# Patient Record
Sex: MALE | Race: WHITE | Employment: OTHER | URBAN - METROPOLITAN AREA
[De-identification: names, ages, dates, MRNs, and addresses within clinical notes are randomized per-mention and may not be internally consistent; named-entity substitution may affect disease eponyms.]

---

## 2018-08-02 ENCOUNTER — TELEPHONE (OUTPATIENT)
Dept: GASTROENTEROLOGY | Age: 73
End: 2018-08-02

## 2018-08-02 NOTE — TELEPHONE ENCOUNTER
Mailed patient a letter to contact the office to schedule colonoscopy. Patient was due in 2017. Patient was contacted at least 2 times prior to today.  Patient will be removed from recall list.

## 2019-04-19 LAB
A/G RATIO: 1.4 (ref 1.1–2.2)
ALBUMIN SERPL-MCNC: 4.6 G/DL (ref 3.4–5)
ALP BLD-CCNC: 98 U/L (ref 40–129)
ALT SERPL-CCNC: 23 U/L (ref 10–40)
ANION GAP SERPL CALCULATED.3IONS-SCNC: 12 MMOL/L (ref 3–16)
AST SERPL-CCNC: 19 U/L (ref 15–37)
BILIRUB SERPL-MCNC: 0.9 MG/DL (ref 0–1)
BUN BLDV-MCNC: 15 MG/DL (ref 7–20)
CALCIUM SERPL-MCNC: 9.7 MG/DL (ref 8.3–10.6)
CHLORIDE BLD-SCNC: 104 MMOL/L (ref 99–110)
CHOLESTEROL, FASTING: 144 MG/DL (ref 0–199)
CO2: 25 MMOL/L (ref 21–32)
CREAT SERPL-MCNC: 1.1 MG/DL (ref 0.8–1.3)
GFR AFRICAN AMERICAN: >60
GFR NON-AFRICAN AMERICAN: >60
GLOBULIN: 3.2 G/DL
GLUCOSE FASTING: 98 MG/DL (ref 70–99)
HDLC SERPL-MCNC: 42 MG/DL (ref 40–60)
LDL CHOLESTEROL CALCULATED: 74 MG/DL
POTASSIUM SERPL-SCNC: 4.6 MMOL/L (ref 3.5–5.1)
SODIUM BLD-SCNC: 141 MMOL/L (ref 136–145)
T4 FREE: 1.4 NG/DL (ref 0.9–1.8)
TOTAL PROTEIN: 7.8 G/DL (ref 6.4–8.2)
TRIGLYCERIDE, FASTING: 140 MG/DL (ref 0–150)
TSH SERPL DL<=0.05 MIU/L-ACNC: 5.09 UIU/ML (ref 0.27–4.2)
VLDLC SERPL CALC-MCNC: 28 MG/DL

## 2019-04-29 ENCOUNTER — HOSPITAL ENCOUNTER (OUTPATIENT)
Dept: GENERAL RADIOLOGY | Age: 74
Discharge: HOME | End: 2019-04-29
Payer: MEDICARE

## 2019-04-29 ENCOUNTER — HOSPITAL ENCOUNTER (OUTPATIENT)
Age: 74
Discharge: HOME | End: 2019-04-29
Payer: MEDICARE

## 2019-04-29 DIAGNOSIS — R05.9 COUGH: ICD-10-CM

## 2019-04-29 PROCEDURE — 71046 X-RAY EXAM CHEST 2 VIEWS: CPT

## 2019-04-30 ENCOUNTER — TELEPHONE (OUTPATIENT)
Dept: FAMILY MEDICINE CLINIC | Age: 74
End: 2019-04-30

## 2019-04-30 NOTE — TELEPHONE ENCOUNTER
LM FOR PT ON CHEST XRAY RESULTS, XRAY NORMAL PER DR BRAR.     Electronically signed by Kathya Hurt MA on 4/30/2019 at 9:35 AM

## 2019-05-02 ENCOUNTER — TELEPHONE (OUTPATIENT)
Dept: FAMILY MEDICINE CLINIC | Age: 74
End: 2019-05-02

## 2019-05-02 RX ORDER — LOSARTAN POTASSIUM 50 MG/1
50 TABLET ORAL DAILY
Qty: 30 TABLET | Refills: 2 | Status: SHIPPED | OUTPATIENT
Start: 2019-05-02 | End: 2019-06-04 | Stop reason: SINTOL

## 2019-05-02 NOTE — TELEPHONE ENCOUNTER
MICHAEL AVELAR. PT, ADVISED PER DR BRAR GO BACK ON LOSARTAN, D/C HCTZ. PT VOICED UNDERSTANDING, SENT WM/ISAIAS.     Electronically signed by Berhane Richards MA on 5/2/2019 at 3:03 PM

## 2019-05-31 ENCOUNTER — TELEPHONE (OUTPATIENT)
Dept: FAMILY MEDICINE CLINIC | Age: 74
End: 2019-05-31

## 2019-05-31 NOTE — TELEPHONE ENCOUNTER
SPOKE WITH WIFE REG PREV MESSAGE. EXPLAINED D/T NEW COMPANY AND SCHEDULING July IS THE NEXT AVAILABLE APPT. HOWEVER WE COULD PUT PT HIM ON THE CANCELLATION LIST FOR APPTS, WALK IN CLINIC AND I COULD TALK WITH DR Linda Bazzi ON Monday TO ADDRESS. ADVISED DR GIBBS IS OUT OF THE OFFICE TODAY. GÓMEZ VOICED SINCE STARING THE NEW BP MEDICATION 18-20 DAYS PT'S FELT TERRIBLE AND BP AND PULSE WAS ALL OVER THE PLACE. PT IS ALSO HAS NO ENERGY. GÓMEZ VOICED UNDERSTANDING. MESSAGE FWD TO DR Linda Bazzi TO ADDRESS.     Electronically signed by Jose Carmichael MA on 5/31/2019 at 4:14 PM

## 2019-05-31 NOTE — TELEPHONE ENCOUNTER
----- Message from Fred Ibrahim sent at 5/31/2019  3:31 PM EDT -----  Contact: phil   pts wife called wanting to get an appt for him because when he was in on April 26, 2019 Dr. Nilda Barba changed his bp medicine and she said there is something really wrong and he needs seen. The first available is August 9th. I'm not able to do anything.

## 2019-06-03 DIAGNOSIS — E05.00 GRAVES' DISEASE IN REMISSION: ICD-10-CM

## 2019-06-03 DIAGNOSIS — Z86.010 HISTORY OF COLON POLYPS: ICD-10-CM

## 2019-06-03 DIAGNOSIS — I10 ESSENTIAL HYPERTENSION: ICD-10-CM

## 2019-06-03 DIAGNOSIS — E03.8 SECONDARY HYPOTHYROIDISM: ICD-10-CM

## 2019-06-03 RX ORDER — SIMVASTATIN 20 MG
20 TABLET ORAL DAILY
COMMUNITY
End: 2019-10-28 | Stop reason: SDUPTHER

## 2019-06-03 RX ORDER — LEVOTHYROXINE SODIUM 0.15 MG/1
150 TABLET ORAL DAILY
COMMUNITY
End: 2019-10-28 | Stop reason: SDUPTHER

## 2019-06-03 RX ORDER — LEVOTHYROXINE SODIUM 137 UG/1
137 TABLET ORAL DAILY
COMMUNITY
End: 2019-10-28 | Stop reason: SDUPTHER

## 2019-06-03 RX ORDER — AMLODIPINE BESYLATE 10 MG/1
10 TABLET ORAL DAILY
COMMUNITY
End: 2019-10-28 | Stop reason: SDUPTHER

## 2019-06-03 NOTE — TELEPHONE ENCOUNTER
SPOKE WITH WIFE REG PREV MESSAGE. APPT MADE FOR TOMORROW 6/4/19 1:30PM WITH DR Sherron Esteves.     Electronically signed by Charito Gutierrez MA on 6/3/2019 at 10:46 AM

## 2019-06-04 ENCOUNTER — OFFICE VISIT (OUTPATIENT)
Dept: FAMILY MEDICINE CLINIC | Age: 74
End: 2019-06-04
Payer: MEDICARE

## 2019-06-04 VITALS
WEIGHT: 210 LBS | HEART RATE: 60 BPM | HEIGHT: 72 IN | SYSTOLIC BLOOD PRESSURE: 130 MMHG | DIASTOLIC BLOOD PRESSURE: 82 MMHG | BODY MASS INDEX: 28.44 KG/M2

## 2019-06-04 DIAGNOSIS — R53.83 FATIGUE, UNSPECIFIED TYPE: ICD-10-CM

## 2019-06-04 DIAGNOSIS — T46.4X5A COUGH DUE TO ACE INHIBITOR: ICD-10-CM

## 2019-06-04 DIAGNOSIS — R05.8 COUGH DUE TO ACE INHIBITOR: ICD-10-CM

## 2019-06-04 DIAGNOSIS — I10 ESSENTIAL HYPERTENSION: Primary | ICD-10-CM

## 2019-06-04 DIAGNOSIS — R50.2 DRUG-INDUCED FEVER: ICD-10-CM

## 2019-06-04 PROCEDURE — 4040F PNEUMOC VAC/ADMIN/RCVD: CPT | Performed by: FAMILY MEDICINE

## 2019-06-04 PROCEDURE — 1123F ACP DISCUSS/DSCN MKR DOCD: CPT | Performed by: FAMILY MEDICINE

## 2019-06-04 PROCEDURE — 99213 OFFICE O/P EST LOW 20 MIN: CPT | Performed by: FAMILY MEDICINE

## 2019-06-04 PROCEDURE — 3017F COLORECTAL CA SCREEN DOC REV: CPT | Performed by: FAMILY MEDICINE

## 2019-06-04 PROCEDURE — G8427 DOCREV CUR MEDS BY ELIG CLIN: HCPCS | Performed by: FAMILY MEDICINE

## 2019-06-04 PROCEDURE — 1036F TOBACCO NON-USER: CPT | Performed by: FAMILY MEDICINE

## 2019-06-04 PROCEDURE — G8419 CALC BMI OUT NRM PARAM NOF/U: HCPCS | Performed by: FAMILY MEDICINE

## 2019-06-04 RX ORDER — CALCIUM CARBONATE/VITAMIN D3 600 MG-10
1 TABLET ORAL DAILY
COMMUNITY
End: 2022-07-21

## 2019-06-04 RX ORDER — ATENOLOL 25 MG/1
25 TABLET ORAL DAILY
Qty: 30 TABLET | Refills: 3 | Status: SHIPPED | OUTPATIENT
Start: 2019-06-04 | End: 2019-07-12 | Stop reason: SDUPTHER

## 2019-06-04 ASSESSMENT — ENCOUNTER SYMPTOMS
ABDOMINAL PAIN: 0
WHEEZING: 0
CHEST TIGHTNESS: 0
COUGH: 1
SHORTNESS OF BREATH: 0

## 2019-06-04 ASSESSMENT — PATIENT HEALTH QUESTIONNAIRE - PHQ9
SUM OF ALL RESPONSES TO PHQ QUESTIONS 1-9: 0
SUM OF ALL RESPONSES TO PHQ9 QUESTIONS 1 & 2: 0
SUM OF ALL RESPONSES TO PHQ QUESTIONS 1-9: 0
1. LITTLE INTEREST OR PLEASURE IN DOING THINGS: 0
2. FEELING DOWN, DEPRESSED OR HOPELESS: 0

## 2019-06-04 NOTE — PROGRESS NOTES
6/4/2019     Shanna Yadav is a 69 y.o. male who presents today with   Chief Complaint   Patient presents with    Hypertension     DC'D HCTZ ON 5/15/19 DUE TO INCREASED EXHAUSTION, INCREASED  PULSE, HYPOTENTION, INCREASED PAIN, FEVER. RESTARTED LOSARTAN ON 5/16. COUGH HAS RETURNED, OTHER SYMPTIOMS BETTER.  Fatigue     EXTREME EXHAUSTION       HPI    Is here because he believes he had significant side effects 24 children from losartan to hydrochlorothiazide. The patient it's got some erratic blood pressure readings initially said he went out many Pseudomonas. 2.  Lovenox that he ran an unexplained fever for a couple days. He felt terrible. He felt fatigued and washed out. He finally stopped chlorothiazide went back to losartan, but the cough that we were trying to get rid of his come back  Is nonproductive. It is typical of ACE inhibitor cough        REVIEW OF SYMPTOMS    Review of Systems   Constitutional: Positive for fatigue and fever. Negative for activity change, appetite change and unexpected weight change. Respiratory: Positive for cough. Negative for chest tightness, shortness of breath and wheezing. Cardiovascular: Negative. Negative for chest pain and leg swelling. Gastrointestinal: Negative for abdominal pain. Musculoskeletal: Negative. Neurological: Positive for weakness. Negative for dizziness.        PAST MEDICAL HISTORY  Past Medical History:   Diagnosis Date    Essential hypertension     Graves' disease in remission     History of colon polyps     Hyperlipidemia     Secondary hypothyroidism     4/3/1995- [facility]; 4/7/1995-[facility]       FAMILY HISTORY  Family History   Problem Relation Age of Onset    Cancer Father         Bladder       SOCIAL HISTORY  Social History     Socioeconomic History    Marital status:      Spouse name: None    Number of children: None    Years of education: None    Highest education level: None   Occupational History    Occupation:      Comment: Retired   Social Needs    Financial resource strain: None    Food insecurity:     Worry: None     Inability: None    Transportation needs:     Medical: None     Non-medical: None   Tobacco Use    Smoking status: Former Smoker     Packs/day: 1.00     Years: 20.00     Pack years: 20.00     Types: Cigarettes     Last attempt to quit: 1994     Years since quittin.0    Smokeless tobacco: Never Used   Substance and Sexual Activity    Alcohol use: None    Drug use: None    Sexual activity: None   Lifestyle    Physical activity:     Days per week: None     Minutes per session: None    Stress: None   Relationships    Social connections:     Talks on phone: None     Gets together: None     Attends Baptism service: None     Active member of club or organization: None     Attends meetings of clubs or organizations: None     Relationship status: None    Intimate partner violence:     Fear of current or ex partner: None     Emotionally abused: None     Physically abused: None     Forced sexual activity: None   Other Topics Concern    None   Social History Narrative    None        SURGICAL HISTORY  No past surgical history on file. CURRENT MEDICATIONS  Current Outpatient Medications   Medication Sig Dispense Refill    Omega 3 1200 MG CAPS Take 1 capsule by mouth daily      atenolol (TENORMIN) 25 MG tablet Take 1 tablet by mouth daily 30 tablet 3    amLODIPine (NORVASC) 10 MG tablet Take 10 mg by mouth daily      simvastatin (ZOCOR) 20 MG tablet Take 20 mg by mouth daily      aspirin 81 MG tablet Take 81 mg by mouth daily      levothyroxine (SYNTHROID) 150 MCG tablet Take 150 mcg by mouth Daily (Alternate with Levothyroxine 137 mcg)      levothyroxine (SYNTHROID) 137 MCG tablet Take 137 mcg by mouth Daily (Alternate with Levothyroxine 150mcg)       No current facility-administered medications for this visit.         ALLERGIES  Allergies   Allergen Reactions    Pcn [Penicillins]        PHYSICAL EXAM    /82 (Site: Left Upper Arm)   Pulse 60   Ht 6' (1.829 m)   Wt 210 lb (95.3 kg)   BMI 28.48 kg/m²     Physical Exam   Constitutional: He is oriented to person, place, and time. He appears well-developed and well-nourished. Cardiovascular: Normal rate, regular rhythm, normal heart sounds and intact distal pulses. Pulmonary/Chest: Effort normal and breath sounds normal. No respiratory distress. He has no wheezes. He has no rales. Neurological: He is alert and oriented to person, place, and time. Psychiatric: He has a normal mood and affect. Nursing note and vitals reviewed. ASSESSMENT:    Nico was seen today for hypertension and fatigue. Diagnoses and all orders for this visit:    Essential hypertension    Cough due to ACE inhibitor    Fatigue, unspecified type    Drug-induced fever    Other orders  -     atenolol (TENORMIN) 25 MG tablet; Take 1 tablet by mouth daily     The patient appears to have significant side effects from hydrochlorothiazide and has likely a ACE inhibitor cough. He still needs antihypertensive therapy       PLAN:    Start atenolol as above. Stay off the losartan. Get a arm cuff and several wrist cuff. He is advised. His pulse may drop and to monitor that. Follow-up in 6 weeks        Return in about 6 weeks (around 7/16/2019). Electronically signed by Pita Bagley MD on 6/4/2019    Please note that this chart was generated using dragon dictation software. Although every effort was made to ensure the accuracy of this automated transcription, some errors in transcription may have occurred.

## 2019-07-12 ENCOUNTER — OFFICE VISIT (OUTPATIENT)
Dept: FAMILY MEDICINE CLINIC | Age: 74
End: 2019-07-12
Payer: MEDICARE

## 2019-07-12 VITALS
BODY MASS INDEX: 28.47 KG/M2 | WEIGHT: 210.2 LBS | HEART RATE: 64 BPM | HEIGHT: 72 IN | SYSTOLIC BLOOD PRESSURE: 120 MMHG | DIASTOLIC BLOOD PRESSURE: 84 MMHG

## 2019-07-12 DIAGNOSIS — R09.82 POST-NASAL DRIP: ICD-10-CM

## 2019-07-12 DIAGNOSIS — R05.9 COUGH: ICD-10-CM

## 2019-07-12 DIAGNOSIS — Z23 NEED FOR PROPHYLACTIC VACCINATION AGAINST DIPHTHERIA-TETANUS-PERTUSSIS (DTP): ICD-10-CM

## 2019-07-12 DIAGNOSIS — I10 ESSENTIAL HYPERTENSION: Primary | ICD-10-CM

## 2019-07-12 DIAGNOSIS — Z23 NEED FOR PROPHYLACTIC VACCINATION AND INOCULATION AGAINST VARICELLA: ICD-10-CM

## 2019-07-12 DIAGNOSIS — R53.83 FATIGUE, UNSPECIFIED TYPE: ICD-10-CM

## 2019-07-12 PROCEDURE — 99213 OFFICE O/P EST LOW 20 MIN: CPT | Performed by: FAMILY MEDICINE

## 2019-07-12 PROCEDURE — G8419 CALC BMI OUT NRM PARAM NOF/U: HCPCS | Performed by: FAMILY MEDICINE

## 2019-07-12 PROCEDURE — 3017F COLORECTAL CA SCREEN DOC REV: CPT | Performed by: FAMILY MEDICINE

## 2019-07-12 PROCEDURE — 4040F PNEUMOC VAC/ADMIN/RCVD: CPT | Performed by: FAMILY MEDICINE

## 2019-07-12 PROCEDURE — G8427 DOCREV CUR MEDS BY ELIG CLIN: HCPCS | Performed by: FAMILY MEDICINE

## 2019-07-12 PROCEDURE — 1123F ACP DISCUSS/DSCN MKR DOCD: CPT | Performed by: FAMILY MEDICINE

## 2019-07-12 PROCEDURE — 1036F TOBACCO NON-USER: CPT | Performed by: FAMILY MEDICINE

## 2019-07-12 RX ORDER — ATENOLOL 25 MG/1
25 TABLET ORAL DAILY
Qty: 90 TABLET | Refills: 0 | Status: SHIPPED | OUTPATIENT
Start: 2019-07-12 | End: 2019-10-28 | Stop reason: SDUPTHER

## 2019-07-12 RX ORDER — FLUTICASONE PROPIONATE 50 MCG
2 SPRAY, SUSPENSION (ML) NASAL DAILY
Qty: 3 BOTTLE | Refills: 1 | Status: SHIPPED | OUTPATIENT
Start: 2019-07-12 | End: 2019-10-28 | Stop reason: ALTCHOICE

## 2019-07-12 ASSESSMENT — ENCOUNTER SYMPTOMS
EYES NEGATIVE: 1
ABDOMINAL PAIN: 0
RESPIRATORY NEGATIVE: 1
SHORTNESS OF BREATH: 0
COUGH: 0
CHEST TIGHTNESS: 0
WHEEZING: 0

## 2019-07-12 NOTE — PROGRESS NOTES
7/12/2019     Temi Brothers is a 69 y.o. male who presents today with   Chief Complaint   Patient presents with    1 Month Follow-Up     6 WEEK CHECK-STARTED ATENOLOL, D/C LOSARTAN. BP AT HOME AVERAGE 124/74 PULSE 53 AVERAGE. PT IS USING BOTH ARM AND WRIST CUFF.       HPI    he is here for follow-up after changing his blood pressure medicine  He continues have a nagging cough  In fact he coughed several times during the office visit is a dry cough now he noticed that he has nasal congestion he has some postnasal drip which may be the etiology  He already took him off the ACE and arm medicines and I took him off hydrochlorothiazide he is now on Norvasc and atenolol  His heart rate runs about 55-60  Complaining about fatigue but he said this predated starting the beta-blocker  He does not have any shortness of breath    He had labs done within the past 3 months included as his thyroid labs  He does not have any snoring or disturbed sleep  He denies any symptoms of depression        REVIEW OF SYMPTOMS    Review of Systems   Constitutional: Negative. Negative for activity change, appetite change and unexpected weight change. HENT: Positive for congestion and postnasal drip. Eyes: Negative. Respiratory: Negative. Negative for cough, chest tightness, shortness of breath and wheezing. Cardiovascular: Negative. Negative for chest pain and leg swelling. Gastrointestinal: Negative for abdominal pain. Endocrine: Negative. Genitourinary: Negative. Musculoskeletal: Negative. Neurological: Positive for weakness. Negative for dizziness and light-headedness. Psychiatric/Behavioral: Negative. Negative for dysphoric mood.        PAST MEDICAL HISTORY  Past Medical History:   Diagnosis Date    Essential hypertension     Graves' disease in remission     History of colon polyps     Hyperlipidemia     Secondary hypothyroidism     4/3/1995- [facility]; 4/7/1995-[facility]       FAMILY HISTORY  Family tablet by mouth daily 90 tablet 0    fluticasone (FLONASE) 50 MCG/ACT nasal spray 2 sprays by Each Nostril route daily 3 Bottle 1    Omega 3 1200 MG CAPS Take 1 capsule by mouth daily      amLODIPine (NORVASC) 10 MG tablet Take 10 mg by mouth daily      simvastatin (ZOCOR) 20 MG tablet Take 20 mg by mouth daily      aspirin 81 MG tablet Take 81 mg by mouth daily      levothyroxine (SYNTHROID) 150 MCG tablet Take 150 mcg by mouth Daily (Alternate with Levothyroxine 137 mcg)      levothyroxine (SYNTHROID) 137 MCG tablet Take 137 mcg by mouth Daily (Alternate with Levothyroxine 150mcg)       No current facility-administered medications for this visit. ALLERGIES  Allergies   Allergen Reactions    Pcn [Penicillins]        PHYSICAL EXAM    /84 (Site: Left Upper Arm, Position: Sitting, Cuff Size: Medium Adult)   Pulse 64   Ht 6' (1.829 m)   Wt 210 lb 3.2 oz (95.3 kg)   BMI 28.51 kg/m²     Physical Exam   Constitutional: He is oriented to person, place, and time. He appears well-developed and well-nourished. Eyes: Conjunctivae are normal.   Neck: No thyromegaly present. Cardiovascular: Normal rate, regular rhythm, normal heart sounds and intact distal pulses. Pulmonary/Chest: Effort normal and breath sounds normal. No respiratory distress. He has no wheezes. He has no rales. Abdominal: Soft. He exhibits no distension. There is no tenderness. There is no guarding. Musculoskeletal: He exhibits no edema. Lymphadenopathy:     He has no cervical adenopathy. Neurological: He is alert and oriented to person, place, and time. Skin: Skin is warm and dry. Psychiatric: He has a normal mood and affect. Nursing note and vitals reviewed. ASSESSMENT:    Charli Pyle was seen today for 1 month follow-up.     Diagnoses and all orders for this visit:    Essential hypertension    Need for prophylactic vaccination against diphtheria-tetanus-pertussis (DTP)  -     Tdap (ADACEL) 5-2-15.5 LF-MCG/0.5

## 2019-10-23 DIAGNOSIS — R53.83 FATIGUE, UNSPECIFIED TYPE: ICD-10-CM

## 2019-10-23 LAB
BASOPHILS ABSOLUTE: 0.1 K/UL (ref 0–0.2)
BASOPHILS RELATIVE PERCENT: 1.3 %
EOSINOPHILS ABSOLUTE: 0.4 K/UL (ref 0–0.6)
EOSINOPHILS RELATIVE PERCENT: 4.2 %
HCT VFR BLD CALC: 48.8 % (ref 40.5–52.5)
HEMOGLOBIN: 16.7 G/DL (ref 13.5–17.5)
LYMPHOCYTES ABSOLUTE: 2.2 K/UL (ref 1–5.1)
LYMPHOCYTES RELATIVE PERCENT: 25.1 %
MCH RBC QN AUTO: 31.3 PG (ref 26–34)
MCHC RBC AUTO-ENTMCNC: 34.1 G/DL (ref 31–36)
MCV RBC AUTO: 91.8 FL (ref 80–100)
MONOCYTES ABSOLUTE: 0.6 K/UL (ref 0–1.3)
MONOCYTES RELATIVE PERCENT: 6.5 %
NEUTROPHILS ABSOLUTE: 5.5 K/UL (ref 1.7–7.7)
NEUTROPHILS RELATIVE PERCENT: 62.9 %
PDW BLD-RTO: 13.7 % (ref 12.4–15.4)
PLATELET # BLD: 219 K/UL (ref 135–450)
PMV BLD AUTO: 7.9 FL (ref 5–10.5)
RBC # BLD: 5.31 M/UL (ref 4.2–5.9)
VITAMIN B-12: 667 PG/ML (ref 211–911)
WBC # BLD: 8.6 K/UL (ref 4–11)

## 2019-10-28 ENCOUNTER — OFFICE VISIT (OUTPATIENT)
Dept: FAMILY MEDICINE CLINIC | Age: 74
End: 2019-10-28
Payer: MEDICARE

## 2019-10-28 VITALS
DIASTOLIC BLOOD PRESSURE: 78 MMHG | WEIGHT: 207.2 LBS | BODY MASS INDEX: 28.06 KG/M2 | HEART RATE: 53 BPM | OXYGEN SATURATION: 92 % | HEIGHT: 72 IN | SYSTOLIC BLOOD PRESSURE: 132 MMHG

## 2019-10-28 DIAGNOSIS — E78.2 MIXED HYPERLIPIDEMIA: Primary | ICD-10-CM

## 2019-10-28 DIAGNOSIS — E03.8 SECONDARY HYPOTHYROIDISM: ICD-10-CM

## 2019-10-28 DIAGNOSIS — R05.9 COUGH: ICD-10-CM

## 2019-10-28 DIAGNOSIS — R53.82 CHRONIC FATIGUE: ICD-10-CM

## 2019-10-28 PROCEDURE — 1123F ACP DISCUSS/DSCN MKR DOCD: CPT | Performed by: FAMILY MEDICINE

## 2019-10-28 PROCEDURE — G8484 FLU IMMUNIZE NO ADMIN: HCPCS | Performed by: FAMILY MEDICINE

## 2019-10-28 PROCEDURE — 99214 OFFICE O/P EST MOD 30 MIN: CPT | Performed by: FAMILY MEDICINE

## 2019-10-28 PROCEDURE — G8417 CALC BMI ABV UP PARAM F/U: HCPCS | Performed by: FAMILY MEDICINE

## 2019-10-28 PROCEDURE — 1036F TOBACCO NON-USER: CPT | Performed by: FAMILY MEDICINE

## 2019-10-28 PROCEDURE — 3017F COLORECTAL CA SCREEN DOC REV: CPT | Performed by: FAMILY MEDICINE

## 2019-10-28 PROCEDURE — 4040F PNEUMOC VAC/ADMIN/RCVD: CPT | Performed by: FAMILY MEDICINE

## 2019-10-28 PROCEDURE — G8427 DOCREV CUR MEDS BY ELIG CLIN: HCPCS | Performed by: FAMILY MEDICINE

## 2019-10-28 RX ORDER — LEVOTHYROXINE SODIUM 137 UG/1
137 TABLET ORAL DAILY
Qty: 90 TABLET | Refills: 1 | Status: SHIPPED | OUTPATIENT
Start: 2019-10-28 | End: 2020-05-01 | Stop reason: ALTCHOICE

## 2019-10-28 RX ORDER — SIMVASTATIN 20 MG
20 TABLET ORAL DAILY
Qty: 90 TABLET | Refills: 1 | Status: SHIPPED | OUTPATIENT
Start: 2019-10-28 | End: 2020-05-01 | Stop reason: SDUPTHER

## 2019-10-28 RX ORDER — LEVOTHYROXINE SODIUM 0.15 MG/1
150 TABLET ORAL DAILY
Qty: 45 TABLET | Refills: 1 | Status: SHIPPED | OUTPATIENT
Start: 2019-10-28 | End: 2020-05-01 | Stop reason: SDUPTHER

## 2019-10-28 RX ORDER — ATENOLOL 25 MG/1
25 TABLET ORAL DAILY
Qty: 90 TABLET | Refills: 0 | Status: SHIPPED | OUTPATIENT
Start: 2019-10-28 | End: 2020-04-01 | Stop reason: SDUPTHER

## 2019-10-28 RX ORDER — AMLODIPINE BESYLATE 10 MG/1
10 TABLET ORAL DAILY
Qty: 90 TABLET | Refills: 1 | Status: SHIPPED | OUTPATIENT
Start: 2019-10-28 | End: 2020-05-01 | Stop reason: SDUPTHER

## 2019-10-28 ASSESSMENT — ENCOUNTER SYMPTOMS
WHEEZING: 0
CHEST TIGHTNESS: 0
SHORTNESS OF BREATH: 0
ABDOMINAL PAIN: 0
COUGH: 1

## 2019-11-06 ENCOUNTER — TELEPHONE (OUTPATIENT)
Dept: PULMONOLOGY | Age: 74
End: 2019-11-06

## 2019-11-06 ENCOUNTER — INITIAL CONSULT (OUTPATIENT)
Dept: PULMONOLOGY | Age: 74
End: 2019-11-06
Payer: MEDICARE

## 2019-11-06 VITALS
HEIGHT: 72 IN | SYSTOLIC BLOOD PRESSURE: 110 MMHG | OXYGEN SATURATION: 92 % | HEART RATE: 56 BPM | BODY MASS INDEX: 27.96 KG/M2 | WEIGHT: 206.4 LBS | DIASTOLIC BLOOD PRESSURE: 74 MMHG

## 2019-11-06 DIAGNOSIS — Z87.891 FORMER SMOKER: ICD-10-CM

## 2019-11-06 DIAGNOSIS — K21.9 GASTROESOPHAGEAL REFLUX DISEASE WITHOUT ESOPHAGITIS: ICD-10-CM

## 2019-11-06 DIAGNOSIS — R05.3 CHRONIC COUGH: Primary | ICD-10-CM

## 2019-11-06 DIAGNOSIS — R09.82 POSTNASAL DRIP: ICD-10-CM

## 2019-11-06 DIAGNOSIS — R06.09 DYSPNEA ON EXERTION: ICD-10-CM

## 2019-11-06 LAB
EXPIRATORY TIME-POST: NORMAL SEC
EXPIRATORY TIME: NORMAL SEC
FEF 25-75% %CHNG: NORMAL
FEF 25-75% %PRED-POST: NORMAL %
FEF 25-75% %PRED-PRE: NORMAL L/SEC
FEF 25-75% PRED: NORMAL L/SEC
FEF 25-75%-POST: NORMAL L/SEC
FEF 25-75%-PRE: NORMAL L/SEC
FEV1 %PRED-POST: 78.1 %
FEV1 %PRED-PRE: 76.4 %
FEV1 PRED: 2.63 L
FEV1-POST: 2.68 L
FEV1-PRE: 3.44 L
FEV1/FVC %PRED-POST: 104 %
FEV1/FVC %PRED-PRE: 99.3 %
FEV1/FVC PRED: 72.6 %
FEV1/FVC-POST: 76 %
FEV1/FVC-PRE: 73.1 %
FVC %PRED-POST: 75.3 L
FVC %PRED-PRE: 77.1 %
FVC PRED: 3.62 L
FVC-POST: 3.53 L
FVC-PRE: 4.69 L
PEF %PRED-POST: NORMAL %
PEF %PRED-PRE: NORMAL L/SEC
PEF PRED: NORMAL L/SEC
PEF%CHNG: NORMAL
PEF-POST: NORMAL L/SEC
PEF-PRE: NORMAL L/SEC

## 2019-11-06 PROCEDURE — 1123F ACP DISCUSS/DSCN MKR DOCD: CPT | Performed by: INTERNAL MEDICINE

## 2019-11-06 PROCEDURE — 4040F PNEUMOC VAC/ADMIN/RCVD: CPT | Performed by: INTERNAL MEDICINE

## 2019-11-06 PROCEDURE — 1036F TOBACCO NON-USER: CPT | Performed by: INTERNAL MEDICINE

## 2019-11-06 PROCEDURE — G8417 CALC BMI ABV UP PARAM F/U: HCPCS | Performed by: INTERNAL MEDICINE

## 2019-11-06 PROCEDURE — 99204 OFFICE O/P NEW MOD 45 MIN: CPT | Performed by: INTERNAL MEDICINE

## 2019-11-06 PROCEDURE — G8484 FLU IMMUNIZE NO ADMIN: HCPCS | Performed by: INTERNAL MEDICINE

## 2019-11-06 PROCEDURE — 3017F COLORECTAL CA SCREEN DOC REV: CPT | Performed by: INTERNAL MEDICINE

## 2019-11-06 PROCEDURE — G8427 DOCREV CUR MEDS BY ELIG CLIN: HCPCS | Performed by: INTERNAL MEDICINE

## 2019-11-06 RX ORDER — FLUTICASONE FUROATE AND VILANTEROL 100; 25 UG/1; UG/1
1 POWDER RESPIRATORY (INHALATION) DAILY
Qty: 2 EACH | Refills: 0 | COMMUNITY
Start: 2019-11-06 | End: 2020-05-01 | Stop reason: SDUPTHER

## 2019-11-06 RX ORDER — OMEPRAZOLE 20 MG/1
20 CAPSULE, DELAYED RELEASE ORAL
Qty: 60 CAPSULE | Refills: 3 | Status: SHIPPED | OUTPATIENT
Start: 2019-11-06 | End: 2020-05-01

## 2019-11-06 ASSESSMENT — PULMONARY FUNCTION TESTS
FEV1/FVC_PRE: 73.1
FEV1_PERCENT_PREDICTED_POST: 78.1
FVC_PREDICTED: 3.62
FVC_PERCENT_PREDICTED_PRE: 77.1
FEV1/FVC_POST: 76.0
FVC_POST: 3.53
FEV1_PERCENT_PREDICTED_PRE: 76.4
FEV1_PREDICTED: 2.63
FVC_PERCENT_PREDICTED_POST: 75.3
FEV1_POST: 2.68
FEV1_PRE: 3.44
FEV1/FVC_PERCENT_PREDICTED_POST: 104.0
FEV1/FVC_PERCENT_PREDICTED_PRE: 99.3
FEV1/FVC_PREDICTED: 72.6
FVC_PRE: 4.69

## 2019-11-07 RX ORDER — ALBUTEROL SULFATE 90 UG/1
2 AEROSOL, METERED RESPIRATORY (INHALATION) EVERY 6 HOURS PRN
Qty: 1 INHALER | Refills: 11 | Status: SHIPPED | OUTPATIENT
Start: 2019-11-07 | End: 2021-06-17

## 2019-11-08 DIAGNOSIS — R06.09 DYSPNEA ON EXERTION: ICD-10-CM

## 2019-11-08 DIAGNOSIS — R05.3 CHRONIC COUGH: ICD-10-CM

## 2019-11-08 DIAGNOSIS — Z87.891 FORMER SMOKER: ICD-10-CM

## 2019-11-08 PROCEDURE — 94060 EVALUATION OF WHEEZING: CPT | Performed by: INTERNAL MEDICINE

## 2019-12-09 ENCOUNTER — OFFICE VISIT (OUTPATIENT)
Dept: PULMONOLOGY | Age: 74
End: 2019-12-09
Payer: MEDICARE

## 2019-12-09 VITALS
HEIGHT: 72 IN | RESPIRATION RATE: 16 BRPM | SYSTOLIC BLOOD PRESSURE: 124 MMHG | BODY MASS INDEX: 27.99 KG/M2 | HEART RATE: 58 BPM | DIASTOLIC BLOOD PRESSURE: 62 MMHG

## 2019-12-09 DIAGNOSIS — R05.3 CHRONIC COUGH: Primary | ICD-10-CM

## 2019-12-09 DIAGNOSIS — K21.9 GASTROESOPHAGEAL REFLUX DISEASE WITHOUT ESOPHAGITIS: ICD-10-CM

## 2019-12-09 DIAGNOSIS — Z87.891 FORMER SMOKER: ICD-10-CM

## 2019-12-09 DIAGNOSIS — R06.09 DYSPNEA ON EXERTION: ICD-10-CM

## 2019-12-09 DIAGNOSIS — J44.9 COPD, MILD (HCC): ICD-10-CM

## 2019-12-09 PROCEDURE — G8427 DOCREV CUR MEDS BY ELIG CLIN: HCPCS | Performed by: INTERNAL MEDICINE

## 2019-12-09 PROCEDURE — 99213 OFFICE O/P EST LOW 20 MIN: CPT | Performed by: INTERNAL MEDICINE

## 2019-12-09 PROCEDURE — 3017F COLORECTAL CA SCREEN DOC REV: CPT | Performed by: INTERNAL MEDICINE

## 2019-12-09 PROCEDURE — G8417 CALC BMI ABV UP PARAM F/U: HCPCS | Performed by: INTERNAL MEDICINE

## 2019-12-09 PROCEDURE — 1123F ACP DISCUSS/DSCN MKR DOCD: CPT | Performed by: INTERNAL MEDICINE

## 2019-12-09 PROCEDURE — G8484 FLU IMMUNIZE NO ADMIN: HCPCS | Performed by: INTERNAL MEDICINE

## 2019-12-09 PROCEDURE — 4040F PNEUMOC VAC/ADMIN/RCVD: CPT | Performed by: INTERNAL MEDICINE

## 2019-12-09 PROCEDURE — 3023F SPIROM DOC REV: CPT | Performed by: INTERNAL MEDICINE

## 2019-12-09 PROCEDURE — 1036F TOBACCO NON-USER: CPT | Performed by: INTERNAL MEDICINE

## 2019-12-09 PROCEDURE — G8926 SPIRO NO PERF OR DOC: HCPCS | Performed by: INTERNAL MEDICINE

## 2019-12-09 RX ORDER — FLUTICASONE FUROATE AND VILANTEROL 100; 25 UG/1; UG/1
1 POWDER RESPIRATORY (INHALATION) DAILY
Qty: 1 EACH | Refills: 11 | Status: SHIPPED
Start: 2019-12-09 | End: 2020-05-01 | Stop reason: SDUPTHER

## 2020-04-02 RX ORDER — ATENOLOL 25 MG/1
25 TABLET ORAL DAILY
Qty: 90 TABLET | Refills: 0 | Status: SHIPPED
Start: 2020-04-02 | End: 2020-05-01 | Stop reason: SINTOL

## 2020-04-24 DIAGNOSIS — E03.8 SECONDARY HYPOTHYROIDISM: ICD-10-CM

## 2020-04-24 DIAGNOSIS — E78.2 MIXED HYPERLIPIDEMIA: ICD-10-CM

## 2020-04-24 LAB
A/G RATIO: 1.4 (ref 1.1–2.2)
ALBUMIN SERPL-MCNC: 4.6 G/DL (ref 3.4–5)
ALP BLD-CCNC: 81 U/L (ref 40–129)
ALT SERPL-CCNC: 27 U/L (ref 10–40)
ANION GAP SERPL CALCULATED.3IONS-SCNC: 13 MMOL/L (ref 3–16)
AST SERPL-CCNC: 20 U/L (ref 15–37)
BILIRUB SERPL-MCNC: 0.6 MG/DL (ref 0–1)
BUN BLDV-MCNC: 16 MG/DL (ref 7–20)
CALCIUM SERPL-MCNC: 9.9 MG/DL (ref 8.3–10.6)
CHLORIDE BLD-SCNC: 105 MMOL/L (ref 99–110)
CHOLESTEROL, TOTAL: 141 MG/DL (ref 0–199)
CO2: 27 MMOL/L (ref 21–32)
CREAT SERPL-MCNC: 1.1 MG/DL (ref 0.8–1.3)
GFR AFRICAN AMERICAN: >60
GFR NON-AFRICAN AMERICAN: >60
GLOBULIN: 3.3 G/DL
GLUCOSE BLD-MCNC: 94 MG/DL (ref 70–99)
HDLC SERPL-MCNC: 41 MG/DL (ref 40–60)
LDL CHOLESTEROL CALCULATED: 65 MG/DL
POTASSIUM SERPL-SCNC: 4.9 MMOL/L (ref 3.5–5.1)
SODIUM BLD-SCNC: 145 MMOL/L (ref 136–145)
T4 FREE: 1.3 NG/DL (ref 0.9–1.8)
TOTAL PROTEIN: 7.9 G/DL (ref 6.4–8.2)
TRIGL SERPL-MCNC: 174 MG/DL (ref 0–150)
TSH SERPL DL<=0.05 MIU/L-ACNC: 8.39 UIU/ML (ref 0.27–4.2)
VLDLC SERPL CALC-MCNC: 35 MG/DL

## 2020-05-01 ENCOUNTER — OFFICE VISIT (OUTPATIENT)
Dept: FAMILY MEDICINE CLINIC | Age: 75
End: 2020-05-01
Payer: MEDICARE

## 2020-05-01 VITALS
TEMPERATURE: 97.5 F | OXYGEN SATURATION: 93 % | SYSTOLIC BLOOD PRESSURE: 120 MMHG | DIASTOLIC BLOOD PRESSURE: 80 MMHG | HEART RATE: 50 BPM | BODY MASS INDEX: 27.98 KG/M2 | HEIGHT: 72 IN | WEIGHT: 206.6 LBS

## 2020-05-01 PROCEDURE — 3017F COLORECTAL CA SCREEN DOC REV: CPT | Performed by: FAMILY MEDICINE

## 2020-05-01 PROCEDURE — 1123F ACP DISCUSS/DSCN MKR DOCD: CPT | Performed by: FAMILY MEDICINE

## 2020-05-01 PROCEDURE — 3023F SPIROM DOC REV: CPT | Performed by: FAMILY MEDICINE

## 2020-05-01 PROCEDURE — G8417 CALC BMI ABV UP PARAM F/U: HCPCS | Performed by: FAMILY MEDICINE

## 2020-05-01 PROCEDURE — 1036F TOBACCO NON-USER: CPT | Performed by: FAMILY MEDICINE

## 2020-05-01 PROCEDURE — G8427 DOCREV CUR MEDS BY ELIG CLIN: HCPCS | Performed by: FAMILY MEDICINE

## 2020-05-01 PROCEDURE — G8926 SPIRO NO PERF OR DOC: HCPCS | Performed by: FAMILY MEDICINE

## 2020-05-01 PROCEDURE — 99214 OFFICE O/P EST MOD 30 MIN: CPT | Performed by: FAMILY MEDICINE

## 2020-05-01 PROCEDURE — 4040F PNEUMOC VAC/ADMIN/RCVD: CPT | Performed by: FAMILY MEDICINE

## 2020-05-01 RX ORDER — AMLODIPINE BESYLATE 10 MG/1
10 TABLET ORAL DAILY
Qty: 90 TABLET | Refills: 1 | Status: SHIPPED | OUTPATIENT
Start: 2020-05-01 | End: 2020-11-02 | Stop reason: SDUPTHER

## 2020-05-01 RX ORDER — SIMVASTATIN 20 MG
20 TABLET ORAL DAILY
Qty: 90 TABLET | Refills: 1 | Status: SHIPPED | OUTPATIENT
Start: 2020-05-01 | End: 2020-11-02 | Stop reason: SDUPTHER

## 2020-05-01 RX ORDER — LOSARTAN POTASSIUM 25 MG/1
25 TABLET ORAL DAILY
Qty: 90 TABLET | Refills: 1 | Status: SHIPPED | OUTPATIENT
Start: 2020-05-01 | End: 2020-11-02 | Stop reason: SDUPTHER

## 2020-05-01 RX ORDER — FLUTICASONE FUROATE AND VILANTEROL TRIFENATATE 100; 25 UG/1; UG/1
1 POWDER RESPIRATORY (INHALATION) DAILY
Qty: 3 EACH | Refills: 1 | Status: SHIPPED | OUTPATIENT
Start: 2020-05-01 | End: 2021-11-01 | Stop reason: ALTCHOICE

## 2020-05-01 RX ORDER — LEVOTHYROXINE SODIUM 0.15 MG/1
150 TABLET ORAL DAILY
Qty: 45 TABLET | Refills: 1 | Status: SHIPPED | OUTPATIENT
Start: 2020-05-01 | End: 2020-11-02 | Stop reason: SDUPTHER

## 2020-05-01 RX ORDER — LEVOTHYROXINE SODIUM 137 UG/1
137 TABLET ORAL DAILY
Qty: 45 TABLET | Refills: 1 | Status: CANCELLED | OUTPATIENT
Start: 2020-05-01

## 2020-05-01 ASSESSMENT — PATIENT HEALTH QUESTIONNAIRE - PHQ9
2. FEELING DOWN, DEPRESSED OR HOPELESS: 0
SUM OF ALL RESPONSES TO PHQ QUESTIONS 1-9: 0
1. LITTLE INTEREST OR PLEASURE IN DOING THINGS: 0
SUM OF ALL RESPONSES TO PHQ QUESTIONS 1-9: 0
SUM OF ALL RESPONSES TO PHQ9 QUESTIONS 1 & 2: 0

## 2020-05-01 ASSESSMENT — ENCOUNTER SYMPTOMS
COUGH: 1
SHORTNESS OF BREATH: 0
WHEEZING: 0

## 2020-05-01 NOTE — PROGRESS NOTES
2020     Radha Muller      Chief Complaint   Patient presents with    6 Month Follow-Up    Hypertension     WOULD LIKE TO DISCUSS HIS BP MEDS       HPI      Deepak Calderon is a 70 y.o. male who presents today with the followin. Need for prophylactic vaccination and inoculation against varicella    2. Essential hypertension    3. Mixed hyperlipidemia    4. Drug-induced bradycardia    5. COPD, mild ([address]    6. Secondary hypothyroidism    7. Chronic cough    He is here for several things  His biggest concern is weakness and bradycardia  He monitors his pulse at home and sometimes is down in the mid 40s  He is never had any syncopal symptoms  Has had hypertension been on multiple medical regimens currently is on low-dose atenolol 25 mg  He had been on losartan in the past we stopped it because we thought it was causing a cough however he has a chronic cough probably from COPD which did not seem to be affected by stopping the losartan  He did not have any other issues taking that medicine  He had been on hydrochlorothiazide in the past but does not think he tolerated it well    REVIEW OF SYMPTOMS    Review of Systems   Constitutional: Negative for activity change and unexpected weight change. HENT: Negative. Respiratory: Positive for cough. Negative for shortness of breath and wheezing. Chronic nonproductive cough  Likely diagnosis of COPD  Smoked heavily in the past but quit many years ago   Endocrine:        Patient had hyperthyroidism probably 20 years ago was treated with radioactive iodine and now is post procedure hypothyroid  Is on thyroid replacement   Musculoskeletal: Negative. Neurological: Positive for weakness. Negative for dizziness, syncope and light-headedness. Psychiatric/Behavioral: Negative.         PAST MEDICAL HISTORY  Past Medical History:   Diagnosis Date    Chronic cough 2019    COPD, mild ([address] 2019    Dyspnea on exertion 2019    Essential hypertension     Take 1 tablet by mouth daily 90 tablet 1    levothyroxine (SYNTHROID) 150 MCG tablet Take 1 tablet by mouth Daily (Alternate with Levothyroxine 137 mcg) 45 tablet 1    fluticasone-vilanterol (BREO ELLIPTA) 100-25 MCG/INH AEPB inhaler Inhale 1 puff into the lungs daily 3 each 1    amLODIPine (NORVASC) 10 MG tablet Take 1 tablet by mouth daily 90 tablet 1    losartan (COZAAR) 25 MG tablet Take 1 tablet by mouth daily 90 tablet 1    albuterol sulfate HFA (VENTOLIN HFA) 108 (90 Base) MCG/ACT inhaler Inhale 2 puffs into the lungs every 6 hours as needed for Wheezing 1 Inhaler 11    Omega 3 1200 MG CAPS Take 1 capsule by mouth daily       No current facility-administered medications for this visit. ALLERGIES  Allergies   Allergen Reactions    Pcn [Penicillins]        PHYSICAL EXAM    /80 (Site: Right Upper Arm, Position: Sitting, Cuff Size: Medium Adult)   Pulse 50   Temp 97.5 °F (36.4 °C)   Ht 6' (1.829 m)   Wt 206 lb 9.6 oz (93.7 kg)   SpO2 93%   BMI 28.02 kg/m²     Physical Exam  Vitals signs and nursing note reviewed. Constitutional:       General: He is not in acute distress. Appearance: Normal appearance. HENT:      Right Ear: External ear normal.      Left Ear: External ear normal.   Eyes:      Conjunctiva/sclera: Conjunctivae normal.   Pulmonary:      Effort: Pulmonary effort is normal. No respiratory distress. Neurological:      General: No focal deficit present. Mental Status: Mental status is at baseline. Psychiatric:         Mood and Affect: Mood normal.         Behavior: Behavior normal.       Reviewed recent labs  TSH was a T4 was normal   Lipid and chemistry panel are normal     ASSESSMENT and PLAN    Aldair Ventura was seen today for 6 month follow-up and hypertension.     Diagnoses and all orders for this visit:    Need for prophylactic vaccination and inoculation against varicella  He is going to recheck with the pharmacy regarding the cost on the vaccine  Essential

## 2020-06-08 ENCOUNTER — OFFICE VISIT (OUTPATIENT)
Dept: PULMONOLOGY | Age: 75
End: 2020-06-08
Payer: MEDICARE

## 2020-06-08 VITALS
HEIGHT: 72 IN | BODY MASS INDEX: 27.09 KG/M2 | SYSTOLIC BLOOD PRESSURE: 104 MMHG | OXYGEN SATURATION: 97 % | DIASTOLIC BLOOD PRESSURE: 64 MMHG | HEART RATE: 73 BPM | WEIGHT: 200 LBS

## 2020-06-08 DIAGNOSIS — E78.2 MIXED HYPERLIPIDEMIA: ICD-10-CM

## 2020-06-08 PROBLEM — J45.30 MILD PERSISTENT ASTHMA WITHOUT COMPLICATION: Status: ACTIVE | Noted: 2020-06-08

## 2020-06-08 LAB
T4 FREE: 1.6 NG/DL (ref 0.9–1.8)
TSH SERPL DL<=0.05 MIU/L-ACNC: 1.7 UIU/ML (ref 0.27–4.2)

## 2020-06-08 PROCEDURE — G8926 SPIRO NO PERF OR DOC: HCPCS | Performed by: INTERNAL MEDICINE

## 2020-06-08 PROCEDURE — G8417 CALC BMI ABV UP PARAM F/U: HCPCS | Performed by: INTERNAL MEDICINE

## 2020-06-08 PROCEDURE — 4040F PNEUMOC VAC/ADMIN/RCVD: CPT | Performed by: INTERNAL MEDICINE

## 2020-06-08 PROCEDURE — 1123F ACP DISCUSS/DSCN MKR DOCD: CPT | Performed by: INTERNAL MEDICINE

## 2020-06-08 PROCEDURE — 99213 OFFICE O/P EST LOW 20 MIN: CPT | Performed by: INTERNAL MEDICINE

## 2020-06-08 PROCEDURE — 3017F COLORECTAL CA SCREEN DOC REV: CPT | Performed by: INTERNAL MEDICINE

## 2020-06-08 PROCEDURE — G8427 DOCREV CUR MEDS BY ELIG CLIN: HCPCS | Performed by: INTERNAL MEDICINE

## 2020-06-08 PROCEDURE — 1036F TOBACCO NON-USER: CPT | Performed by: INTERNAL MEDICINE

## 2020-06-08 PROCEDURE — 3023F SPIROM DOC REV: CPT | Performed by: INTERNAL MEDICINE

## 2020-06-08 NOTE — PROGRESS NOTES
SUBJECTIVE:  Chief Complaint: Mild persistent asthma, mild COPD, chronic cough, former smoker, GERD  Mr. Ashley Alvares continues on [facility] daily and rarely has to use his rescue albuterol inhaler. He denies any recent asthmatic or bronchitic attacks. He states his cough is been under good control. He still has breakthrough symptoms with GERD. He has been on omeprazole in the past but currently is not using it. He has been socially distancing himself and wearing a mask when out in public. He has had no known COVID-19 exposure. He states that he has had no worsening cough associated with fever. ROS:  Constitution:  HEENT: Negative for ear, throat pain  Cardiovascular: Negative for chest pain, syncope, edema  Pulmonary: See HPI  Musculoskeletal: Negative for DVT, myalgias, arthralgias    OBJECTIVE:  /64   Pulse 73   Ht 6' (1.829 m)   Wt 200 lb (90.7 kg)   SpO2 97%   BMI 27.12 kg/m²      Physical Exam:  Constitutional:  He appears well developed and well-nourished. Neck:  Supple, No palpable lymphadenopathy, No JVD  Cardiovascular:  S1, S2 Normal, Regular rhythm, no murmurs or gallops, No pericardial  rubs. Pulmonary: Breath sounds are clear throughout all areas without wheezing or rhonchi although slightly coarse. No wheezing was noted on forced expiration  Abdomen: Not examined  Extremities: no edema, No DVT  Neurologic: Oriented x3, No focal deficits    Radiology: Chest x-ray on 4/29/2019 was normal  PFT: Office spirometry on 11/6/2019 demonstrated a mild obstructive defect with a significant response to bronchodilators in his small airways         ASSESSMENT:    1. COPD, mild ([address]    2. Mild persistent asthma without complication    3. Gastroesophageal reflux disease without esophagitis    4. Former smoker          PLAN:   I think it is very important for Mr. Gus Arriola to be on antireflux therapy and he will check with Dr. Shelba Hammans later this week.   I will make no change in his current

## 2020-06-10 ENCOUNTER — OFFICE VISIT (OUTPATIENT)
Dept: FAMILY MEDICINE CLINIC | Age: 75
End: 2020-06-10
Payer: MEDICARE

## 2020-06-10 VITALS
HEIGHT: 72 IN | DIASTOLIC BLOOD PRESSURE: 82 MMHG | BODY MASS INDEX: 28.04 KG/M2 | SYSTOLIC BLOOD PRESSURE: 118 MMHG | WEIGHT: 207.01 LBS

## 2020-06-10 VITALS
HEART RATE: 72 BPM | OXYGEN SATURATION: 94 % | HEIGHT: 72 IN | DIASTOLIC BLOOD PRESSURE: 82 MMHG | TEMPERATURE: 97.5 F | SYSTOLIC BLOOD PRESSURE: 118 MMHG | BODY MASS INDEX: 28.04 KG/M2 | WEIGHT: 207 LBS

## 2020-06-10 PROBLEM — R49.0 HOARSENESS: Chronic | Status: ACTIVE | Noted: 2020-06-10

## 2020-06-10 PROCEDURE — 1036F TOBACCO NON-USER: CPT | Performed by: FAMILY MEDICINE

## 2020-06-10 PROCEDURE — G8417 CALC BMI ABV UP PARAM F/U: HCPCS | Performed by: FAMILY MEDICINE

## 2020-06-10 PROCEDURE — G0438 PPPS, INITIAL VISIT: HCPCS | Performed by: FAMILY MEDICINE

## 2020-06-10 PROCEDURE — G8427 DOCREV CUR MEDS BY ELIG CLIN: HCPCS | Performed by: FAMILY MEDICINE

## 2020-06-10 PROCEDURE — 99213 OFFICE O/P EST LOW 20 MIN: CPT | Performed by: FAMILY MEDICINE

## 2020-06-10 PROCEDURE — 4040F PNEUMOC VAC/ADMIN/RCVD: CPT | Performed by: FAMILY MEDICINE

## 2020-06-10 PROCEDURE — 1123F ACP DISCUSS/DSCN MKR DOCD: CPT | Performed by: FAMILY MEDICINE

## 2020-06-10 PROCEDURE — 3017F COLORECTAL CA SCREEN DOC REV: CPT | Performed by: FAMILY MEDICINE

## 2020-06-10 ASSESSMENT — ENCOUNTER SYMPTOMS
VOICE CHANGE: 1
COUGH: 1
RHINORRHEA: 0

## 2020-06-10 ASSESSMENT — LIFESTYLE VARIABLES
HAVE YOU OR SOMEONE ELSE BEEN INJURED AS A RESULT OF YOUR DRINKING: 0
HOW OFTEN DURING THE LAST YEAR HAVE YOU BEEN UNABLE TO REMEMBER WHAT HAPPENED THE NIGHT BEFORE BECAUSE YOU HAD BEEN DRINKING: 0
HOW OFTEN DO YOU HAVE SIX OR MORE DRINKS ON ONE OCCASION: 0
HOW OFTEN DURING THE LAST YEAR HAVE YOU FAILED TO DO WHAT WAS NORMALLY EXPECTED FROM YOU BECAUSE OF DRINKING: 0
HAS A RELATIVE, FRIEND, DOCTOR, OR ANOTHER HEALTH PROFESSIONAL EXPRESSED CONCERN ABOUT YOUR DRINKING OR SUGGESTED YOU CUT DOWN: 0
HOW OFTEN DURING THE LAST YEAR HAVE YOU NEEDED AN ALCOHOLIC DRINK FIRST THING IN THE MORNING TO GET YOURSELF GOING AFTER A NIGHT OF HEAVY DRINKING: 0
HOW OFTEN DO YOU HAVE A DRINK CONTAINING ALCOHOL: 2
AUDIT TOTAL SCORE: 2
HOW MANY STANDARD DRINKS CONTAINING ALCOHOL DO YOU HAVE ON A TYPICAL DAY: 0
HOW OFTEN DURING THE LAST YEAR HAVE YOU HAD A FEELING OF GUILT OR REMORSE AFTER DRINKING: 0
HOW OFTEN DURING THE LAST YEAR HAVE YOU FOUND THAT YOU WERE NOT ABLE TO STOP DRINKING ONCE YOU HAD STARTED: 0
AUDIT-C TOTAL SCORE: 2

## 2020-06-10 ASSESSMENT — PATIENT HEALTH QUESTIONNAIRE - PHQ9
SUM OF ALL RESPONSES TO PHQ QUESTIONS 1-9: 0
SUM OF ALL RESPONSES TO PHQ QUESTIONS 1-9: 0

## 2020-06-10 NOTE — PROGRESS NOTES
history on file. Family History   Problem Relation Age of Onset    Cancer Father         Bladder       CareTeam (Including outside providers/suppliers regularly involved in providing care):   Patient Care Team:  Ivan Cash MD as PCP - Chidi Rodas MD as PCP - Janine Godinez Provider    Wt Readings from Last 3 Encounters:   06/10/20 207 lb 0.2 oz (93.9 kg)   06/10/20 207 lb (93.9 kg)   06/08/20 200 lb (90.7 kg)     Vitals:    06/10/20 1144   BP: 118/82   Weight: 207 lb 0.2 oz (93.9 kg)   Height: 6' 0.01\" (1.829 m)     Body mass index is 28.07 kg/m². Based upon direct observation of the patient, evaluation of cognition reveals recent and remote memory intact. Patient's complete Health Risk Assessment and screening values have been reviewed and are found in Flowsheets. The following problems were reviewed today and where indicated follow up appointments were made and/or referrals ordered.     Positive Risk Factor Screenings with Interventions:     Safety:  Safety  Do you have working smoke detectors?: Yes  Have all throw rugs been removed or fastened?: (!) No  Do you have non-slip mats or surfaces in all bathtubs/showers?: Yes  Do all of your stairways have a railing or banister?: Yes  Are your doorways, halls and stairs free of clutter?: Yes  Do you always fasten your seatbelt when you are in a car?: Yes  Safety Interventions:  · Home safety tips provided    Personalized Preventive Plan   Current Health Maintenance Status  Immunization History   Administered Date(s) Administered    Influenza, High Dose (Fluzone 65 yrs and older) 09/12/2018, 09/27/2019    Pneumococcal Conjugate 13-valent ([id number]) 04/27/2015    Pneumococcal Polysaccharide ([id number]) 10/26/2012    Tdap (Boostrix, Adacel) 04/27/2009, 09/27/2019        Health Maintenance   Topic Date Due    AAA screen  1945    Hepatitis C screen  1945    Shingles Vaccine (1 of 2) 04/28/1997    Annual Wellness Visit

## 2020-06-10 NOTE — PROGRESS NOTES
Attends Synagogue service: None     Active member of club or organization: None     Attends meetings of clubs or organizations: None     Relationship status: None    Intimate partner violence     Fear of current or ex partner: None     Emotionally abused: None     Physically abused: None     Forced sexual activity: None   Other Topics Concern    None   Social History Narrative    None        SURGICAL HISTORY  No past surgical history on file. CURRENT MEDICATIONS  Current Outpatient Medications   Medication Sig Dispense Refill    simvastatin (ZOCOR) 20 MG tablet Take 1 tablet by mouth daily 90 tablet 1    levothyroxine (SYNTHROID) 150 MCG tablet Take 1 tablet by mouth Daily (Alternate with Levothyroxine 137 mcg) 45 tablet 1    fluticasone-vilanterol (BREO ELLIPTA) 100-25 MCG/INH AEPB inhaler Inhale 1 puff into the lungs daily 3 each 1    amLODIPine (NORVASC) 10 MG tablet Take 1 tablet by mouth daily 90 tablet 1    losartan (COZAAR) 25 MG tablet Take 1 tablet by mouth daily 90 tablet 1    albuterol sulfate HFA (VENTOLIN HFA) 108 (90 Base) MCG/ACT inhaler Inhale 2 puffs into the lungs every 6 hours as needed for Wheezing 1 Inhaler 11    Omega 3 1200 MG CAPS Take 1 capsule by mouth daily       No current facility-administered medications for this visit. ALLERGIES  Allergies   Allergen Reactions    Pcn [Penicillins]        PHYSICAL EXAM    /82 (Site: Left Upper Arm, Position: Sitting, Cuff Size: Medium Adult)   Pulse 72   Temp 97.5 °F (36.4 °C) (Temporal)   Ht 6' (1.829 m)   Wt 207 lb (93.9 kg)   SpO2 94%   BMI 28.07 kg/m²     Physical Exam    ASSESSMENT and PLAN    Carlo Mejia was seen today for follow-up. Diagnoses and all orders for this visit:    Essential hypertension    Secondary hypothyroidism  -     T4, Free; Future  -     TSH without Reflex; Future    Mixed hyperlipidemia  -     Comprehensive Metabolic Panel; Future  -     Lipid Panel;  Future    Hoarseness  -     External Referral To ENT        No follow-ups on file. Electronically signed by Albert Thornton MD on 6/10/2020    Please note that this chart was generated using dragon dictation software. Although every effort was made to ensure the accuracy of this automated transcription, some errors in transcription may have occurred.

## 2020-06-10 NOTE — PROGRESS NOTES
6/10/2020     Luke Brian      Chief Complaint   Patient presents with    Follow-up     6 week follow up       HPI      Sreekanth Pascual is a 70 y.o. male who presents today with the followin. Essential hypertension    2. Secondary hypothyroidism    3. Mixed hyperlipidemia    4. Hoarseness      The patient is here for follow-up  I change his thyroid dose 6 weeks ago said he feels great he has more energy does not have any jitteriness or side effects  He had labs done this week  REVIEW OF SYMPTOMS    Review of Systems   Constitutional: Negative for fever and unexpected weight change. HENT: Positive for voice change. Negative for rhinorrhea. Complaining about recent change in his voice a describes hoarseness  He smoked but quit 20 years ago  Does have any trouble swallowing he has a chronic cough which is unchanged  He did have radiation to his thyroid for hyperthyroidism many years ago   Respiratory: Positive for cough. Diagnosed with COPD  Is on Breo inhaler  He is following directions for correct use  He still has somewhat of a chronic cough   Cardiovascular: Negative for chest pain.         Hypertension and hyperlipidemia both well controlled on his medicine I reviewed his most recent labs  No history of heart disease   Endocrine:        Longstanding type both thyroidism following radioactive iodine therapy for Graves' disease probably 20 years ago       PAST MEDICAL HISTORY  Past Medical History:   Diagnosis Date    Chronic cough 2019    COPD, mild ([address] 2019    Dyspnea on exertion 2019    Essential hypertension     Former smoker 2019    Gastroesophageal reflux disease without esophagitis 2019   Tim Silver' disease in remission     History of colon polyps     Hyperlipidemia     Mild persistent asthma without complication 441    Postnasal drip 2019    Secondary hypothyroidism     4/3/1995- [facility]; 1995-[facility]       FAMILY HISTORY  Family History

## 2020-10-27 DIAGNOSIS — E78.2 MIXED HYPERLIPIDEMIA: ICD-10-CM

## 2020-10-27 DIAGNOSIS — E03.8 SECONDARY HYPOTHYROIDISM: ICD-10-CM

## 2020-10-27 LAB
A/G RATIO: 1.1 (ref 1.1–2.2)
ALBUMIN SERPL-MCNC: 4.1 G/DL (ref 3.4–5)
ALP BLD-CCNC: 94 U/L (ref 40–129)
ALT SERPL-CCNC: 25 U/L (ref 10–40)
ANION GAP SERPL CALCULATED.3IONS-SCNC: 14 MMOL/L (ref 3–16)
AST SERPL-CCNC: 19 U/L (ref 15–37)
BILIRUB SERPL-MCNC: 0.8 MG/DL (ref 0–1)
BUN BLDV-MCNC: 20 MG/DL (ref 7–20)
CALCIUM SERPL-MCNC: 9.4 MG/DL (ref 8.3–10.6)
CHLORIDE BLD-SCNC: 102 MMOL/L (ref 99–110)
CHOLESTEROL, TOTAL: 137 MG/DL (ref 0–199)
CO2: 23 MMOL/L (ref 21–32)
CREAT SERPL-MCNC: 1.1 MG/DL (ref 0.8–1.3)
GFR AFRICAN AMERICAN: >60
GFR NON-AFRICAN AMERICAN: >60
GLOBULIN: 3.8 G/DL
GLUCOSE BLD-MCNC: 87 MG/DL (ref 70–99)
HDLC SERPL-MCNC: 39 MG/DL (ref 40–60)
LDL CHOLESTEROL CALCULATED: 70 MG/DL
POTASSIUM SERPL-SCNC: 4.3 MMOL/L (ref 3.5–5.1)
SODIUM BLD-SCNC: 139 MMOL/L (ref 136–145)
T4 FREE: 1.5 NG/DL (ref 0.9–1.8)
TOTAL PROTEIN: 7.9 G/DL (ref 6.4–8.2)
TRIGL SERPL-MCNC: 142 MG/DL (ref 0–150)
TSH SERPL DL<=0.05 MIU/L-ACNC: 2.9 UIU/ML (ref 0.27–4.2)
VLDLC SERPL CALC-MCNC: 28 MG/DL

## 2020-11-02 ENCOUNTER — OFFICE VISIT (OUTPATIENT)
Dept: FAMILY MEDICINE CLINIC | Age: 75
End: 2020-11-02
Payer: MEDICARE

## 2020-11-02 VITALS
DIASTOLIC BLOOD PRESSURE: 76 MMHG | TEMPERATURE: 97.3 F | WEIGHT: 207.6 LBS | HEART RATE: 75 BPM | SYSTOLIC BLOOD PRESSURE: 128 MMHG | HEIGHT: 72 IN | OXYGEN SATURATION: 95 % | BODY MASS INDEX: 28.12 KG/M2

## 2020-11-02 PROCEDURE — G8417 CALC BMI ABV UP PARAM F/U: HCPCS | Performed by: FAMILY MEDICINE

## 2020-11-02 PROCEDURE — 3017F COLORECTAL CA SCREEN DOC REV: CPT | Performed by: FAMILY MEDICINE

## 2020-11-02 PROCEDURE — 1123F ACP DISCUSS/DSCN MKR DOCD: CPT | Performed by: FAMILY MEDICINE

## 2020-11-02 PROCEDURE — 4040F PNEUMOC VAC/ADMIN/RCVD: CPT | Performed by: FAMILY MEDICINE

## 2020-11-02 PROCEDURE — G8926 SPIRO NO PERF OR DOC: HCPCS | Performed by: FAMILY MEDICINE

## 2020-11-02 PROCEDURE — 1036F TOBACCO NON-USER: CPT | Performed by: FAMILY MEDICINE

## 2020-11-02 PROCEDURE — G8484 FLU IMMUNIZE NO ADMIN: HCPCS | Performed by: FAMILY MEDICINE

## 2020-11-02 PROCEDURE — G8427 DOCREV CUR MEDS BY ELIG CLIN: HCPCS | Performed by: FAMILY MEDICINE

## 2020-11-02 PROCEDURE — 3023F SPIROM DOC REV: CPT | Performed by: FAMILY MEDICINE

## 2020-11-02 PROCEDURE — 99213 OFFICE O/P EST LOW 20 MIN: CPT | Performed by: FAMILY MEDICINE

## 2020-11-02 RX ORDER — SIMVASTATIN 20 MG
20 TABLET ORAL DAILY
Qty: 90 TABLET | Refills: 1 | Status: SHIPPED | OUTPATIENT
Start: 2020-11-02 | End: 2021-05-03 | Stop reason: SDUPTHER

## 2020-11-02 RX ORDER — LOSARTAN POTASSIUM 25 MG/1
25 TABLET ORAL DAILY
Qty: 90 TABLET | Refills: 1 | Status: SHIPPED | OUTPATIENT
Start: 2020-11-02 | End: 2021-05-03 | Stop reason: SDUPTHER

## 2020-11-02 RX ORDER — AMLODIPINE BESYLATE 10 MG/1
10 TABLET ORAL DAILY
Qty: 90 TABLET | Refills: 1 | Status: SHIPPED | OUTPATIENT
Start: 2020-11-02 | End: 2021-05-03 | Stop reason: SDUPTHER

## 2020-11-02 RX ORDER — LEVOTHYROXINE SODIUM 0.15 MG/1
150 TABLET ORAL DAILY
Qty: 45 TABLET | Refills: 1 | Status: SHIPPED | OUTPATIENT
Start: 2020-11-02 | End: 2021-05-03 | Stop reason: SDUPTHER

## 2020-11-02 ASSESSMENT — ENCOUNTER SYMPTOMS
SHORTNESS OF BREATH: 0
COUGH: 0
SORE THROAT: 0

## 2020-11-02 NOTE — PROGRESS NOTES
2020     Shelton Nunn      Chief Complaint   Patient presents with    6 Month Follow-Up    Medication Refill       HPI      Efrain Dean is a 70 y.o. male who presents today with the followin. Chronic obstructive pulmonary disease, unspecified COPD type ([address]    2. Essential hypertension    3. Mixed hyperlipidemia    4. Graves' disease in remission    5. Secondary hypothyroidism      Is here for checkup  He had labs done last week  He is up-to-date on immunizations  He is going to be going to [place] for the winter    REVIEW OF SYMPTOMS    Review of Systems   Constitutional: Positive for activity change. Negative for fever and unexpected weight change. Less active due to COVID-19 restrictions  Has had no known exposure  He lives with his wife  Neither has been ill   HENT: Negative for congestion and sore throat. Respiratory: Negative for cough and shortness of breath. Has a diagnosis of COPD  He no longer smokes  He is on Breo  He never needs albuterol  He is able to do all of his normal activities   Cardiovascular: Negative for chest pain. Hypertension and hyperlipidemia both under control on diet and medicine   Endocrine:        Patient previously had Graves' disease  He was treated with radioactive iodine  He said no recurrence  He is on thyroid replacement   Genitourinary: Negative for difficulty urinating. Musculoskeletal: Negative for arthralgias. Neurological: Negative. Psychiatric/Behavioral: Negative.         PAST MEDICAL HISTORY  Past Medical History:   Diagnosis Date    Chronic cough 2019    COPD, mild ([address] 2019    Dyspnea on exertion 2019    Essential hypertension     Former smoker 2019    Gastroesophageal reflux disease without esophagitis 2019   Randye Rathke' disease in remission     History of colon polyps     Hyperlipidemia     Mild persistent asthma without complication 3/1/3118    Postnasal drip 2019    Secondary hypothyroidism     4/3/1995- [facility]; 1995-[facility]       FAMILY HISTORY  Family History   Problem Relation Age of Onset    Cancer Father         Bladder       SOCIAL HISTORY  Social History     Socioeconomic History    Marital status:      Spouse name: None    Number of children: None    Years of education: None    Highest education level: None   Occupational History    Occupation:      Comment: Retired   Social Needs    Financial resource strain: None    Food insecurity     Worry: None     Inability: None    Transportation needs     Medical: None     Non-medical: None   Tobacco Use    Smoking status: Former Smoker     Packs/day: 1.00     Years: 20.00     Pack years: 20.00     Types: Cigarettes     Start date: 1974     Last attempt to quit: 1994     Years since quittin.4    Smokeless tobacco: Never Used   Substance and Sexual Activity    Alcohol use: None    Drug use: None    Sexual activity: None   Lifestyle    Physical activity     Days per week: None     Minutes per session: None    Stress: None   Relationships    Social connections     Talks on phone: None     Gets together: None     Attends Restorationism service: None     Active member of club or organization: None     Attends meetings of clubs or organizations: None     Relationship status: None    Intimate partner violence     Fear of current or ex partner: None     Emotionally abused: None     Physically abused: None     Forced sexual activity: None   Other Topics Concern    None   Social History Narrative    None        SURGICAL HISTORY  History reviewed. No pertinent surgical history.     CURRENT MEDICATIONS  Current Outpatient Medications   Medication Sig Dispense Refill    amLODIPine (NORVASC) 10 MG tablet Take 1 tablet by mouth daily 90 tablet 1    levothyroxine (SYNTHROID) 150 MCG tablet Take 1 tablet by mouth Daily (Alternate with Levothyroxine 137 mcg) 45 tablet 1    losartan (COZAAR) 25 MG tablet Take 1 tablet by mouth daily 90 tablet 1    simvastatin (ZOCOR) 20 MG tablet Take 1 tablet by mouth daily 90 tablet 1    fluticasone-vilanterol (BREO ELLIPTA) 100-25 MCG/INH AEPB inhaler Inhale 1 puff into the lungs daily 3 each 1    albuterol sulfate HFA (VENTOLIN HFA) 108 (90 Base) MCG/ACT inhaler Inhale 2 puffs into the lungs every 6 hours as needed for Wheezing 1 Inhaler 11    Omega 3 1200 MG CAPS Take 1 capsule by mouth daily       No current facility-administered medications for this visit. ALLERGIES  Allergies   Allergen Reactions    Pcn [Penicillins]        PHYSICAL EXAM    /76 (Site: Right Upper Arm, Position: Sitting, Cuff Size: Medium Adult)   Pulse 75   Temp 97.3 °F (36.3 °C) (Temporal)   Ht 6' (1.829 m)   Wt 207 lb 9.6 oz (94.2 kg)   SpO2 95%   BMI 28.16 kg/m²     Physical Exam  Vitals signs and nursing note reviewed. Constitutional:       General: He is not in acute distress. Appearance: Normal appearance. HENT:      Right Ear: External ear normal.      Left Ear: External ear normal.   Eyes:      Conjunctiva/sclera: Conjunctivae normal.   Cardiovascular:      Rate and Rhythm: Normal rate. Pulmonary:      Effort: Pulmonary effort is normal. No respiratory distress. Neurological:      General: No focal deficit present. Mental Status: He is alert. Psychiatric:         Mood and Affect: Mood normal.     Recent labs reviewed  Everything came back in range  Medications reconciled  Immunizations reviewed and are up-to-date    ASSESSMENT and PLAN    Elihu Pallas was seen today for 6 month follow-up and medication refill. Diagnoses and all orders for this visit:    Chronic obstructive pulmonary disease, unspecified COPD type ([facility]    Essential hypertension  -     amLODIPine (NORVASC) 10 MG tablet; Take 1 tablet by mouth daily  -     Comprehensive Metabolic Panel; Future    Mixed hyperlipidemia  -     simvastatin (ZOCOR) 20 MG tablet;  Take 1 tablet by mouth daily  - Lipid Panel; Future    Graves' disease in remission  -     TSH without Reflex; Future  -     T4, Free; Future    Secondary hypothyroidism    Other orders  -     levothyroxine (SYNTHROID) 150 MCG tablet; Take 1 tablet by mouth Daily (Alternate with Levothyroxine 137 mcg)  -     losartan (COZAAR) 25 MG tablet; Take 1 tablet by mouth daily    Patient is stable  Continue same medications  Complete shingles vaccine in the next 6 months  Follow-up here in 6 months and get labs prior to that office visit    Return in about 6 months (around 5/2/2021). Electronically signed by Roger Mott MD on 11/2/2020    Please note that this chart was generated using dragon dictation software. Although every effort was made to ensure the accuracy of this automated transcription, some errors in transcription may have occurred.

## 2020-11-10 ENCOUNTER — NURSE ONLY (OUTPATIENT)
Dept: PULMONOLOGY | Age: 75
End: 2020-11-10
Payer: MEDICARE

## 2020-11-10 DIAGNOSIS — J44.9 COPD, MILD (HCC): ICD-10-CM

## 2020-11-10 DIAGNOSIS — J45.30 MILD PERSISTENT ASTHMA WITHOUT COMPLICATION: ICD-10-CM

## 2020-11-10 LAB
EXPIRATORY TIME-POST: NORMAL
EXPIRATORY TIME: NORMAL
FEF 25-75% %CHNG: NORMAL
FEF 25-75% %PRED-POST: NORMAL
FEF 25-75% %PRED-PRE: NORMAL
FEF 25-75% PRED: NORMAL
FEF 25-75%-POST: NORMAL
FEF 25-75%-PRE: NORMAL
FEV1 %PRED-POST: 94.8 %
FEV1 %PRED-PRE: 91.2 %
FEV1 PRED: 3.4 L
FEV1-POST: 3.22 L
FEV1-PRE: 3.1 L
FEV1/FVC %PRED-POST: 108.2 %
FEV1/FVC %PRED-PRE: 107.8 %
FEV1/FVC PRED: 72.9 %
FEV1/FVC-POST: 78.8 %
FEV1/FVC-PRE: 78.6 %
FVC %PRED-POST: 87.8 L
FVC %PRED-PRE: 84.8 %
FVC PRED: 4.65 L
FVC-POST: 4.09 L
FVC-PRE: 3.95 L
PEF %PRED-POST: NORMAL
PEF %PRED-PRE: NORMAL
PEF PRED: NORMAL
PEF%CHNG: NORMAL
PEF-POST: NORMAL
PEF-PRE: NORMAL

## 2020-11-10 PROCEDURE — 94060 EVALUATION OF WHEEZING: CPT | Performed by: INTERNAL MEDICINE

## 2020-11-10 ASSESSMENT — PULMONARY FUNCTION TESTS
FVC_PERCENT_PREDICTED_PRE: 84.8
FVC_POST: 4.09
FEV1/FVC_POST: 78.8
FVC_PREDICTED: 4.65
FEV1_PREDICTED: 3.40
FEV1/FVC_PERCENT_PREDICTED_PRE: 107.8
FEV1/FVC_PERCENT_PREDICTED_POST: 108.2
FEV1_PERCENT_PREDICTED_PRE: 91.2
FVC_PERCENT_PREDICTED_POST: 87.8
FEV1/FVC_PREDICTED: 72.9
FEV1_PRE: 3.10
FEV1_POST: 3.22
FEV1/FVC_PRE: 78.6
FVC_PRE: 3.95
FEV1_PERCENT_PREDICTED_POST: 94.8

## 2020-12-08 ENCOUNTER — OFFICE VISIT (OUTPATIENT)
Dept: PULMONOLOGY | Age: 75
End: 2020-12-08
Payer: MEDICARE

## 2020-12-08 VITALS
WEIGHT: 207 LBS | HEART RATE: 83 BPM | SYSTOLIC BLOOD PRESSURE: 128 MMHG | DIASTOLIC BLOOD PRESSURE: 80 MMHG | OXYGEN SATURATION: 98 % | BODY MASS INDEX: 28.04 KG/M2 | HEIGHT: 72 IN

## 2020-12-08 PROCEDURE — G8484 FLU IMMUNIZE NO ADMIN: HCPCS | Performed by: INTERNAL MEDICINE

## 2020-12-08 PROCEDURE — G8427 DOCREV CUR MEDS BY ELIG CLIN: HCPCS | Performed by: INTERNAL MEDICINE

## 2020-12-08 PROCEDURE — G8417 CALC BMI ABV UP PARAM F/U: HCPCS | Performed by: INTERNAL MEDICINE

## 2020-12-08 PROCEDURE — 4040F PNEUMOC VAC/ADMIN/RCVD: CPT | Performed by: INTERNAL MEDICINE

## 2020-12-08 PROCEDURE — 1123F ACP DISCUSS/DSCN MKR DOCD: CPT | Performed by: INTERNAL MEDICINE

## 2020-12-08 PROCEDURE — 99213 OFFICE O/P EST LOW 20 MIN: CPT | Performed by: INTERNAL MEDICINE

## 2020-12-08 PROCEDURE — 1036F TOBACCO NON-USER: CPT | Performed by: INTERNAL MEDICINE

## 2020-12-08 PROCEDURE — 3017F COLORECTAL CA SCREEN DOC REV: CPT | Performed by: INTERNAL MEDICINE

## 2020-12-08 RX ORDER — FLUTICASONE FUROATE AND VILANTEROL TRIFENATATE 100; 25 UG/1; UG/1
1 POWDER RESPIRATORY (INHALATION) DAILY
Qty: 3 EACH | Refills: 3 | Status: SHIPPED | OUTPATIENT
Start: 2020-12-08 | End: 2021-06-09 | Stop reason: SDUPTHER

## 2020-12-08 NOTE — PROGRESS NOTES
SUBJECTIVE:  Chief Complaint: Mild persistent asthma, former smoker, chronic cough  Srikanth Helton states that he has had no issues with his asthma and continues on Breo daily but has not required his rescue albuterol inhaler. He denies shortness of breath getting worse but still has some mild dyspnea on exertion. His major complaint is a constant clearing of his throat which is gotten worse over the past 3 to 4 months. He states that he has been on Flonase in the past but currently is not using it and is not complaining of nasal congestion and is not certain if this is postnasal drip or not. He is not expectorating purulent sputum and denies fever or chills and has had no recent episodes of bronchitis. He has had no known COVID-19 exposure and denies fever associated with cough. He has been socially distancing himself      ROS:  Constitution:  HEENT: Negative for ear, throat pain  Cardiovascular: Negative for chest pain, syncope, edema  Pulmonary: See HPI  Musculoskeletal: Negative for DVT, myalgias, arthralgias    OBJECTIVE:  /80 (Site: Right Upper Arm, Position: Sitting, Cuff Size: Large Adult)   Pulse 83   Ht 6' (1.829 m)   Wt 207 lb (93.9 kg)   SpO2 98%   BMI 28.07 kg/m²      Physical Exam:  Constitutional:  He appears well developed and well-nourished. Neck:  Supple, No palpable lymphadenopathy, No JVD  Cardiovascular:  S1, S2 Normal, Regular rhythm, no murmurs or gallops, No pericardial  rubs. Pulmonary: Breath sounds are clear throughout all areas although mildly diminished. I hear no wheezing or rhonchi. Abdomen: Not examined  Extremities: no edema, No DVT  Neurologic: Oriented x3, No focal deficits    Radiology: Chest x-ray on 4/29/2019 was normal  PFT: Office spirometry on 11/10/2020 demonstrated no significant airways obstruction and no significant response to bronchodilators      Echocardiogram: No echo available    ASSESSMENT:    1. Mild persistent asthma without complication    2.  Dyspnea on exertion    3. Chronic cough    4. Former smoker          PLAN:   I told him to try a decongestant like Zyrtec or Allegra and see if that helps with his throat congestion. I will make no change in his current bronchodilators and did renew his Breo. I will continue to follow him    We have discussed the need to maintain yearly flu immunization, pneumococcal vaccination. We have discussed Coronavirus precaution including handwashing practice, wiping items touched in public such as gas pumps, door handles, shopping carts, etc. Self monitoring for infection - fever, chills, cough, SOB. Should they develop symptoms they should call office for further instructions. Return in about 6 months (around 6/8/2021) for Recheck for Asthma, Recheck for Shortness of Breath. This dictation was performed with a verbal recognition program and it was checked for errors. It is possible that there are still dictated errors within this office note. Any errors should be brought immediately to my attention for correction. All efforts were made to ensure that this office note is accurate.

## 2021-04-26 DIAGNOSIS — E78.2 MIXED HYPERLIPIDEMIA: ICD-10-CM

## 2021-04-26 DIAGNOSIS — I10 ESSENTIAL HYPERTENSION: ICD-10-CM

## 2021-04-26 DIAGNOSIS — E05.00 GRAVES' DISEASE IN REMISSION: ICD-10-CM

## 2021-04-26 LAB
A/G RATIO: 1.3 (ref 1.1–2.2)
ALBUMIN SERPL-MCNC: 4.4 G/DL (ref 3.4–5)
ALP BLD-CCNC: 90 U/L (ref 40–129)
ALT SERPL-CCNC: 26 U/L (ref 10–40)
ANION GAP SERPL CALCULATED.3IONS-SCNC: 14 MMOL/L (ref 3–16)
AST SERPL-CCNC: 18 U/L (ref 15–37)
BILIRUB SERPL-MCNC: 0.8 MG/DL (ref 0–1)
BUN BLDV-MCNC: 13 MG/DL (ref 7–20)
CALCIUM SERPL-MCNC: 9.4 MG/DL (ref 8.3–10.6)
CHLORIDE BLD-SCNC: 104 MMOL/L (ref 99–110)
CHOLESTEROL, TOTAL: 142 MG/DL (ref 0–199)
CO2: 23 MMOL/L (ref 21–32)
CREAT SERPL-MCNC: 1.1 MG/DL (ref 0.8–1.3)
GFR AFRICAN AMERICAN: >60
GFR NON-AFRICAN AMERICAN: >60
GLOBULIN: 3.4 G/DL
GLUCOSE BLD-MCNC: 79 MG/DL (ref 70–99)
HDLC SERPL-MCNC: 41 MG/DL (ref 40–60)
LDL CHOLESTEROL CALCULATED: 72 MG/DL
POTASSIUM SERPL-SCNC: 4.3 MMOL/L (ref 3.5–5.1)
SODIUM BLD-SCNC: 141 MMOL/L (ref 136–145)
TOTAL PROTEIN: 7.8 G/DL (ref 6.4–8.2)
TRIGL SERPL-MCNC: 147 MG/DL (ref 0–150)
VLDLC SERPL CALC-MCNC: 29 MG/DL

## 2021-04-27 LAB
T4 FREE: 1.1 NG/DL (ref 0.9–1.8)
TSH SERPL DL<=0.05 MIU/L-ACNC: 12.35 UIU/ML (ref 0.27–4.2)

## 2021-05-03 ENCOUNTER — OFFICE VISIT (OUTPATIENT)
Dept: FAMILY MEDICINE CLINIC | Age: 76
End: 2021-05-03
Payer: MEDICARE

## 2021-05-03 VITALS
OXYGEN SATURATION: 95 % | SYSTOLIC BLOOD PRESSURE: 118 MMHG | BODY MASS INDEX: 28.44 KG/M2 | HEART RATE: 68 BPM | WEIGHT: 210 LBS | DIASTOLIC BLOOD PRESSURE: 84 MMHG | HEIGHT: 72 IN

## 2021-05-03 DIAGNOSIS — E05.00 GRAVES' DISEASE IN REMISSION: ICD-10-CM

## 2021-05-03 DIAGNOSIS — E78.2 MIXED HYPERLIPIDEMIA: ICD-10-CM

## 2021-05-03 DIAGNOSIS — J42 CHRONIC BRONCHITIS, UNSPECIFIED CHRONIC BRONCHITIS TYPE (HCC): Primary | ICD-10-CM

## 2021-05-03 DIAGNOSIS — M79.18 RHOMBOID MUSCLE PAIN: ICD-10-CM

## 2021-05-03 DIAGNOSIS — I10 ESSENTIAL HYPERTENSION: ICD-10-CM

## 2021-05-03 DIAGNOSIS — Z86.010 HISTORY OF COLON POLYPS: ICD-10-CM

## 2021-05-03 PROCEDURE — G8417 CALC BMI ABV UP PARAM F/U: HCPCS | Performed by: FAMILY MEDICINE

## 2021-05-03 PROCEDURE — G8427 DOCREV CUR MEDS BY ELIG CLIN: HCPCS | Performed by: FAMILY MEDICINE

## 2021-05-03 PROCEDURE — 3023F SPIROM DOC REV: CPT | Performed by: FAMILY MEDICINE

## 2021-05-03 PROCEDURE — 4040F PNEUMOC VAC/ADMIN/RCVD: CPT | Performed by: FAMILY MEDICINE

## 2021-05-03 PROCEDURE — G8926 SPIRO NO PERF OR DOC: HCPCS | Performed by: FAMILY MEDICINE

## 2021-05-03 PROCEDURE — 1036F TOBACCO NON-USER: CPT | Performed by: FAMILY MEDICINE

## 2021-05-03 PROCEDURE — 1123F ACP DISCUSS/DSCN MKR DOCD: CPT | Performed by: FAMILY MEDICINE

## 2021-05-03 PROCEDURE — 99214 OFFICE O/P EST MOD 30 MIN: CPT | Performed by: FAMILY MEDICINE

## 2021-05-03 PROCEDURE — 3017F COLORECTAL CA SCREEN DOC REV: CPT | Performed by: FAMILY MEDICINE

## 2021-05-03 RX ORDER — LOSARTAN POTASSIUM 25 MG/1
25 TABLET ORAL DAILY
Qty: 90 TABLET | Refills: 1 | Status: SHIPPED | OUTPATIENT
Start: 2021-05-03 | End: 2021-11-01 | Stop reason: SDUPTHER

## 2021-05-03 RX ORDER — AMLODIPINE BESYLATE 10 MG/1
10 TABLET ORAL DAILY
Qty: 90 TABLET | Refills: 1 | Status: SHIPPED | OUTPATIENT
Start: 2021-05-03 | End: 2021-11-01 | Stop reason: SDUPTHER

## 2021-05-03 RX ORDER — SIMVASTATIN 20 MG
20 TABLET ORAL DAILY
Qty: 90 TABLET | Refills: 1 | Status: SHIPPED | OUTPATIENT
Start: 2021-05-03 | End: 2021-11-01 | Stop reason: SDUPTHER

## 2021-05-03 RX ORDER — LEVOTHYROXINE SODIUM 175 UG/1
175 TABLET ORAL DAILY
Qty: 90 TABLET | Refills: 1 | Status: SHIPPED | OUTPATIENT
Start: 2021-05-03 | End: 2021-11-01 | Stop reason: SDUPTHER

## 2021-05-03 ASSESSMENT — ENCOUNTER SYMPTOMS
COUGH: 0
SHORTNESS OF BREATH: 0

## 2021-05-03 ASSESSMENT — PATIENT HEALTH QUESTIONNAIRE - PHQ9
SUM OF ALL RESPONSES TO PHQ QUESTIONS 1-9: 0
SUM OF ALL RESPONSES TO PHQ9 QUESTIONS 1 & 2: 0
2. FEELING DOWN, DEPRESSED OR HOPELESS: 0

## 2021-05-03 NOTE — PROGRESS NOTES
5/3/2021     Silvia Maravilla      Chief Complaint   Patient presents with    6 Month Follow-Up    Back Pain     pt reports pulled muscle in back, left upper back pain       HPI      Kaelyn Ellis is a 78 y.o. male who presents today with the followin. Chronic bronchitis, unspecified chronic bronchitis type ([address]    2. Essential hypertension    3. Mixed hyperlipidemia    4. Graves' disease in remission    5. History of colon polyps    6. Rhomboid muscle pain    Here for 6-month follow-up  Is doing pretty well  Only new problem is sinus pain around the area of his left shoulder blade  Can really say remember any injury  It does not have a radicular pattern  Its not in the chest itself    REVIEW OF SYMPTOMS    Review of Systems   Constitutional: Negative. Respiratory: Negative for cough and shortness of breath.          He carries a diagnosis of COPD but is minimally symptomatic  He no longer smokes   Cardiovascular:        History of hyperlipidemia and hypothyroidism well-controlled on diet and medication   Gastrointestinal:        History of colon polyps  He is overdue for colonoscopy that will be set up   Endocrine:        Patient had previously had Graves' disease  Was treated with radioactive iodine  Now is hypothyroid  Is on levothyroxine       PAST MEDICAL HISTORY  Past Medical History:   Diagnosis Date    Chronic cough 2019    COPD, mild ([address] 2019    Dyspnea on exertion 2019    Essential hypertension     Former smoker 2019    Gastroesophageal reflux disease without esophagitis 2019   Laveta Mura' disease in remission     History of colon polyps     Hyperlipidemia     Mild persistent asthma without complication 3/1/2211    Postnasal drip 2019    Secondary hypothyroidism     4/3/1995- [facility]; 1995-[facility]       FAMILY HISTORY  Family History   Problem Relation Age of Onset    Cancer Father         Bladder       SOCIAL HISTORY  Social History     Socioeconomic History  Marital status:      Spouse name: None    Number of children: None    Years of education: None    Highest education level: None   Occupational History    Occupation:      Comment: Retired   Social Needs    Financial resource strain: None    Food insecurity     Worry: None     Inability: None    Transportation needs     Medical: None     Non-medical: None   Tobacco Use    Smoking status: Former Smoker     Packs/day: 1.00     Years: 20.00     Pack years: 20.00     Types: Cigarettes     Start date: 1974     Quit date: 1994     Years since quittin.9    Smokeless tobacco: Never Used   Substance and Sexual Activity    Alcohol use: Yes     Comment: occ. once a week    Drug use: None    Sexual activity: None   Lifestyle    Physical activity     Days per week: None     Minutes per session: None    Stress: None   Relationships    Social connections     Talks on phone: None     Gets together: None     Attends Mandaen service: None     Active member of club or organization: None     Attends meetings of clubs or organizations: None     Relationship status: None    Intimate partner violence     Fear of current or ex partner: None     Emotionally abused: None     Physically abused: None     Forced sexual activity: None   Other Topics Concern    None   Social History Narrative    None        SURGICAL HISTORY  No past surgical history on file.     CURRENT MEDICATIONS  Current Outpatient Medications   Medication Sig Dispense Refill    amLODIPine (NORVASC) 10 MG tablet Take 1 tablet by mouth daily 90 tablet 1    levothyroxine (SYNTHROID) 175 MCG tablet Take 1 tablet by mouth Daily (Alternate with Levothyroxine 137 mcg) 90 tablet 1    losartan (COZAAR) 25 MG tablet Take 1 tablet by mouth daily 90 tablet 1    simvastatin (ZOCOR) 20 MG tablet Take 1 tablet by mouth daily 90 tablet 1    fluticasone-vilanterol (BREO ELLIPTA) 100-25 MCG/INH AEPB inhaler Inhale 1 puff into the lungs Refer for colonoscopy      Return in about 6 months (around 11/3/2021). Electronically signed by Stephanie Chicas MD on 5/3/2021    Please note that this chart was generated using dragon dictation software. Although every effort was made to ensure the accuracy of this automated transcription, some errors in transcription may have occurred.

## 2021-06-08 ENCOUNTER — OFFICE VISIT (OUTPATIENT)
Dept: GASTROENTEROLOGY | Age: 76
End: 2021-06-08

## 2021-06-08 VITALS
BODY MASS INDEX: 28.63 KG/M2 | OXYGEN SATURATION: 99 % | DIASTOLIC BLOOD PRESSURE: 78 MMHG | RESPIRATION RATE: 18 BRPM | WEIGHT: 211.4 LBS | SYSTOLIC BLOOD PRESSURE: 130 MMHG | HEART RATE: 84 BPM | HEIGHT: 72 IN

## 2021-06-08 DIAGNOSIS — Z86.010 HISTORY OF COLON POLYPS: Primary | ICD-10-CM

## 2021-06-08 PROCEDURE — 4040F PNEUMOC VAC/ADMIN/RCVD: CPT | Performed by: NURSE PRACTITIONER

## 2021-06-08 PROCEDURE — 3017F COLORECTAL CA SCREEN DOC REV: CPT | Performed by: NURSE PRACTITIONER

## 2021-06-08 PROCEDURE — 99999 PR OFFICE/OUTPT VISIT,PROCEDURE ONLY: CPT | Performed by: NURSE PRACTITIONER

## 2021-06-08 PROCEDURE — G8427 DOCREV CUR MEDS BY ELIG CLIN: HCPCS | Performed by: NURSE PRACTITIONER

## 2021-06-08 PROCEDURE — 1123F ACP DISCUSS/DSCN MKR DOCD: CPT | Performed by: NURSE PRACTITIONER

## 2021-06-08 PROCEDURE — 1036F TOBACCO NON-USER: CPT | Performed by: NURSE PRACTITIONER

## 2021-06-08 PROCEDURE — G8417 CALC BMI ABV UP PARAM F/U: HCPCS | Performed by: NURSE PRACTITIONER

## 2021-06-08 RX ORDER — BISACODYL 5 MG
TABLET, DELAYED RELEASE (ENTERIC COATED) ORAL
Qty: 4 TABLET | Refills: 0 | Status: SHIPPED | OUTPATIENT
Start: 2021-06-08 | End: 2022-07-21 | Stop reason: ALTCHOICE

## 2021-06-08 RX ORDER — POLYETHYLENE GLYCOL 3350 17 G/17G
238 POWDER, FOR SOLUTION ORAL ONCE
Qty: 1 BOTTLE | Refills: 0 | Status: SHIPPED | OUTPATIENT
Start: 2021-06-08 | End: 2021-06-08

## 2021-06-08 ASSESSMENT — ENCOUNTER SYMPTOMS
COLOR CHANGE: 0
EYE PAIN: 0
EYE DISCHARGE: 0
WHEEZING: 0
SHORTNESS OF BREATH: 0
BLOOD IN STOOL: 0
ABDOMINAL PAIN: 0
VOMITING: 0
DIARRHEA: 0
COUGH: 1
CONSTIPATION: 0
BACK PAIN: 1
NAUSEA: 0

## 2021-06-08 NOTE — PROGRESS NOTES
Dora Alves 78 y.o. male was seen by ROBERT Delgado on 6/8/2021     Wt Readings from Last 3 Encounters:   06/08/21 211 lb 6.4 oz (95.9 kg)   05/03/21 210 lb (95.3 kg)   12/08/20 207 lb (93.9 kg)       FLORENCE Alves is a pleasant 78 y.o.  male who presents today for history of colon polyps. He has a past medical history of chronic cough, COPD, mild, dyspnea on exertion, essential hypertension, former smoker, gastroesophageal reflux disease without esophagitis, Graves' disease in remission, history of colon polyps, hyperlipidemia, mild persistent asthma without complication, postnasal drip, and secondary hypothyroidism. His last EGD was done in the last year by uncertain provider with normal results. His last colonoscopy was done by Dr. Sofiya Brito on 6- showing left sided diverticulosis, non-bleeding internal hemorrhoids and colon polyps that was removed. He denies changes in his bowel pattern. His typical bowel pattern is every two to three days with soft brown formed stools. No diarrhea or constipation. No blood in his stools or melena. No excess belching or flatulence. His appetite is good without early satiety. His weight is stable. No nausea or vomiting. He denies current symptoms of heartburn or acid reflux. No nocturnal awakenings with acid reflux. Tums use every couple weeks after eating pizza or red sauces. No dysphagia or pain with swallowing. No abdominal pain, bloating or distention. No family history of stomach or colon cancer. ROS  Review of Systems   Constitutional: Positive for fatigue. Negative for appetite change, chills, diaphoresis, fever and unexpected weight change. HENT: Positive for hearing loss. Negative for ear pain and tinnitus. Eyes: Positive for visual disturbance. Negative for pain and discharge. Respiratory: Positive for cough. Negative for shortness of breath and wheezing.          COPD   Cardiovascular: Negative for chest pain, palpitations and leg swelling. Gastrointestinal: Negative for abdominal pain, blood in stool, constipation, diarrhea, nausea and vomiting. Endocrine: Negative for cold intolerance and heat intolerance. Genitourinary: Positive for frequency. Negative for dysuria, hematuria and urgency. Musculoskeletal: Positive for back pain (intermittent). Negative for myalgias and neck pain. Skin: Negative for color change, pallor and rash. Allergic/Immunologic: Negative for environmental allergies and food allergies. Neurological: Negative for dizziness, seizures, weakness and headaches. Hematological: Bruises/bleeds easily. Psychiatric/Behavioral: Negative for dysphoric mood and sleep disturbance. The patient is not nervous/anxious. Allergies  Allergies   Allergen Reactions    Pcn [Penicillins]        Medications  Current Outpatient Medications   Medication Sig Dispense Refill    amLODIPine (NORVASC) 10 MG tablet Take 1 tablet by mouth daily 90 tablet 1    levothyroxine (SYNTHROID) 175 MCG tablet Take 1 tablet by mouth Daily (Alternate with Levothyroxine 137 mcg) 90 tablet 1    losartan (COZAAR) 25 MG tablet Take 1 tablet by mouth daily 90 tablet 1    simvastatin (ZOCOR) 20 MG tablet Take 1 tablet by mouth daily 90 tablet 1    fluticasone-vilanterol (BREO ELLIPTA) 100-25 MCG/INH AEPB inhaler Inhale 1 puff into the lungs daily After inhalation then gargle 3 each 3    fluticasone-vilanterol (BREO ELLIPTA) 100-25 MCG/INH AEPB inhaler Inhale 1 puff into the lungs daily 3 each 1    albuterol sulfate HFA (VENTOLIN HFA) 108 (90 Base) MCG/ACT inhaler Inhale 2 puffs into the lungs every 6 hours as needed for Wheezing 1 Inhaler 11    Omega 3 1200 MG CAPS Take 1 capsule by mouth daily       No current facility-administered medications for this visit.        Past medical history:   He has a past medical history of Chronic cough, COPD, mild ([address] Dyspnea on exertion, Essential hypertension, Former smoker, Gastroesophageal reflux disease without esophagitis, Graves' disease in remission, History of colon polyps, Hyperlipidemia, Mild persistent asthma without complication, Postnasal drip, and Secondary hypothyroidism. Past surgical history:  He has no past surgical history on file. Social History:  He reports that he quit smoking about 27 years ago. His smoking use included cigarettes. He started smoking about 46 years ago. He has a 20.00 pack-year smoking history. He has never used smokeless tobacco. He reports current alcohol use. Family history:  His family history includes Cancer in his father. Objective    Vitals:    06/08/21 1034   BP: 130/78   Pulse: 84   Resp: 18   SpO2: 99%        Physical exam    Physical Exam  Vitals reviewed. Constitutional:       General: He is not in acute distress. Appearance: Normal appearance. He is well-developed. He is not ill-appearing, toxic-appearing or diaphoretic. HENT:      Head: Normocephalic and atraumatic. Nose: Nose normal.      Mouth/Throat:      Mouth: Mucous membranes are moist.   Eyes:      Conjunctiva/sclera: Conjunctivae normal.      Pupils: Pupils are equal, round, and reactive to light. Neck:      Thyroid: No thyromegaly. Vascular: No JVD. Trachea: No tracheal deviation. Cardiovascular:      Rate and Rhythm: Normal rate and regular rhythm. Pulses: Normal pulses. Heart sounds: Normal heart sounds. No murmur heard. No friction rub. No gallop. Pulmonary:      Effort: Pulmonary effort is normal. No respiratory distress. Breath sounds: Normal breath sounds. No stridor. No wheezing, rhonchi or rales. Chest:      Chest wall: No tenderness. Abdominal:      General: Bowel sounds are normal. There is no distension. Palpations: Abdomen is soft. There is no mass. Tenderness: There is no abdominal tenderness. There is no guarding or rebound. Hernia: No hernia is present.    Musculoskeletal: General: Normal range of motion. Cervical back: Normal range of motion and neck supple. Lymphadenopathy:      Cervical: No cervical adenopathy. Skin:     General: Skin is warm and dry. Neurological:      Mental Status: He is alert and oriented to person, place, and time. Psychiatric:         Mood and Affect: Mood normal.         Orders Only on 04/26/2021   Component Date Value Ref Range Status    T4 Free 04/26/2021 1.1  0.9 - 1.8 ng/dL Final    TSH 04/26/2021 12.35* 0.27 - 4.20 uIU/mL Final    Sodium 04/26/2021 141  136 - 145 mmol/L Final    Potassium 04/26/2021 4.3  3.5 - 5.1 mmol/L Final    Chloride 04/26/2021 104  99 - 110 mmol/L Final    CO2 04/26/2021 23  21 - 32 mmol/L Final    Anion Gap 04/26/2021 14  3 - 16 Final    Glucose 04/26/2021 79  70 - 99 mg/dL Final    BUN 04/26/2021 13  7 - 20 mg/dL Final    CREATININE 04/26/2021 1.1  0.8 - 1.3 mg/dL Final    GFR Non- 04/26/2021 >60  >60 Final    Comment: >60 mL/min/1.73m2 EGFR, calc. for ages 25 and older using the  MDRD formula (not corrected for weight), is valid for stable  renal function.  GFR  04/26/2021 >60  >60 Final    Comment: Chronic Kidney Disease: less than 60 ml/min/1.73 sq.m. Kidney Failure: less than 15 ml/min/1.73 sq.m. Results valid for patients 18 years and older.       Calcium 04/26/2021 9.4  8.3 - 10.6 mg/dL Final    Total Protein 04/26/2021 7.8  6.4 - 8.2 g/dL Final    Albumin 04/26/2021 4.4  3.4 - 5.0 g/dL Final    Albumin/Globulin Ratio 04/26/2021 1.3  1.1 - 2.2 Final    Total Bilirubin 04/26/2021 0.8  0.0 - 1.0 mg/dL Final    Alkaline Phosphatase 04/26/2021 90  40 - 129 U/L Final    ALT 04/26/2021 26  10 - 40 U/L Final    AST 04/26/2021 18  15 - 37 U/L Final    Globulin 04/26/2021 3.4  g/dL Final    Cholesterol, Total 04/26/2021 142  0 - 199 mg/dL Final    Triglycerides 04/26/2021 147  0 - 150 mg/dL Final    HDL 04/26/2021 41  40 - 60 mg/dL Final    LDL

## 2021-06-09 ENCOUNTER — PREP FOR PROCEDURE (OUTPATIENT)
Dept: GASTROENTEROLOGY | Age: 76
End: 2021-06-09

## 2021-06-09 ENCOUNTER — OFFICE VISIT (OUTPATIENT)
Dept: PULMONOLOGY | Age: 76
End: 2021-06-09
Payer: MEDICARE

## 2021-06-09 VITALS
OXYGEN SATURATION: 97 % | HEART RATE: 68 BPM | HEIGHT: 72 IN | WEIGHT: 204 LBS | DIASTOLIC BLOOD PRESSURE: 68 MMHG | BODY MASS INDEX: 27.63 KG/M2 | SYSTOLIC BLOOD PRESSURE: 108 MMHG

## 2021-06-09 DIAGNOSIS — J45.30 MILD PERSISTENT ASTHMA WITHOUT COMPLICATION: Primary | ICD-10-CM

## 2021-06-09 DIAGNOSIS — R06.09 DYSPNEA ON EXERTION: ICD-10-CM

## 2021-06-09 DIAGNOSIS — R05.3 CHRONIC COUGH: ICD-10-CM

## 2021-06-09 DIAGNOSIS — Z87.891 FORMER SMOKER: ICD-10-CM

## 2021-06-09 PROCEDURE — G8417 CALC BMI ABV UP PARAM F/U: HCPCS | Performed by: INTERNAL MEDICINE

## 2021-06-09 PROCEDURE — 99213 OFFICE O/P EST LOW 20 MIN: CPT | Performed by: INTERNAL MEDICINE

## 2021-06-09 PROCEDURE — 3017F COLORECTAL CA SCREEN DOC REV: CPT | Performed by: INTERNAL MEDICINE

## 2021-06-09 PROCEDURE — G8427 DOCREV CUR MEDS BY ELIG CLIN: HCPCS | Performed by: INTERNAL MEDICINE

## 2021-06-09 PROCEDURE — 1036F TOBACCO NON-USER: CPT | Performed by: INTERNAL MEDICINE

## 2021-06-09 PROCEDURE — 1123F ACP DISCUSS/DSCN MKR DOCD: CPT | Performed by: INTERNAL MEDICINE

## 2021-06-09 PROCEDURE — 4040F PNEUMOC VAC/ADMIN/RCVD: CPT | Performed by: INTERNAL MEDICINE

## 2021-06-09 RX ORDER — SODIUM CHLORIDE 9 MG/ML
25 INJECTION, SOLUTION INTRAVENOUS PRN
Status: CANCELLED | OUTPATIENT
Start: 2021-06-09

## 2021-06-09 RX ORDER — ALBUTEROL SULFATE 90 UG/1
2 AEROSOL, METERED RESPIRATORY (INHALATION) EVERY 6 HOURS PRN
Qty: 1 INHALER | Refills: 11 | Status: SHIPPED | OUTPATIENT
Start: 2021-06-09 | End: 2021-11-01 | Stop reason: ALTCHOICE

## 2021-06-09 RX ORDER — SODIUM CHLORIDE 0.9 % (FLUSH) 0.9 %
5-40 SYRINGE (ML) INJECTION EVERY 12 HOURS SCHEDULED
Status: CANCELLED | OUTPATIENT
Start: 2021-06-09

## 2021-06-09 RX ORDER — SODIUM CHLORIDE, SODIUM LACTATE, POTASSIUM CHLORIDE, CALCIUM CHLORIDE 600; 310; 30; 20 MG/100ML; MG/100ML; MG/100ML; MG/100ML
INJECTION, SOLUTION INTRAVENOUS CONTINUOUS
Status: CANCELLED | OUTPATIENT
Start: 2021-06-09

## 2021-06-09 RX ORDER — SODIUM CHLORIDE 0.9 % (FLUSH) 0.9 %
5-40 SYRINGE (ML) INJECTION PRN
Status: CANCELLED | OUTPATIENT
Start: 2021-06-09

## 2021-06-09 RX ORDER — FLUTICASONE FUROATE AND VILANTEROL TRIFENATATE 100; 25 UG/1; UG/1
1 POWDER RESPIRATORY (INHALATION) DAILY
Qty: 1 EACH | Refills: 11 | Status: SHIPPED | OUTPATIENT
Start: 2021-06-09 | End: 2021-11-01 | Stop reason: ALTCHOICE

## 2021-06-09 NOTE — PROGRESS NOTES
discussed the need to maintain yearly flu immunization, pneumococcal vaccination. We have discussed Coronavirus precaution including handwashing practice, wiping items touched in public such as gas pumps, door handles, shopping carts, etc. Self monitoring for infection - fever, chills, cough, SOB. Should they develop symptoms they should call office for further instructions. Return in about 6 months (around 12/9/2021) for Recheck for Asthma, Recheck for Shortness of Breath, Recheck for Cough. This dictation was performed with a verbal recognition program and it was checked for errors. It is possible that there are still dictated errors within this office note. Any errors should be brought immediately to my attention for correction. All efforts were made to ensure that this office note is accurate.

## 2021-06-16 NOTE — PROGRESS NOTES
6/18/21 - LM concerning  Surgery @ [facility] on 6/18/21 at [address], arrival 0715. Follow Dr. Priyanka Peoples instructions for the prep. Call the PAT Nurse for further instructions.

## 2021-06-17 ENCOUNTER — ANESTHESIA EVENT (OUTPATIENT)
Dept: ENDOSCOPY | Age: 76
End: 2021-06-17
Payer: MEDICARE

## 2021-06-17 NOTE — ANESTHESIA PRE PROCEDURE
Department of Anesthesiology  Preprocedure Note       Name:  Cait Kincaid   Age:  78 y.o.  :  1945                                          MRN:  [id number]         Date:  2021      Surgeon: Jose Jimenez):  Evelyn Marmolejo MD    Procedure: Procedure(s):  COLONOSCOPY DIAGNOSTIC    Medications prior to admission:   Prior to Admission medications    Medication Sig Start Date End Date Taking? Authorizing Provider   albuterol sulfate HFA (PROAIR HFA) 108 (90 Base) MCG/ACT inhaler Inhale 2 puffs into the lungs every 6 hours as needed for Wheezing 21   Jose Robbins MD   fluticasone-vilanterol (BREO ELLIPTA) 100-25 MCG/INH AEPB inhaler Inhale 1 puff into the lungs daily After inhalation then gargle 21   Jose Robbins MD   bisacodyl (BISACODYL) 5 MG EC tablet Take 4 tablets once for colonoscopy prep 21   MACK Josue - CNP   amLODIPine (NORVASC) 10 MG tablet Take 1 tablet by mouth daily 5/3/21   Michelle Zamudio MD   levothyroxine (SYNTHROID) 175 MCG tablet Take 1 tablet by mouth Daily (Alternate with Levothyroxine 137 mcg) 5/3/21   Michelle Zamudio MD   losartan (COZAAR) 25 MG tablet Take 1 tablet by mouth daily 5/3/21   Michelle Zamudio MD   simvastatin (ZOCOR) 20 MG tablet Take 1 tablet by mouth daily 5/3/21   Michelle Zamudio MD   fluticasone-vilanterol (BREO ELLIPTA) 100-25 MCG/INH AEPB inhaler Inhale 1 puff into the lungs daily 20   Michelle Zamudio MD   albuterol sulfate HFA (VENTOLIN HFA) 108 (90 Base) MCG/ACT inhaler Inhale 2 puffs into the lungs every 6 hours as needed for Wheezing  Patient not taking: Reported on 2021   Jose Robbins MD   Omega 3 1200 MG CAPS Take 1 capsule by mouth daily    Historical Provider, MD       Current medications:    No current facility-administered medications for this encounter.      Current Outpatient Medications   Medication Sig Dispense Refill    albuterol sulfate HFA (PROAIR HFA) 108 (90 Base) MCG/ACT inhaler hours.    Coags: No results found for: PROTIME, INR, APTT    HCG (If Applicable): No results found for: PREGTESTUR, PREGSERUM, HCG, HCGQUANT     ABGs: No results found for: PHART, PO2ART, BMY7MPO, MAF2QAI, BEART, D0RYYOBQ     Type & Screen (If Applicable):  No results found for: LABABO, LABRH    Drug/Infectious Status (If Applicable):  No results found for: HIV, HEPCAB    COVID-19 Screening (If Applicable): No results found for: COVID19        Anesthesia Evaluation  Patient summary reviewed  Airway:         Dental:          Pulmonary:   (+) COPD:  asthma:                           ROS comment: Ex smoker  Hoarseness? Cardiovascular:    (+) hypertension:,                   Neuro/Psych:               GI/Hepatic/Renal:   (+) GERD:, bowel prep,           Endo/Other:    (+) hypothyroidism::., .                 Abdominal:           Vascular:                                        Anesthesia Plan      MAC     ASA 3     ( Pre Anesthesia Assessment complete.  Chart reviewed on 6/17/2021)                            MACK King - CRNA   6/17/2021

## 2021-06-18 ENCOUNTER — ANESTHESIA (OUTPATIENT)
Dept: ENDOSCOPY | Age: 76
End: 2021-06-18
Payer: MEDICARE

## 2021-06-18 ENCOUNTER — HOSPITAL ENCOUNTER (OUTPATIENT)
Age: 76
Setting detail: OUTPATIENT SURGERY
Discharge: HOME | End: 2021-06-18
Attending: INTERNAL MEDICINE | Admitting: INTERNAL MEDICINE
Payer: MEDICARE

## 2021-06-18 VITALS — DIASTOLIC BLOOD PRESSURE: 53 MMHG | SYSTOLIC BLOOD PRESSURE: 98 MMHG | OXYGEN SATURATION: 99 %

## 2021-06-18 VITALS
RESPIRATION RATE: 16 BRPM | HEART RATE: 53 BPM | OXYGEN SATURATION: 97 % | SYSTOLIC BLOOD PRESSURE: 120 MMHG | DIASTOLIC BLOOD PRESSURE: 81 MMHG | TEMPERATURE: 96.5 F

## 2021-06-18 PROCEDURE — 3609010600 HC COLONOSCOPY POLYPECTOMY SNARE/COLD BIOPSY: Performed by: INTERNAL MEDICINE

## 2021-06-18 PROCEDURE — 2709999900 HC NON-CHARGEABLE SUPPLY: Performed by: INTERNAL MEDICINE

## 2021-06-18 PROCEDURE — 2580000003 HC RX 258: Performed by: NURSE PRACTITIONER

## 2021-06-18 PROCEDURE — 7100000011 HC PHASE II RECOVERY - ADDTL 15 MIN: Performed by: INTERNAL MEDICINE

## 2021-06-18 PROCEDURE — 7100000010 HC PHASE II RECOVERY - FIRST 15 MIN: Performed by: INTERNAL MEDICINE

## 2021-06-18 PROCEDURE — 3700000000 HC ANESTHESIA ATTENDED CARE: Performed by: INTERNAL MEDICINE

## 2021-06-18 PROCEDURE — 2500000003 HC RX 250 WO HCPCS: Performed by: NURSE ANESTHETIST, CERTIFIED REGISTERED

## 2021-06-18 PROCEDURE — 3700000001 HC ADD 15 MINUTES (ANESTHESIA): Performed by: INTERNAL MEDICINE

## 2021-06-18 PROCEDURE — 6360000002 HC RX W HCPCS: Performed by: NURSE ANESTHETIST, CERTIFIED REGISTERED

## 2021-06-18 PROCEDURE — 88305 TISSUE EXAM BY PATHOLOGIST: CPT | Performed by: PATHOLOGY

## 2021-06-18 RX ORDER — SODIUM CHLORIDE 9 MG/ML
25 INJECTION, SOLUTION INTRAVENOUS PRN
Status: DISCONTINUED | OUTPATIENT
Start: 2021-06-18 | End: 2021-06-18 | Stop reason: HOSPADM

## 2021-06-18 RX ORDER — SODIUM CHLORIDE 0.9 % (FLUSH) 0.9 %
5-40 SYRINGE (ML) INJECTION PRN
Status: DISCONTINUED | OUTPATIENT
Start: 2021-06-18 | End: 2021-06-18 | Stop reason: HOSPADM

## 2021-06-18 RX ORDER — LIDOCAINE HYDROCHLORIDE 20 MG/ML
INJECTION, SOLUTION EPIDURAL; INFILTRATION; INTRACAUDAL; PERINEURAL PRN
Status: DISCONTINUED | OUTPATIENT
Start: 2021-06-18 | End: 2021-06-18 | Stop reason: SDUPTHER

## 2021-06-18 RX ORDER — SODIUM CHLORIDE, SODIUM LACTATE, POTASSIUM CHLORIDE, CALCIUM CHLORIDE 600; 310; 30; 20 MG/100ML; MG/100ML; MG/100ML; MG/100ML
INJECTION, SOLUTION INTRAVENOUS CONTINUOUS
Status: DISCONTINUED | OUTPATIENT
Start: 2021-06-18 | End: 2021-06-18 | Stop reason: HOSPADM

## 2021-06-18 RX ORDER — PROPOFOL 10 MG/ML
INJECTION, EMULSION INTRAVENOUS PRN
Status: DISCONTINUED | OUTPATIENT
Start: 2021-06-18 | End: 2021-06-18 | Stop reason: SDUPTHER

## 2021-06-18 RX ORDER — SODIUM CHLORIDE 0.9 % (FLUSH) 0.9 %
5-40 SYRINGE (ML) INJECTION EVERY 12 HOURS SCHEDULED
Status: DISCONTINUED | OUTPATIENT
Start: 2021-06-18 | End: 2021-06-18 | Stop reason: HOSPADM

## 2021-06-18 RX ADMIN — PROPOFOL 230 MG: 10 INJECTION, EMULSION INTRAVENOUS at 09:27

## 2021-06-18 RX ADMIN — SODIUM CHLORIDE, POTASSIUM CHLORIDE, SODIUM LACTATE AND CALCIUM CHLORIDE: 600; 310; 30; 20 INJECTION, SOLUTION INTRAVENOUS at 08:16

## 2021-06-18 RX ADMIN — LIDOCAINE HYDROCHLORIDE 150 MG: 20 INJECTION, SOLUTION EPIDURAL; INFILTRATION; INTRACAUDAL; PERINEURAL at 09:27

## 2021-06-18 ASSESSMENT — PAIN SCALES - GENERAL
PAINLEVEL_OUTOF10: 0
PAINLEVEL_OUTOF10: 0

## 2021-06-18 NOTE — H&P
HISTORY & PHYSICAL:  Patient's history with special attention to the cardiovascular, pulmonary systems and the current problem was reviewed with the patient immediately prior to the procedure. Medications, allergies, and pertinent laboratory tests were also reviewed at this time. The physical examination,as below, was then performed. Patient assessed to be ASA Class 2. Mallampati Airway Assessment: 2. History of adverse reactions involving sedation/anesthesia. None  Family history of adverse reaction to sedation/anesthesia. None      PHYSICAL EXAMINATION    /78   Pulse 58   Temp 96.5 °F (35.8 °C) (Temporal)   Resp 18   SpO2 97%     Mouth and Pharynx : Normal without focal findings  Cardiac: Regular rate and rhythm  Pulmonary: Clear bilaterally  Neurological: Normal without focal findings   Abdomen: Soft, non-tender, non-distended     Written informed consent obtained from patient. Risks (including but not limited to perforation, bloating and bleeding,) benefits and alternatives explained and questions answered. Patient verbalized understanding. Based on history and airway assessment patient is an appropriate candidate for  sedation.     Satish Simpson MD  06/18/21

## 2021-06-18 NOTE — ANESTHESIA PRE PROCEDURE
mL  5-40 mL Intravenous 2 times per day Brandie Screen, APRN - CNP        sodium chloride flush 0.9 % injection 5-40 mL  5-40 mL Intravenous PRN Brandie Screen, APRN - CNP           Allergies: Allergies   Allergen Reactions    Pcn [Penicillins] Rash       Problem List:    Patient Active Problem List   Diagnosis Code    Essential hypertension I10    Hyperlipidemia E78.5    Secondary hypothyroidism E03.8    Graves' disease in remission E05.00    History of colon polyps Z86.010    Chronic cough R05    Postnasal drip R09.82    Gastroesophageal reflux disease without esophagitis K21.9    Former smoker Z87.891    Dyspnea on exertion R06.00    COPD (chronic obstructive pulmonary disease) ([address] J44.9    Mild persistent asthma without complication B40.37    Hoarseness R49.0       Past Medical History:        Diagnosis Date    Chronic cough 2019    COPD, mild ([address] 2019    Follows with Dr. Viviana Skelton    Dyspnea on exertion 2019    Essential hypertension     Follows with PCP    Former smoker 2019    Gastroesophageal reflux disease without esophagitis 2019   Terri Kiarra' disease in remission     History of colon polyps     Hyperlipidemia     Mild persistent asthma without complication 386    Postnasal drip 2019    Secondary hypothyroidism     4/3/1995- [facility]; 1995-[facility]       Past Surgical History:        Procedure Laterality Date    COLONOSCOPY  Last:     Hx: polyps     TONSILLECTOMY         Social History:    Social History     Tobacco Use    Smoking status: Former Smoker     Packs/day: 1.00     Years: 20.00     Pack years: 20.00     Types: Cigarettes     Start date: 1974     Quit date: 1994     Years since quittin.0    Smokeless tobacco: Never Used   Substance Use Topics    Alcohol use: Yes     Comment: occ.  once a week                                Counseling given: Not Answered      Vital Signs (Current):   Vitals: of anesthetic complications:   Airway: Mallampati: II  TM distance: >3 FB   Neck ROM: full  Mouth opening: > = 3 FB Dental:          Pulmonary:normal exam    (+) COPD:  asthma:                           ROS comment: Ex smoker     Cardiovascular:  Exercise tolerance: good (>4 METS),   (+) hypertension:, hyperlipidemia         Beta Blocker:  Not on Beta Blocker         Neuro/Psych:   Negative Neuro/Psych ROS              GI/Hepatic/Renal:   (+) GERD:, bowel prep,           Endo/Other:    (+) hypothyroidism::., .                 Abdominal:           Vascular: negative vascular ROS. Anesthesia Plan      MAC     ASA 3       Induction: intravenous. Anesthetic plan and risks discussed with patient. MACK Obrien CRNA   6/18/2021        Pre Anesthesia Evaluation complete. Anesthesia plan, risks, benefits, alternatives, and personnel discussed with patient and/or legal guardian. Patient and/or legal guardian verbalized an understanding and agreed to proceed. Anesthesia plan discussed with care team members and agreed upon.   MACK Obrien CRNA  6/18/2021

## 2021-06-18 NOTE — ANESTHESIA POSTPROCEDURE EVALUATION
Department of Anesthesiology  Postprocedure Note    Patient: Nicole Camarena  MRN: [id number]  YOB: 1945  Date of evaluation: 6/18/2021  Time:  9:52 AM     Procedure Summary     Date: 06/18/21 Room / Location: [address] [place] [address]    Anesthesia Start: 8993 Anesthesia Stop: 6164    Procedure: COLONOSCOPY POLYPECTOMY SNARE/COLD BIOPSY (N/A ) Diagnosis: (H/O POLYPS)    Surgeons: Quinten Alpers, MD Responsible Provider: Alfredo Osgood, MD    Anesthesia Type: MAC ASA Status: 3          Anesthesia Type: MAC    Carito Phase I:  10    Carito Phase II:  10    Last vitals: Reviewed and per EMR flowsheets.        Anesthesia Post Evaluation    Patient location during evaluation: bedside  Patient participation: complete - patient participated  Level of consciousness: awake and alert  Pain score: 0  Airway patency: patent  Nausea & Vomiting: no nausea and no vomiting  Complications: no  Cardiovascular status: hemodynamically stable  Respiratory status: acceptable, room air, spontaneous ventilation and nonlabored ventilation  Hydration status: euvolemic

## 2021-10-26 DIAGNOSIS — E05.00 GRAVES' DISEASE IN REMISSION: ICD-10-CM

## 2021-10-26 DIAGNOSIS — E78.2 MIXED HYPERLIPIDEMIA: ICD-10-CM

## 2021-10-26 LAB
A/G RATIO: 1 (ref 1.1–2.2)
ALBUMIN SERPL-MCNC: 4.4 G/DL (ref 3.4–5)
ALP BLD-CCNC: 94 U/L (ref 40–129)
ALT SERPL-CCNC: 29 U/L (ref 10–40)
ANION GAP SERPL CALCULATED.3IONS-SCNC: 13 MMOL/L (ref 3–16)
AST SERPL-CCNC: 23 U/L (ref 15–37)
BILIRUB SERPL-MCNC: 0.8 MG/DL (ref 0–1)
BUN BLDV-MCNC: 19 MG/DL (ref 7–20)
CALCIUM SERPL-MCNC: 9.8 MG/DL (ref 8.3–10.6)
CHLORIDE BLD-SCNC: 103 MMOL/L (ref 99–110)
CHOLESTEROL, TOTAL: 151 MG/DL (ref 0–199)
CO2: 25 MMOL/L (ref 21–32)
CREAT SERPL-MCNC: 1.2 MG/DL (ref 0.8–1.3)
GFR AFRICAN AMERICAN: >60
GFR NON-AFRICAN AMERICAN: 59
GLOBULIN: 4.2 G/DL
GLUCOSE BLD-MCNC: 89 MG/DL (ref 70–99)
HDLC SERPL-MCNC: 39 MG/DL (ref 40–60)
LDL CHOLESTEROL CALCULATED: 63 MG/DL
POTASSIUM SERPL-SCNC: 4.6 MMOL/L (ref 3.5–5.1)
SODIUM BLD-SCNC: 141 MMOL/L (ref 136–145)
T4 FREE: 1.2 NG/DL (ref 0.9–1.8)
TOTAL PROTEIN: 8.6 G/DL (ref 6.4–8.2)
TRIGL SERPL-MCNC: 246 MG/DL (ref 0–150)
TSH SERPL DL<=0.05 MIU/L-ACNC: 11.68 UIU/ML (ref 0.27–4.2)
VLDLC SERPL CALC-MCNC: 49 MG/DL

## 2021-11-01 ENCOUNTER — OFFICE VISIT (OUTPATIENT)
Dept: FAMILY MEDICINE CLINIC | Age: 76
End: 2021-11-01
Payer: MEDICARE

## 2021-11-01 VITALS
HEART RATE: 68 BPM | OXYGEN SATURATION: 96 % | SYSTOLIC BLOOD PRESSURE: 136 MMHG | BODY MASS INDEX: 27.36 KG/M2 | WEIGHT: 202 LBS | HEIGHT: 72 IN | DIASTOLIC BLOOD PRESSURE: 86 MMHG

## 2021-11-01 DIAGNOSIS — R06.09 DYSPNEA ON EXERTION: ICD-10-CM

## 2021-11-01 DIAGNOSIS — R53.83 OTHER FATIGUE: ICD-10-CM

## 2021-11-01 DIAGNOSIS — Z87.891 FORMER SMOKER: ICD-10-CM

## 2021-11-01 DIAGNOSIS — J42 CHRONIC BRONCHITIS, UNSPECIFIED CHRONIC BRONCHITIS TYPE (HCC): Primary | ICD-10-CM

## 2021-11-01 DIAGNOSIS — E78.2 MIXED HYPERLIPIDEMIA: ICD-10-CM

## 2021-11-01 DIAGNOSIS — E03.8 SECONDARY HYPOTHYROIDISM: ICD-10-CM

## 2021-11-01 DIAGNOSIS — R05.3 CHRONIC COUGH: ICD-10-CM

## 2021-11-01 DIAGNOSIS — I10 ESSENTIAL HYPERTENSION: ICD-10-CM

## 2021-11-01 LAB
BASOPHILS ABSOLUTE: 0.1 K/UL (ref 0–0.2)
BASOPHILS RELATIVE PERCENT: 1.1 %
EOSINOPHILS ABSOLUTE: 0.3 K/UL (ref 0–0.6)
EOSINOPHILS RELATIVE PERCENT: 3.8 %
HCT VFR BLD CALC: 50.5 % (ref 40.5–52.5)
HEMOGLOBIN: 17 G/DL (ref 13.5–17.5)
LYMPHOCYTES ABSOLUTE: 2.7 K/UL (ref 1–5.1)
LYMPHOCYTES RELATIVE PERCENT: 33.9 %
MCH RBC QN AUTO: 30.8 PG (ref 26–34)
MCHC RBC AUTO-ENTMCNC: 33.6 G/DL (ref 31–36)
MCV RBC AUTO: 91.6 FL (ref 80–100)
MONOCYTES ABSOLUTE: 0.5 K/UL (ref 0–1.3)
MONOCYTES RELATIVE PERCENT: 6.6 %
NEUTROPHILS ABSOLUTE: 4.3 K/UL (ref 1.7–7.7)
NEUTROPHILS RELATIVE PERCENT: 54.6 %
PDW BLD-RTO: 13.6 % (ref 12.4–15.4)
PLATELET # BLD: 230 K/UL (ref 135–450)
PMV BLD AUTO: 7.9 FL (ref 5–10.5)
RBC # BLD: 5.51 M/UL (ref 4.2–5.9)
WBC # BLD: 7.9 K/UL (ref 4–11)

## 2021-11-01 PROCEDURE — G0008 ADMIN INFLUENZA VIRUS VAC: HCPCS | Performed by: FAMILY MEDICINE

## 2021-11-01 PROCEDURE — 1123F ACP DISCUSS/DSCN MKR DOCD: CPT | Performed by: FAMILY MEDICINE

## 2021-11-01 PROCEDURE — G8484 FLU IMMUNIZE NO ADMIN: HCPCS | Performed by: FAMILY MEDICINE

## 2021-11-01 PROCEDURE — G8926 SPIRO NO PERF OR DOC: HCPCS | Performed by: FAMILY MEDICINE

## 2021-11-01 PROCEDURE — G8417 CALC BMI ABV UP PARAM F/U: HCPCS | Performed by: FAMILY MEDICINE

## 2021-11-01 PROCEDURE — 99214 OFFICE O/P EST MOD 30 MIN: CPT | Performed by: FAMILY MEDICINE

## 2021-11-01 PROCEDURE — 4040F PNEUMOC VAC/ADMIN/RCVD: CPT | Performed by: FAMILY MEDICINE

## 2021-11-01 PROCEDURE — 3017F COLORECTAL CA SCREEN DOC REV: CPT | Performed by: FAMILY MEDICINE

## 2021-11-01 PROCEDURE — 1036F TOBACCO NON-USER: CPT | Performed by: FAMILY MEDICINE

## 2021-11-01 PROCEDURE — 3023F SPIROM DOC REV: CPT | Performed by: FAMILY MEDICINE

## 2021-11-01 PROCEDURE — G8427 DOCREV CUR MEDS BY ELIG CLIN: HCPCS | Performed by: FAMILY MEDICINE

## 2021-11-01 PROCEDURE — 90694 VACC AIIV4 NO PRSRV 0.5ML IM: CPT | Performed by: FAMILY MEDICINE

## 2021-11-01 RX ORDER — LOSARTAN POTASSIUM 25 MG/1
25 TABLET ORAL DAILY
Qty: 90 TABLET | Refills: 1 | Status: SHIPPED | OUTPATIENT
Start: 2021-11-01 | End: 2022-06-08 | Stop reason: SDUPTHER

## 2021-11-01 RX ORDER — AMLODIPINE BESYLATE 10 MG/1
10 TABLET ORAL DAILY
Qty: 90 TABLET | Refills: 1 | Status: SHIPPED | OUTPATIENT
Start: 2021-11-01 | End: 2022-05-12 | Stop reason: SDUPTHER

## 2021-11-01 RX ORDER — SIMVASTATIN 20 MG
20 TABLET ORAL DAILY
Qty: 90 TABLET | Refills: 1 | Status: SHIPPED | OUTPATIENT
Start: 2021-11-01 | End: 2022-06-08 | Stop reason: SDUPTHER

## 2021-11-01 RX ORDER — FLUTICASONE FUROATE, UMECLIDINIUM BROMIDE AND VILANTEROL TRIFENATATE 100; 62.5; 25 UG/1; UG/1; UG/1
1 POWDER RESPIRATORY (INHALATION) DAILY
Qty: 3 EACH | Refills: 1 | Status: SHIPPED | OUTPATIENT
Start: 2021-11-01 | End: 2022-06-08 | Stop reason: ALTCHOICE

## 2021-11-01 RX ORDER — LEVOTHYROXINE SODIUM 0.15 MG/1
150 TABLET ORAL DAILY
Qty: 90 TABLET | Refills: 1 | Status: SHIPPED | OUTPATIENT
Start: 2021-11-01 | End: 2022-06-08 | Stop reason: SDUPTHER

## 2021-11-01 NOTE — PROGRESS NOTES
 High Blood Pressure Mother     High Cholesterol Mother        SOCIAL HISTORY  Social History     Socioeconomic History    Marital status:      Spouse name: None    Number of children: None    Years of education: None    Highest education level: None   Occupational History    Occupation:      Comment: Retired   Tobacco Use    Smoking status: Former Smoker     Packs/day: 1.00     Years: 20.00     Pack years: 20.00     Types: Cigarettes     Start date: 1974     Quit date: 1994     Years since quittin.4    Smokeless tobacco: Never Used   Vaping Use    Vaping Use: Never used   Substance and Sexual Activity    Alcohol use: Yes     Comment: occ. once a week    Drug use: Never    Sexual activity: None   Other Topics Concern    None   Social History Narrative    None     Social Determinants of Health     Financial Resource Strain:     Difficulty of Paying Living Expenses:    Food Insecurity:     Worried About Running Out of Food in the Last Year:     [address] in the Last Year:    Transportation Needs:     Lack of Transportation (Medical):      Lack of Transportation (Non-Medical):    Physical Activity:     Days of Exercise per Week:     Minutes of Exercise per Session:    Stress:     Feeling of Stress :    Social Connections:     Frequency of Communication with Friends and Family:     Frequency of Social Gatherings with Friends and Family:     Attends [employer] Services:     Active Member of Clubs or Organizations:     Attends Club or Organization Meetings:     Marital Status:    Intimate Partner Violence:     Fear of Current or Ex-Partner:     Emotionally Abused:     Physically Abused:     Sexually Abused:         SURGICAL HISTORY  Past Surgical History:   Procedure Laterality Date    COLONOSCOPY  Last:     Hx: polyps     COLONOSCOPY N/A 2021    COLONOSCOPY POLYPECTOMY SNARE/COLD BIOPSY performed by Remi Rider MD at [place] [address] TONSILLECTOMY  1950's       CURRENT MEDICATIONS  Current Outpatient Medications   Medication Sig Dispense Refill    simvastatin (ZOCOR) 20 MG tablet Take 1 tablet by mouth daily 90 tablet 1    losartan (COZAAR) 25 MG tablet Take 1 tablet by mouth daily 90 tablet 1    levothyroxine (SYNTHROID) 150 MCG tablet Take 1 tablet by mouth Daily (Alternate with Levothyroxine 137 mcg) 90 tablet 1    amLODIPine (NORVASC) 10 MG tablet Take 1 tablet by mouth daily 90 tablet 1    fluticasone-umeclidin-vilant (TRELEGY ELLIPTA) 100-62.5-25 MCG/INH AEPB Inhale 1 puff into the lungs daily 3 each 1    bisacodyl (BISACODYL) 5 MG EC tablet Take 4 tablets once for colonoscopy prep 4 tablet 0    Omega 3 1200 MG CAPS Take 1 capsule by mouth daily (Patient not taking: Reported on 11/1/2021)       No current facility-administered medications for this visit. ALLERGIES  Allergies   Allergen Reactions    Pcn [Penicillins] Rash       PHYSICAL EXAM    /86   Pulse 68   Ht 6' (1.829 m)   Wt 202 lb (91.6 kg)   SpO2 96%   BMI 27.40 kg/m²     Physical Exam  Vitals and nursing note reviewed. Constitutional:       Appearance: Normal appearance. Cardiovascular:      Rate and Rhythm: Normal rate. Pulmonary:      Effort: Pulmonary effort is normal.     Reviewed labs done recently  TSH was above 11 but T4 was normal  Remainder was okay except for high triglyceride      ASSESSMENT and JAGDEEP    Francoise Durna was seen today for 6 month follow-up and other. Diagnoses and all orders for this visit:    Chronic bronchitis, unspecified chronic bronchitis type ([address]    Mixed hyperlipidemia  -     simvastatin (ZOCOR) 20 MG tablet; Take 1 tablet by mouth daily  -     Lipid Panel; Future    Essential hypertension  -     amLODIPine (NORVASC) 10 MG tablet; Take 1 tablet by mouth daily  -     Comprehensive Metabolic Panel; Future    Secondary hypothyroidism  -     TSH without Reflex;  Future  -     T4, Free; Future    Other fatigue  -     CBC Auto Differential; Future    Dyspnea on exertion    Chronic cough    Former smoker    Other orders  -     losartan (COZAAR) 25 MG tablet; Take 1 tablet by mouth daily  -     levothyroxine (SYNTHROID) 150 MCG tablet; Take 1 tablet by mouth Daily (Alternate with Levothyroxine 137 mcg)  -     fluticasone-umeclidin-vilant (TRELEGY ELLIPTA) 100-62.5-25 MCG/INH AEPB; Inhale 1 puff into the lungs daily  -     INFLUENZA, QUADV, ADJUVANTED, 65 YRS =, IM, PF, PREFILL SYR, 0.5ML (FLUAD)    I will try the patient on Trelegy 100 mg size  Continue other medicines the same except increase his levothyroxine to 150 mcg  Check CBC because of his complaints of fatigue      Return in about 6 months (around 5/1/2022). Electronically signed by Kyle Alvarez MD on 11/1/2021    Please note that this chart was generated using dragon dictation software. Although every effort was made to ensure the accuracy of this automated transcription, some errors in transcription may have occurred.

## 2021-11-12 ENCOUNTER — TELEPHONE (OUTPATIENT)
Dept: FAMILY MEDICINE CLINIC | Age: 76
End: 2021-11-12

## 2021-11-12 NOTE — TELEPHONE ENCOUNTER
----- Message from Di Castillo sent at 2021 12:32 PM EST -----  Subject: Appointment Request    Reason for Call: New Patient Request Appointment    QUESTIONS  Type of Appointment? Established Patient  Reason for appointment request? Available appointments did not meet   patient need  Additional Information for Provider? Patient's wife, called because the   medicine that Dr. Trav Garcia put the patient on for COPD is not working and   they want to get an appointment with Jenny Noe as soon as possible   to try and switch over medications. Please call patient back with possible   date to come in.  ---------------------------------------------------------------------------  --------------  CALL BACK INFO  What is the best way for the office to contact you? OK to leave message on   voicemail  Preferred Call Back Phone Number? [phone]  ---------------------------------------------------------------------------  --------------  SCRIPT ANSWERS  Relationship to Patient? Other  Representative Name? Wife/M  Additional information verified (besides Name and Date of Birth)? Phone   Number  Is this the first appointment to establish care for a ? No  Have you been diagnosed with, awaiting test results for, or told that you   are suspected of having COVID-19 (Coronavirus)? (If patient has tested   negative or was tested as a requirement for work, school, or travel and   not based on symptoms, answer no)? No  Within the past two weeks have you developed any of the following symptoms   (answer no if symptoms have been present longer than 2 weeks or began   more than 2 weeks ago)? Fever or Chills, Cough, Shortness of breath or   difficulty breathing, Loss of taste or smell, Sore throat, Nasal   congestion, Sneezing or runny nose, Fatigue or generalized body aches   (answer no if pain is specific to a body part e.g. back pain), Diarrhea,   Headache?  No  Have you had close contact with someone with COVID-19 in the last 14 days? No  (Service Expert  click yes below to proceed with [employer] As Usual   Scheduling)?  Yes

## 2021-11-12 NOTE — TELEPHONE ENCOUNTER
Patient is scheduled with Dr Augustus Horvath 11-16-21 for his COPD issue. Patient is in no distress per patients wife.  If the issue would become needed patients wife will take him to the ER or to an Urgent Care

## 2021-11-16 ENCOUNTER — OFFICE VISIT (OUTPATIENT)
Dept: FAMILY MEDICINE CLINIC | Age: 76
End: 2021-11-16
Payer: MEDICARE

## 2021-11-16 VITALS
BODY MASS INDEX: 27.63 KG/M2 | OXYGEN SATURATION: 94 % | HEART RATE: 64 BPM | WEIGHT: 204 LBS | HEIGHT: 72 IN | SYSTOLIC BLOOD PRESSURE: 140 MMHG | DIASTOLIC BLOOD PRESSURE: 84 MMHG

## 2021-11-16 DIAGNOSIS — R53.1 WEAKNESS: Primary | ICD-10-CM

## 2021-11-16 DIAGNOSIS — R53.1 WEAKNESS: ICD-10-CM

## 2021-11-16 DIAGNOSIS — R06.02 SOB (SHORTNESS OF BREATH): ICD-10-CM

## 2021-11-16 LAB — PRO-BNP: 23 PG/ML (ref 0–449)

## 2021-11-16 PROCEDURE — 4040F PNEUMOC VAC/ADMIN/RCVD: CPT | Performed by: FAMILY MEDICINE

## 2021-11-16 PROCEDURE — G8427 DOCREV CUR MEDS BY ELIG CLIN: HCPCS | Performed by: FAMILY MEDICINE

## 2021-11-16 PROCEDURE — 99214 OFFICE O/P EST MOD 30 MIN: CPT | Performed by: FAMILY MEDICINE

## 2021-11-16 PROCEDURE — 3017F COLORECTAL CA SCREEN DOC REV: CPT | Performed by: FAMILY MEDICINE

## 2021-11-16 PROCEDURE — G8417 CALC BMI ABV UP PARAM F/U: HCPCS | Performed by: FAMILY MEDICINE

## 2021-11-16 PROCEDURE — 1123F ACP DISCUSS/DSCN MKR DOCD: CPT | Performed by: FAMILY MEDICINE

## 2021-11-16 PROCEDURE — 1036F TOBACCO NON-USER: CPT | Performed by: FAMILY MEDICINE

## 2021-11-16 PROCEDURE — G8484 FLU IMMUNIZE NO ADMIN: HCPCS | Performed by: FAMILY MEDICINE

## 2021-11-16 NOTE — PROGRESS NOTES
2021     [facility]      Chief Complaint   Patient presents with    Discuss Medications     trelegy not helping with COPD    Other     pt reports concerns for what is wrong with him, Kerwin Shore states pts symptoms do not diagnose him with COPD, pt states symptoms include cough, fatigue, SOB after 1 hour of work (O2 reading in the upper 80s)       HPI      Nico is a 78 y.o. male who presents today with the followin. Weakness    2.  SOB (shortness of breath)    Continues to complain of significant change in his exercise capacity  The patient cannot do his normal activities he gets exhausted he is short of breath he does not cough much he does not hear himself wheezing  I tried him on Trelegy when his last year really did not make any difference  He does not have any chest pain no ankle swelling  Sometimes he wakes up in the night short of breath and has to sit up  History of hypertension but has been under control he is on thyroid replacement  REVIEW OF SYMPTOMS    Review of Systems    PAST MEDICAL HISTORY  Past Medical History:   Diagnosis Date    Chronic cough 2019    COPD, mild ([address] 2019    Follows with Dr. Kerwin Cassidy    Dyspnea on exertion 2019    Essential hypertension     Follows with PCP    Former smoker 2019    Gastroesophageal reflux disease without esophagitis 2019   Shon Guero' disease in remission     History of colon polyps     Hyperlipidemia     Mild persistent asthma without complication 3/3/7094    Postnasal drip 2019    Secondary hypothyroidism     4/3/1995- [facility]; 1995-[facility]       FAMILY HISTORY  Family History   Problem Relation Age of Onset    Cancer Father         Bladder    Heart Disease Mother     High Blood Pressure Mother     High Cholesterol Mother        SOCIAL HISTORY  Social History     Socioeconomic History    Marital status:      Spouse name: None    Number of children: None    Years of education: None    Highest education level: None   Occupational History    Occupation:      Comment: Retired   Tobacco Use    Smoking status: Former Smoker     Packs/day: 1.00     Years: 20.00     Pack years: 20.00     Types: Cigarettes     Start date: 1974     Quit date: 1994     Years since quittin.4    Smokeless tobacco: Never Used   Vaping Use    Vaping Use: Never used   Substance and Sexual Activity    Alcohol use: Yes     Comment: occ. once a week    Drug use: Never    Sexual activity: None   Other Topics Concern    None   Social History Narrative    None     Social Determinants of Health     Financial Resource Strain:     Difficulty of Paying Living Expenses: Not on file   Food Insecurity:     Worried About Running Out of Food in the Last Year: Not on file    Kanika of Food in the Last Year: Not on file   Transportation Needs:     Lack of Transportation (Medical): Not on file    Lack of Transportation (Non-Medical):  Not on file   Physical Activity:     Days of Exercise per Week: Not on file    Minutes of Exercise per Session: Not on file   Stress:     Feeling of Stress : Not on file   Social Connections:     Frequency of Communication with Friends and Family: Not on file    Frequency of Social Gatherings with Friends and Family: Not on file    Attends [employer] Services: Not on file    Active Member of [address] [place] [address] or Organizations: Not on file    Attends Club or Organization Meetings: Not on file    Marital Status: Not on file   Intimate Partner Violence:     Fear of Current or Ex-Partner: Not on file    Emotionally Abused: Not on file    Physically Abused: Not on file    Sexually Abused: Not on file   Housing Stability:     Unable to Pay for Housing in the Last Year: Not on file    Number of [place] in the Last Year: Not on file    Unstable Housing in the Last Year: Not on file        SURGICAL HISTORY  Past Surgical History:   Procedure Laterality Date    COLONOSCOPY  Last: 2002    Hx: polyps     COLONOSCOPY N/A 6/18/2021    COLONOSCOPY POLYPECTOMY SNARE/COLD BIOPSY performed by Ariella Easton MD at [address]       CURRENT MEDICATIONS  Current Outpatient Medications   Medication Sig Dispense Refill    simvastatin (ZOCOR) 20 MG tablet Take 1 tablet by mouth daily 90 tablet 1    losartan (COZAAR) 25 MG tablet Take 1 tablet by mouth daily 90 tablet 1    levothyroxine (SYNTHROID) 150 MCG tablet Take 1 tablet by mouth Daily (Alternate with Levothyroxine 137 mcg) 90 tablet 1    amLODIPine (NORVASC) 10 MG tablet Take 1 tablet by mouth daily 90 tablet 1    fluticasone-umeclidin-vilant (TRELEGY ELLIPTA) 100-62.5-25 MCG/INH AEPB Inhale 1 puff into the lungs daily 3 each 1    bisacodyl (BISACODYL) 5 MG EC tablet Take 4 tablets once for colonoscopy prep 4 tablet 0    Omega 3 1200 MG CAPS Take 1 capsule by mouth daily (Patient not taking: Reported on 11/1/2021)       No current facility-administered medications for this visit. ALLERGIES  Allergies   Allergen Reactions    Pcn [Penicillins] Rash       PHYSICAL EXAM    BP (!) 140/84   Pulse 64   Ht 6' (1.829 m)   Wt 204 lb (92.5 kg)   SpO2 94%   BMI 27.67 kg/m²     Physical Exam  Vitals and nursing note reviewed. Constitutional:       Appearance: Normal appearance. Cardiovascular:      Rate and Rhythm: Normal rate. Pulmonary:      Effort: Pulmonary effort is normal.     Reviewed labs done including a CBC    ASSESSMENT and PLAN    Jh Carrasco was seen today for discuss medications and other. Diagnoses and all orders for this visit:    Weakness  -     ECHO Complete 2D W Doppler W Color; Future  -     BRAIN NATRIURETIC PEPTIDE (BNP); Future    SOB (shortness of breath)  -     BRAIN NATRIURETIC PEPTIDE (BNP);  Future      The patient says symptoms could be cardiac related such as valvular heart disease or cardiomyopathy  I am getting an echocardiogram and a BNP follow-up regarding the results if this is negative may still consider stress test      No follow-ups on file. Electronically signed by Ricardo Huffman MD on 11/16/2021    Please note that this chart was generated using dragon dictation software. Although every effort was made to ensure the accuracy of this automated transcription, some errors in transcription may have occurred.

## 2021-11-23 ENCOUNTER — TELEPHONE (OUTPATIENT)
Dept: FAMILY MEDICINE CLINIC | Age: 76
End: 2021-11-23

## 2021-11-23 DIAGNOSIS — R06.09 DYSPNEA ON EXERTION: Primary | ICD-10-CM

## 2021-11-23 NOTE — TELEPHONE ENCOUNTER
To Dr. Marybeth Chaidez-    The Echocardiogram will not be paid for by insurance  because it is not considered a \"Medical Necessity\" the way it is coded. Please call Antonino Cuba back to let her know if this order can be re-coded. Thank you (per patient).

## 2021-11-23 NOTE — TELEPHONE ENCOUNTER
Patients wife given number for central scheduling to follow up on scheduling.  Advised patients wife to call back with any concerns

## 2021-11-23 NOTE — TELEPHONE ENCOUNTER
Sunil Mendoza from  [address] [facility] [facility] called and stated  Echo order the DX listed is not covered.  Change the DX if possible and resubmit

## 2021-11-23 NOTE — TELEPHONE ENCOUNTER
----- Message from Mickie Alvarenga sent at 11/22/2021 11:49 AM EST -----  Subject: Referral Request    QUESTIONS   Reason for referral request? Patients was told they were getting referred   for Echocardiogram but hasn't heard anything. Has the physician seen you for this condition before? No   Preferred Specialist (if applicable)? Do you already have an appointment scheduled? Additional Information for Provider?   ---------------------------------------------------------------------------  --------------  CALL BACK INFO  What is the best way for the office to contact you? OK to leave message on   voicemail  Preferred Call Back Phone Number?  [phone]

## 2021-11-24 ENCOUNTER — HOSPITAL ENCOUNTER (OUTPATIENT)
Dept: NON INVASIVE DIAGNOSTICS | Age: 76
Discharge: HOME | End: 2021-11-24
Payer: MEDICARE

## 2021-11-24 DIAGNOSIS — R06.09 DYSPNEA ON EXERTION: ICD-10-CM

## 2021-11-24 LAB
LV EF: 55 %
LVEF MODALITY: NORMAL

## 2021-11-24 PROCEDURE — 93306 TTE W/DOPPLER COMPLETE: CPT

## 2021-12-08 ENCOUNTER — OFFICE VISIT (OUTPATIENT)
Dept: PULMONOLOGY | Age: 76
End: 2021-12-08
Payer: MEDICARE

## 2021-12-08 VITALS
WEIGHT: 200 LBS | DIASTOLIC BLOOD PRESSURE: 72 MMHG | BODY MASS INDEX: 27.09 KG/M2 | SYSTOLIC BLOOD PRESSURE: 122 MMHG | OXYGEN SATURATION: 97 % | HEIGHT: 72 IN | HEART RATE: 66 BPM

## 2021-12-08 DIAGNOSIS — R05.3 CHRONIC COUGH: ICD-10-CM

## 2021-12-08 DIAGNOSIS — J45.30 MILD PERSISTENT ASTHMA WITHOUT COMPLICATION: Primary | ICD-10-CM

## 2021-12-08 DIAGNOSIS — Z87.891 FORMER SMOKER: ICD-10-CM

## 2021-12-08 DIAGNOSIS — R09.82 POSTNASAL DRIP: ICD-10-CM

## 2021-12-08 DIAGNOSIS — R06.09 DYSPNEA ON EXERTION: ICD-10-CM

## 2021-12-08 PROCEDURE — 3017F COLORECTAL CA SCREEN DOC REV: CPT | Performed by: INTERNAL MEDICINE

## 2021-12-08 PROCEDURE — G8417 CALC BMI ABV UP PARAM F/U: HCPCS | Performed by: INTERNAL MEDICINE

## 2021-12-08 PROCEDURE — 99213 OFFICE O/P EST LOW 20 MIN: CPT | Performed by: INTERNAL MEDICINE

## 2021-12-08 PROCEDURE — 1123F ACP DISCUSS/DSCN MKR DOCD: CPT | Performed by: INTERNAL MEDICINE

## 2021-12-08 PROCEDURE — G8427 DOCREV CUR MEDS BY ELIG CLIN: HCPCS | Performed by: INTERNAL MEDICINE

## 2021-12-08 PROCEDURE — 4040F PNEUMOC VAC/ADMIN/RCVD: CPT | Performed by: INTERNAL MEDICINE

## 2021-12-08 PROCEDURE — G8484 FLU IMMUNIZE NO ADMIN: HCPCS | Performed by: INTERNAL MEDICINE

## 2021-12-08 PROCEDURE — 1036F TOBACCO NON-USER: CPT | Performed by: INTERNAL MEDICINE

## 2021-12-08 RX ORDER — FLUTICASONE FUROATE AND VILANTEROL TRIFENATATE 100; 25 UG/1; UG/1
POWDER RESPIRATORY (INHALATION)
COMMUNITY
End: 2022-06-08 | Stop reason: SDUPTHER

## 2021-12-08 NOTE — PROGRESS NOTES
SUBJECTIVE:  Chief Complaint: Mild persistent asthma, dyspnea on exertion, chronic cough, allergic rhinitis with postnasal drip, former smoker  Amanda Nobles states that he was switched from [facility] to [facility] with the hope that this would improve his cough. Unfortunately continues to cough on a daily basis and continues to have significant nasal congestion with postnasal drip. He is currently using Flonase but no longer is using a decongestant. He does continue on albuterol rescue as needed. He states that he has had no known COVID-19 exposure or infection and is received both Moderna COVID-19 vaccinations and recently received the booster COVID-19 vaccination. He also has had influenza vaccine. He has had no episodes of bronchitis or marked chest congestion and denies fever, chills, purulent sputum expectoration or hemoptysis      ROS:  Constitution:  HEENT: Negative for ear, throat pain  Cardiovascular: Negative for chest pain, syncope, edema  Pulmonary: See HPI  Musculoskeletal: Negative for DVT, myalgias, arthralgias    OBJECTIVE:  /72   Pulse 66   Ht 6' (1.829 m)   Wt 200 lb (90.7 kg)   SpO2 97%   BMI 27.12 kg/m²      Physical Exam:  Constitutional:  He appears well developed and well-nourished. Neck:  Supple, No palpable lymphadenopathy, No JVD, mild nasal congestion  Cardiovascular:  S1, S2 Normal, Regular rhythm, no murmurs or gallops, No pericardial  rubs. Pulmonary: Breath sounds are clear throughout all lung areas without wheezing or rhonchi.   He did not cough throughout the exam  Abdomen: Not examined  Extremities: no edema, No DVT  Neurologic: Oriented x3, No focal deficits    Radiology: Last chest x-ray obtained on 4/29/2019 was normal  PFT: Office spirometry on 11/10/2020 demonstrated no significant airways obstruction and no significant response to bronchodilators      Echocardiogram: 11/24/2021  Left ventricular systolic function is normal.   Ejection fraction is visually estimated at 55%.   No significant valvular disease noted. No evidence of any pericardial effusion  ASSESSMENT:    1. Mild persistent asthma without complication    2. Dyspnea on exertion    3. Chronic cough    4. Postnasal drip    5. Former smoker          PLAN:   I do not think his shortness of breath is secondary to his mild persistent asthma. I am not certain Trelegy has made any difference and did recommend he go back on Breo but for the time being he is going to stay with Trelegy. I do think he needs further intervention concerning his history of rhinitis and postnasal drip and recommended he see Dr. Tom Flood in allergy consultation. I will make no other changes in his current therapy. I will continue to follow him  We have discussed the need to maintain yearly flu immunization, pneumococcal vaccination. We have discussed Coronavirus precaution including handwashing practice, wiping items touched in public such as gas pumps, door handles, shopping carts, etc. Self monitoring for infection - fever, chills, cough, SOB. Should they develop symptoms they should call office for further instructions. Return in about 6 months (around 6/8/2022) for Recheck for Asthma, Recheck for Shortness of Breath, Recheck for Cough. This dictation was performed with a verbal recognition program and it was checked for errors. It is possible that there are still dictated errors within this office note. Any errors should be brought immediately to my attention for correction. All efforts were made to ensure that this office note is accurate.

## 2022-04-26 ENCOUNTER — TELEPHONE (OUTPATIENT)
Dept: FAMILY MEDICINE CLINIC | Age: 77
End: 2022-04-26

## 2022-04-26 NOTE — TELEPHONE ENCOUNTER
----- Message from Gavin Gramajo sent at 2022  9:43 AM EDT -----  Subject: Appointment Request    Reason for Call: New Patient Request Appointment    QUESTIONS  Type of Appointment? Established Patient  Reason for appointment request? No appointments available during search  Additional Information for Provider? Patient spouse called to schedule 6   month follow up with Dr. Tarah Harding previous patient of Dr. Gaetano Ferguson would like   office to call to schedule NP within office  ---------------------------------------------------------------------------  --------------  [address]  What is the best way for the office to contact you? OK to leave message on   voicemail  Preferred Call Back Phone Number? [phone]  ---------------------------------------------------------------------------  --------------  SCRIPT ANSWERS  Relationship to Patient? Third Party  Third Party Type? Other  Other Third Party Type? spouse  Representative Name? phil  Specialty Confirmation? Primary Care  Is this the first appointment to establish care for a ? No  Have you been diagnosed with, awaiting test results for, or told that you   are suspected of having COVID-19 (Coronavirus)? (If patient has tested   negative or was tested as a requirement for work, school, or travel and   not based on symptoms, answer no)? No  Within the past 10 days have you developed any of the following symptoms   (answer no if symptoms have been present longer than 10 days or began   more than 10 days ago)? Fever or Chills, Cough, Shortness of breath or   difficulty breathing, Loss of taste or smell, Sore throat, Nasal   congestion, Sneezing or runny nose, Fatigue or generalized body aches   (answer no if pain is specific to a body part e.g. back pain), Diarrhea,   Headache? No  Have you had close contact with someone with COVID-19 in the last 7 days? No  (Service Expert  click yes below to proceed with [employer]ual   Scheduling)?  Yes

## 2022-04-26 NOTE — TELEPHONE ENCOUNTER
----- Message from Anton Pratt sent at 4/26/2022  9:39 AM EDT -----  Subject: Appointment Request    Reason for Call: Routine Existing Condition Follow Up    QUESTIONS  Type of Appointment? Established Patient  Reason for appointment request? No appointments available during search  Additional Information for Provider? Patient spouse called to cancel   appointment for [email or web address] will be out of town  ---------------------------------------------------------------------------  --------------  [id number]  What is the best way for the office to contact you? OK to leave message on   Mister Mario  Preferred Call Back Phone Number? [phone]  ---------------------------------------------------------------------------  --------------  SCRIPT ANSWERS  Relationship to Patient? Third Party  Third Party Type? Other  Other Third Party Type? spouse  Representative Name? Carla Cardona  Have your symptoms changed? No  Is this follow up request related to routine Diabetes Management? No  Have you been diagnosed with, awaiting test results for, or told that you   are suspected of having COVID-19 (Coronavirus)? (If patient has tested   negative or was tested as a requirement for work, school, or travel and   not based on symptoms, answer no)? No  Within the past 10 days have you developed any of the following symptoms   (answer no if symptoms have been present longer than 10 days or began   more than 10 days ago)? Fever or Chills, Cough, Shortness of breath or   difficulty breathing, Loss of taste or smell, Sore throat, Nasal   congestion, Sneezing or runny nose, Fatigue or generalized body aches   (answer no if pain is specific to a body part e.g. back pain), Diarrhea,   Headache? No  Have you had close contact with someone with COVID-19 in the last 7 days? No  (Service Expert  click yes below to proceed with [employer] As Usual   Scheduling)?  Yes

## 2022-04-27 DIAGNOSIS — I10 ESSENTIAL HYPERTENSION: ICD-10-CM

## 2022-04-27 DIAGNOSIS — E78.2 MIXED HYPERLIPIDEMIA: ICD-10-CM

## 2022-04-27 DIAGNOSIS — E03.8 SECONDARY HYPOTHYROIDISM: ICD-10-CM

## 2022-04-27 LAB
A/G RATIO: 1.1 (ref 1.1–2.2)
ALBUMIN SERPL-MCNC: 4.2 G/DL (ref 3.4–5)
ALP BLD-CCNC: 92 U/L (ref 40–129)
ALT SERPL-CCNC: 23 U/L (ref 10–40)
ANION GAP SERPL CALCULATED.3IONS-SCNC: 11 MMOL/L (ref 3–16)
AST SERPL-CCNC: 18 U/L (ref 15–37)
BILIRUB SERPL-MCNC: 0.8 MG/DL (ref 0–1)
BUN BLDV-MCNC: 16 MG/DL (ref 7–20)
CALCIUM SERPL-MCNC: 9.2 MG/DL (ref 8.3–10.6)
CHLORIDE BLD-SCNC: 105 MMOL/L (ref 99–110)
CHOLESTEROL, TOTAL: 135 MG/DL (ref 0–199)
CO2: 23 MMOL/L (ref 21–32)
CREAT SERPL-MCNC: 1 MG/DL (ref 0.8–1.3)
GFR AFRICAN AMERICAN: >60
GFR NON-AFRICAN AMERICAN: >60
GLUCOSE BLD-MCNC: 88 MG/DL (ref 70–99)
HDLC SERPL-MCNC: 35 MG/DL (ref 40–60)
LDL CHOLESTEROL CALCULATED: 64 MG/DL
POTASSIUM SERPL-SCNC: 4.3 MMOL/L (ref 3.5–5.1)
SODIUM BLD-SCNC: 139 MMOL/L (ref 136–145)
T4 FREE: 1.4 NG/DL (ref 0.9–1.8)
TOTAL PROTEIN: 8.1 G/DL (ref 6.4–8.2)
TRIGL SERPL-MCNC: 182 MG/DL (ref 0–150)
TSH SERPL DL<=0.05 MIU/L-ACNC: 2.94 UIU/ML (ref 0.27–4.2)
VLDLC SERPL CALC-MCNC: 36 MG/DL

## 2022-05-12 DIAGNOSIS — I10 ESSENTIAL HYPERTENSION: ICD-10-CM

## 2022-05-12 RX ORDER — AMLODIPINE BESYLATE 10 MG/1
10 TABLET ORAL DAILY
Qty: 90 TABLET | Refills: 1 | Status: SHIPPED | OUTPATIENT
Start: 2022-05-12 | End: 2022-06-08 | Stop reason: SDUPTHER

## 2022-06-08 ENCOUNTER — OFFICE VISIT (OUTPATIENT)
Dept: FAMILY MEDICINE CLINIC | Age: 77
End: 2022-06-08
Payer: MEDICARE

## 2022-06-08 VITALS
HEART RATE: 66 BPM | RESPIRATION RATE: 16 BRPM | SYSTOLIC BLOOD PRESSURE: 138 MMHG | HEIGHT: 72 IN | BODY MASS INDEX: 27.19 KG/M2 | WEIGHT: 200.7 LBS | OXYGEN SATURATION: 96 % | DIASTOLIC BLOOD PRESSURE: 88 MMHG

## 2022-06-08 DIAGNOSIS — R09.82 POSTNASAL DRIP: ICD-10-CM

## 2022-06-08 DIAGNOSIS — E05.00 GRAVES' DISEASE IN REMISSION: ICD-10-CM

## 2022-06-08 DIAGNOSIS — I10 ESSENTIAL HYPERTENSION: ICD-10-CM

## 2022-06-08 DIAGNOSIS — R05.3 CHRONIC COUGH: ICD-10-CM

## 2022-06-08 DIAGNOSIS — J42 CHRONIC BRONCHITIS, UNSPECIFIED CHRONIC BRONCHITIS TYPE (HCC): Primary | ICD-10-CM

## 2022-06-08 DIAGNOSIS — K21.9 GASTROESOPHAGEAL REFLUX DISEASE WITHOUT ESOPHAGITIS: ICD-10-CM

## 2022-06-08 DIAGNOSIS — E78.2 MIXED HYPERLIPIDEMIA: ICD-10-CM

## 2022-06-08 DIAGNOSIS — E03.8 SECONDARY HYPOTHYROIDISM: ICD-10-CM

## 2022-06-08 DIAGNOSIS — R53.83 OTHER FATIGUE: ICD-10-CM

## 2022-06-08 DIAGNOSIS — J45.30 MILD PERSISTENT ASTHMA WITHOUT COMPLICATION: ICD-10-CM

## 2022-06-08 PROCEDURE — 3017F COLORECTAL CA SCREEN DOC REV: CPT | Performed by: STUDENT IN AN ORGANIZED HEALTH CARE EDUCATION/TRAINING PROGRAM

## 2022-06-08 PROCEDURE — 3023F SPIROM DOC REV: CPT | Performed by: STUDENT IN AN ORGANIZED HEALTH CARE EDUCATION/TRAINING PROGRAM

## 2022-06-08 PROCEDURE — G8427 DOCREV CUR MEDS BY ELIG CLIN: HCPCS | Performed by: STUDENT IN AN ORGANIZED HEALTH CARE EDUCATION/TRAINING PROGRAM

## 2022-06-08 PROCEDURE — 99214 OFFICE O/P EST MOD 30 MIN: CPT | Performed by: STUDENT IN AN ORGANIZED HEALTH CARE EDUCATION/TRAINING PROGRAM

## 2022-06-08 PROCEDURE — 1036F TOBACCO NON-USER: CPT | Performed by: STUDENT IN AN ORGANIZED HEALTH CARE EDUCATION/TRAINING PROGRAM

## 2022-06-08 PROCEDURE — G8417 CALC BMI ABV UP PARAM F/U: HCPCS | Performed by: STUDENT IN AN ORGANIZED HEALTH CARE EDUCATION/TRAINING PROGRAM

## 2022-06-08 PROCEDURE — 1123F ACP DISCUSS/DSCN MKR DOCD: CPT | Performed by: STUDENT IN AN ORGANIZED HEALTH CARE EDUCATION/TRAINING PROGRAM

## 2022-06-08 RX ORDER — SIMVASTATIN 20 MG
20 TABLET ORAL DAILY
Qty: 90 TABLET | Refills: 1 | Status: SHIPPED | OUTPATIENT
Start: 2022-06-08

## 2022-06-08 RX ORDER — FLUTICASONE FUROATE AND VILANTEROL TRIFENATATE 100; 25 UG/1; UG/1
POWDER RESPIRATORY (INHALATION)
Qty: 60 EACH | Refills: 2 | Status: SHIPPED | OUTPATIENT
Start: 2022-06-08 | End: 2022-08-08

## 2022-06-08 RX ORDER — LOSARTAN POTASSIUM 25 MG/1
25 TABLET ORAL DAILY
Qty: 90 TABLET | Refills: 1 | Status: SHIPPED | OUTPATIENT
Start: 2022-06-08

## 2022-06-08 RX ORDER — AMLODIPINE BESYLATE 10 MG/1
10 TABLET ORAL DAILY
Qty: 90 TABLET | Refills: 1 | Status: SHIPPED | OUTPATIENT
Start: 2022-06-08

## 2022-06-08 RX ORDER — LEVOTHYROXINE SODIUM 0.15 MG/1
150 TABLET ORAL DAILY
Qty: 90 TABLET | Refills: 1 | Status: SHIPPED | OUTPATIENT
Start: 2022-06-08

## 2022-06-08 ASSESSMENT — PATIENT HEALTH QUESTIONNAIRE - PHQ9
SUM OF ALL RESPONSES TO PHQ QUESTIONS 1-9: 0
2. FEELING DOWN, DEPRESSED OR HOPELESS: 0
SUM OF ALL RESPONSES TO PHQ QUESTIONS 1-9: 0
SUM OF ALL RESPONSES TO PHQ QUESTIONS 1-9: 0
SUM OF ALL RESPONSES TO PHQ9 QUESTIONS 1 & 2: 0
SUM OF ALL RESPONSES TO PHQ QUESTIONS 1-9: 0
1. LITTLE INTEREST OR PLEASURE IN DOING THINGS: 0

## 2022-06-08 ASSESSMENT — ENCOUNTER SYMPTOMS
WHEEZING: 0
ABDOMINAL PAIN: 0
NAUSEA: 0
SORE THROAT: 0
SHORTNESS OF BREATH: 0

## 2022-06-08 NOTE — PROGRESS NOTES
6/12/2022    [facility]    Chief Complaint   Patient presents with    6 Month Follow-Up     tired all the time     Established New Doctor     NTP- former Dr. Yoon Hernández Other     seems to always have phlem in his throat and have to clear it        HPI  History was obtained from patient. Ed Mcwilliams is a 78 y.o. male with a PMHx as listed below who presents today for 6 month follow up on chronic conditions. No acute complaints. Patient constantly having phlegm in throat, no specific time of occurrence. Sometimes worse  Cough has been a constant issue was resolved with breo ellipta in the past. Patient now on trelegy trial     Patient has tried allergy meidcines without benefit, patietn has tried pill medicine     Hx. Graves s/p RAIU  Repeat TSH WNL    Baseline:  Very independent    1. Chronic bronchitis, unspecified chronic bronchitis type ([facility])    2. Essential hypertension    3. Mixed hyperlipidemia    4. Mild persistent asthma without complication    5. Gastroesophageal reflux disease without esophagitis    6. Graves' disease in remission    7. Postnasal drip    8. Chronic cough    9. Secondary hypothyroidism    10. Other fatigue             REVIEW OF SYMPTOMS    Review of Systems   Constitutional: Negative for chills and fatigue. HENT: Negative for congestion and sore throat. Respiratory: Negative for shortness of breath and wheezing. Cardiovascular: Negative for chest pain and palpitations. Gastrointestinal: Negative for abdominal pain and nausea. Genitourinary: Negative for frequency and urgency. Neurological: Negative for light-headedness.        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 Fransico Garcia    Dyspnea on exertion 11/6/2019    Essential hypertension     Follows with PCP    Former smoker 11/6/2019    Gastroesophageal reflux disease without esophagitis 11/6/2019   Kale Cheeks' disease in remission     History of colon polyps     Hyperlipidemia     Mild persistent asthma without complication 5653    Postnasal drip 2019    Secondary hypothyroidism     4/3/1995- [facility]; 1995-[facility]       FAMILY HISTORY  Family History   Problem Relation Age of Onset    Cancer Father         Bladder    Heart Disease Mother     High Blood Pressure Mother     High Cholesterol Mother        SOCIAL HISTORY  Social History     Socioeconomic History    Marital status:      Spouse name: Not on file    Number of children: Not on file    Years of education: Not on file    Highest education level: Not on file   Occupational History    Occupation:      Comment: Retired   Tobacco Use    Smoking status: Former Smoker     Packs/day: 1.00     Years: 20.00     Pack years: 20.00     Types: Cigarettes     Start date: 1974     Quit date: 1994     Years since quittin.0    Smokeless tobacco: Never Used   Vaping Use    Vaping Use: Never used   Substance and Sexual Activity    Alcohol use: Yes     Comment: occ. once a week    Drug use: Never    Sexual activity: Not on file   Other Topics Concern    Not on file   Social History Narrative    Not on file     Social Determinants of Health     Financial Resource Strain:     Difficulty of Paying Living Expenses: Not on file   Food Insecurity:     Worried About Running Out of Food in the Last Year: Not on file    Kanika of Food in the Last Year: Not on file   Transportation Needs:     Lack of Transportation (Medical): Not on file    Lack of Transportation (Non-Medical):  Not on file   Physical Activity:     Days of Exercise per Week: Not on file    Minutes of Exercise per Session: Not on file   Stress:     Feeling of Stress : Not on file   Social Connections:     Frequency of Communication with Friends and Family: Not on file    Frequency of Social Gatherings with Friends and Family: Not on file    Attends [employer] Services: Not on file    Active Member of Clubs or Organizations: Not on file    Attends Club or Organization Meetings: Not on file    Marital Status: Not on file   Intimate Partner Violence:     Fear of Current or Ex-Partner: Not on file    Emotionally Abused: Not on file    Physically Abused: Not on file    Sexually Abused: Not on file   Housing Stability:     Unable to Pay for Housing in the Last Year: Not on file    Number of [place] in the Last Year: Not on file    Unstable Housing in the Last Year: Not on file        SURGICAL HISTORY  Past Surgical History:   Procedure Laterality Date    COLONOSCOPY  Last: 2002    Hx: polyps     COLONOSCOPY N/A 6/18/2021    COLONOSCOPY POLYPECTOMY SNARE/COLD BIOPSY performed by Stephan Sommer MD at [facility] [id number]                 CURRENT MEDICATIONS  Current Outpatient Medications   Medication Sig Dispense Refill    amLODIPine (NORVASC) 10 MG tablet Take 1 tablet by mouth daily 90 tablet 1    simvastatin (ZOCOR) 20 MG tablet Take 1 tablet by mouth daily 90 tablet 1    losartan (COZAAR) 25 MG tablet Take 1 tablet by mouth daily 90 tablet 1    levothyroxine (SYNTHROID) 150 MCG tablet Take 1 tablet by mouth Daily (Alternate with Levothyroxine 137 mcg) 90 tablet 1    fluticasone-vilanterol (BREO ELLIPTA) 100-25 MCG/INH AEPB inhaler inhale 1 puff by inhalation route once daily at the same time each day 60 each 2    bisacodyl (BISACODYL) 5 MG EC tablet Take 4 tablets once for colonoscopy prep (Patient not taking: Reported on 6/8/2022) 4 tablet 0    Omega 3 1200 MG CAPS Take 1 capsule by mouth daily (Patient not taking: Reported on 11/1/2021)       No current facility-administered medications for this visit.        ALLERGIES  Allergies   Allergen Reactions    Pcn [Penicillins] Rash       PHYSICAL EXAM    /88 (Site: Left Upper Arm, Position: Sitting, Cuff Size: Medium Adult)   Pulse 66   Resp 16   Ht 6' (1.829 m)   Wt 200 lb 11.2 oz (91 kg)   SpO2 96%   BMI 27.22 kg/m²     Physical Exam  Constitutional:       Appearance: Normal appearance. HENT:      Head: Normocephalic and atraumatic. Right Ear: Tympanic membrane, ear canal and external ear normal.      Left Ear: Tympanic membrane and ear canal normal.      Mouth/Throat:      Mouth: Mucous membranes are moist.   Eyes:      Extraocular Movements: Extraocular movements intact. Pupils: Pupils are equal, round, and reactive to light. Cardiovascular:      Rate and Rhythm: Normal rate and regular rhythm. Pulses: Normal pulses. Heart sounds: No murmur heard. No friction rub. No gallop. Pulmonary:      Effort: Pulmonary effort is normal.      Breath sounds: Normal breath sounds. Musculoskeletal:      Cervical back: Neck supple. Skin:     General: Skin is warm and dry. Neurological:      General: No focal deficit present. Mental Status: He is alert. Psychiatric:         Mood and Affect: Mood normal.         Behavior: Behavior normal.         ASSESSMENT & PLAN    1. Essential hypertension  bp stable on current reigmen  - amLODIPine (NORVASC) 10 MG tablet; Take 1 tablet by mouth daily  Dispense: 90 tablet; Refill: 1  - losartan (COZAAR) 25 MG tablet; Take 1 tablet by mouth daily  Dispense: 90 tablet; Refill: 1    2. Mixed hyperlipidemia  -well controlled on current regimen  - simvastatin (ZOCOR) 20 MG tablet; Take 1 tablet by mouth daily  Dispense: 90 tablet; Refill: 1    3. Chronic bronchitis, unspecified chronic bronchitis type ([address]  -change back eto breo ellipta as no change with trelegy in symptoms    4. Mild persistent asthma without complication  - fluticasone-vilanterol (BREO ELLIPTA) 100-25 MCG/INH AEPB inhaler; inhale 1 puff by inhalation route once daily at the same time each day  Dispense: 60 each; Refill: 2    5. Gastroesophageal reflux disease without esophagitis  gerd stable    6.  Graves' disease in remission  - levothyroxine (SYNTHROID) 150 MCG tablet; Take 1 tablet by mouth Daily (Alternate with Levothyroxine 137 mcg)  Dispense: 90 tablet; Refill: 1    7. Postnasal drip    8. Chronic cough    9. Secondary hypothyroidism  TSH now WNL    10. Other fatigue  -testosterone fairly nl with current regimen  - Testosterone, free, total; Future    Cough likley 2/2 post nasal drip? Former smoker if not improving consider ENT consult    Return in about 6 months (around 12/8/2022).          Electronically signed by Rosey Carbajal DO on 6/12/2022

## 2022-06-09 DIAGNOSIS — R53.83 OTHER FATIGUE: ICD-10-CM

## 2022-06-12 LAB
SEX HORMONE BINDING GLOBULIN: 52 NMOL/L (ref 11–80)
TESTOSTERONE FREE-NONMALE: 69.5 PG/ML (ref 47–244)
TESTOSTERONE TOTAL: 454 NG/DL (ref 220–1000)

## 2022-06-13 ENCOUNTER — OFFICE VISIT (OUTPATIENT)
Dept: PULMONOLOGY | Age: 77
End: 2022-06-13
Payer: MEDICARE

## 2022-06-13 VITALS
OXYGEN SATURATION: 94 % | HEIGHT: 72 IN | BODY MASS INDEX: 26.41 KG/M2 | SYSTOLIC BLOOD PRESSURE: 112 MMHG | DIASTOLIC BLOOD PRESSURE: 70 MMHG | WEIGHT: 195 LBS | HEART RATE: 69 BPM

## 2022-06-13 DIAGNOSIS — Z87.891 FORMER SMOKER: ICD-10-CM

## 2022-06-13 DIAGNOSIS — R06.09 DYSPNEA ON EXERTION: ICD-10-CM

## 2022-06-13 DIAGNOSIS — J45.30 MILD PERSISTENT ASTHMA WITHOUT COMPLICATION: Primary | ICD-10-CM

## 2022-06-13 PROCEDURE — 1123F ACP DISCUSS/DSCN MKR DOCD: CPT | Performed by: INTERNAL MEDICINE

## 2022-06-13 PROCEDURE — 1036F TOBACCO NON-USER: CPT | Performed by: INTERNAL MEDICINE

## 2022-06-13 PROCEDURE — 3017F COLORECTAL CA SCREEN DOC REV: CPT | Performed by: INTERNAL MEDICINE

## 2022-06-13 PROCEDURE — 99213 OFFICE O/P EST LOW 20 MIN: CPT | Performed by: INTERNAL MEDICINE

## 2022-06-13 PROCEDURE — G8417 CALC BMI ABV UP PARAM F/U: HCPCS | Performed by: INTERNAL MEDICINE

## 2022-06-13 PROCEDURE — G8427 DOCREV CUR MEDS BY ELIG CLIN: HCPCS | Performed by: INTERNAL MEDICINE

## 2022-06-13 NOTE — PROGRESS NOTES
SUBJECTIVE:  Chief Complaint: Mild persistent asthma, dyspnea on exertion, allergic rhinitis with postnasal drip, former smoker  Mr. Monika Briones states that he has had no recent asthmatic or bronchitic attacks. He continues on Breo daily and albuterol rescue inhaler as needed but rarely requires it. He does use Flonase for his allergic rhinitis. He has had no known COVID-19 exposure or infection and is received the booster COVID-19 vaccination. ROS:  Constitution:  HEENT: Negative for ear, throat pain  Cardiovascular: Negative for chest pain, syncope, edema  Pulmonary: See HPI  Musculoskeletal: Negative for DVT, myalgias, arthralgias    OBJECTIVE:  /70   Pulse 69   Ht 6' (1.829 m)   Wt 195 lb (88.5 kg)   SpO2 94%   BMI 26.45 kg/m²      Physical Exam:  Constitutional:  He appears well developed and well-nourished. In no respiratory distress at rest  Neck:  Supple, No palpable lymphadenopathy, No JVD  Cardiovascular:  S1, S2 Normal, Regular rhythm, no murmurs or gallops, No pericardial  rubs. Pulmonary: Breath sounds are clear throughout all areas without wheezing or rhonchi  Abdomen: Not examined  Extremities: no edema, No DVT  Neurologic: Oriented x3, No focal deficits    Radiology: No recent chest x-ray available and last chest x-ray recorded on 4/29/2019 was normal  PFT: Office spirometry on 11/10/2020 demonstrated no significant airways obstruction and no significant response to bronchodilators      Echocardiogram: 11/24/2021  Left ventricular systolic function is normal  No mention of any evidence of pulmonary hypertension    ASSESSMENT:    1. Mild persistent asthma without complication    2. Dyspnea on exertion    3. Former smoker          PLAN:   I will make no change in his current bronchodilator therapy and he is up-to-date with his inhaled bronchodilators.   Because of my anticipated [facility] later this summer I will have Dr. Aisha Stout continue to follow him for his mild asthma and renew his bronchodilators and treat any asthmatic exacerbation. If further pulmonary consultation is needed then he can be referred back to [facility] pulmonary at a later date    We have discussed the need to maintain yearly flu immunization, pneumococcal vaccination. We have discussed Coronavirus precaution including handwashing practice, wiping items touched in public such as gas pumps, door handles, shopping carts, etc. Self monitoring for infection - fever, chills, cough, SOB. Should they develop symptoms they should call office for further instructions. No follow-ups on file. This dictation was performed with a verbal recognition program and it was checked for errors. It is possible that there are still dictated errors within this office note. Any errors should be brought immediately to my attention for correction. All efforts were made to ensure that this office note is accurate.

## 2022-07-18 ENCOUNTER — HOSPITAL ENCOUNTER (EMERGENCY)
Age: 77
Discharge: HOME | End: 2022-07-18
Attending: EMERGENCY MEDICINE
Payer: MEDICARE

## 2022-07-18 ENCOUNTER — TELEPHONE (OUTPATIENT)
Dept: FAMILY MEDICINE CLINIC | Age: 77
End: 2022-07-18

## 2022-07-18 ENCOUNTER — APPOINTMENT (OUTPATIENT)
Dept: GENERAL RADIOLOGY | Age: 77
End: 2022-07-18
Payer: MEDICARE

## 2022-07-18 VITALS
RESPIRATION RATE: 20 BRPM | SYSTOLIC BLOOD PRESSURE: 101 MMHG | HEART RATE: 97 BPM | DIASTOLIC BLOOD PRESSURE: 77 MMHG | OXYGEN SATURATION: 95 %

## 2022-07-18 DIAGNOSIS — I48.91 NEW ONSET ATRIAL FIBRILLATION (HCC): Primary | ICD-10-CM

## 2022-07-18 LAB
ALBUMIN SERPL-MCNC: 3.9 GM/DL (ref 3.4–5)
ALP BLD-CCNC: 93 IU/L (ref 40–129)
ALT SERPL-CCNC: 28 U/L (ref 10–40)
ANION GAP SERPL CALCULATED.3IONS-SCNC: 11 MMOL/L (ref 4–16)
AST SERPL-CCNC: 19 IU/L (ref 15–37)
BACTERIA: NEGATIVE /HPF
BASOPHILS ABSOLUTE: 0.1 K/CU MM
BASOPHILS RELATIVE PERCENT: 1.2 % (ref 0–1)
BILIRUB SERPL-MCNC: 0.6 MG/DL (ref 0–1)
BILIRUBIN URINE: NEGATIVE MG/DL
BLOOD, URINE: NEGATIVE
BUN BLDV-MCNC: 15 MG/DL (ref 6–23)
CALCIUM SERPL-MCNC: 9.2 MG/DL (ref 8.3–10.6)
CHLORIDE BLD-SCNC: 106 MMOL/L (ref 99–110)
CLARITY: CLEAR
CO2: 22 MMOL/L (ref 21–32)
COLOR: YELLOW
CREAT SERPL-MCNC: 1.1 MG/DL (ref 0.9–1.3)
DIFFERENTIAL TYPE: ABNORMAL
EKG ATRIAL RATE: 119 BPM
EKG DIAGNOSIS: NORMAL
EKG Q-T INTERVAL: 316 MS
EKG QRS DURATION: 86 MS
EKG QTC CALCULATION (BAZETT): 423 MS
EKG R AXIS: 77 DEGREES
EKG T AXIS: 34 DEGREES
EKG VENTRICULAR RATE: 108 BPM
EOSINOPHILS ABSOLUTE: 0.5 K/CU MM
EOSINOPHILS RELATIVE PERCENT: 6.1 % (ref 0–3)
GFR AFRICAN AMERICAN: >60 ML/MIN/1.73M2
GFR NON-AFRICAN AMERICAN: >60 ML/MIN/1.73M2
GLUCOSE BLD-MCNC: 111 MG/DL (ref 70–99)
GLUCOSE, URINE: NEGATIVE MG/DL
HCT VFR BLD CALC: 49.2 % (ref 42–52)
HEMOGLOBIN: 16.8 GM/DL (ref 13.5–18)
HYALINE CASTS: 0 /LPF
IMMATURE NEUTROPHIL %: 0.4 % (ref 0–0.43)
KETONES, URINE: ABNORMAL MG/DL
LEUKOCYTE ESTERASE, URINE: NEGATIVE
LYMPHOCYTES ABSOLUTE: 2.8 K/CU MM
LYMPHOCYTES RELATIVE PERCENT: 36.6 % (ref 24–44)
MAGNESIUM: 2.5 MG/DL (ref 1.8–2.4)
MCH RBC QN AUTO: 30.7 PG (ref 27–31)
MCHC RBC AUTO-ENTMCNC: 34.1 % (ref 32–36)
MCV RBC AUTO: 89.9 FL (ref 78–100)
MONOCYTES ABSOLUTE: 0.6 K/CU MM
MONOCYTES RELATIVE PERCENT: 7.9 % (ref 0–4)
NITRITE URINE, QUANTITATIVE: NEGATIVE
NUCLEATED RBC %: 0 %
PDW BLD-RTO: 12.9 % (ref 11.7–14.9)
PH, URINE: 5.5 (ref 5–8)
PLATELET # BLD: 224 K/CU MM (ref 140–440)
PMV BLD AUTO: 9.1 FL (ref 7.5–11.1)
POTASSIUM SERPL-SCNC: 4.1 MMOL/L (ref 3.5–5.1)
PROTEIN UA: NEGATIVE MG/DL
RBC # BLD: 5.47 M/CU MM (ref 4.6–6.2)
RBC URINE: <1 /HPF (ref 0–3)
SEGMENTED NEUTROPHILS ABSOLUTE COUNT: 3.6 K/CU MM
SEGMENTED NEUTROPHILS RELATIVE PERCENT: 47.8 % (ref 36–66)
SODIUM BLD-SCNC: 139 MMOL/L (ref 135–145)
SPECIFIC GRAVITY UA: 1.02 (ref 1–1.03)
T4 FREE: 1.32 NG/DL (ref 0.9–1.8)
TOTAL IMMATURE NEUTOROPHIL: 0.03 K/CU MM
TOTAL NUCLEATED RBC: 0 K/CU MM
TOTAL PROTEIN: 7.5 GM/DL (ref 6.4–8.2)
TRICHOMONAS: ABNORMAL /HPF
TROPONIN T: <0.01 NG/ML
TSH HIGH SENSITIVITY: 1.56 UIU/ML (ref 0.27–4.2)
UROBILINOGEN, URINE: 0.2 MG/DL (ref 0.2–1)
WBC # BLD: 7.6 K/CU MM (ref 4–10.5)
WBC UA: <1 /HPF (ref 0–2)

## 2022-07-18 PROCEDURE — 84439 ASSAY OF FREE THYROXINE: CPT

## 2022-07-18 PROCEDURE — 81001 URINALYSIS AUTO W/SCOPE: CPT

## 2022-07-18 PROCEDURE — 84484 ASSAY OF TROPONIN QUANT: CPT

## 2022-07-18 PROCEDURE — 71045 X-RAY EXAM CHEST 1 VIEW: CPT

## 2022-07-18 PROCEDURE — 83735 ASSAY OF MAGNESIUM: CPT

## 2022-07-18 PROCEDURE — 80053 COMPREHEN METABOLIC PANEL: CPT

## 2022-07-18 PROCEDURE — 99285 EMERGENCY DEPT VISIT HI MDM: CPT

## 2022-07-18 PROCEDURE — 84443 ASSAY THYROID STIM HORMONE: CPT

## 2022-07-18 PROCEDURE — 85025 COMPLETE CBC W/AUTO DIFF WBC: CPT

## 2022-07-18 PROCEDURE — 93005 ELECTROCARDIOGRAM TRACING: CPT | Performed by: EMERGENCY MEDICINE

## 2022-07-18 PROCEDURE — 93010 ELECTROCARDIOGRAM REPORT: CPT | Performed by: INTERNAL MEDICINE

## 2022-07-18 NOTE — ED PROVIDER NOTES
Emergency Department Encounter    Patient: Tarsha Doty  MRN: [id number]  : 1945  Date of Evaluation: 2022  ED Provider:  Adan Georges MD    Triage Chief Complaint:   Palpitations (Palpitations since midnight)    [place]:  Tarsha Doty is a 78 y.o. male that presents with complaint of palpitations that started last night, from 70s-120s on pulse ox at home, oxygen level has been normal. This is abnormal for him to have the palpitations. No CP or SOB that is new. Has had off and on shortness of breath for a couple of years. No nausea or vomiting. Nothing makes it better. Has not happened before in the past. No acute lightheadedness or passing out. No dizziness. No focal weakness. No vomiting or diarrhea. No abdominal pain or constipation. No fevers or recent illnesses. No headache. Not a smoker. No drug use. Denies alcohol use. Has h/o Grave's disease, stable levothyroxine dose for the last year     ROS - see HPI, below listed is current ROS at time of my eval:  10 systems reviewed and negative except as above.     Past Medical History:   Diagnosis Date    Chronic cough 2019    COPD, mild ([address] 2019    Follows with Dr. Arcelia Blackwell    Dyspnea on exertion 2019    Essential hypertension     Follows with PCP    Former smoker 2019    Gastroesophageal reflux disease without esophagitis 2019    Graves' disease in remission     History of colon polyps     Hyperlipidemia     Mild persistent asthma without complication 5069    Postnasal drip 2019    Secondary hypothyroidism     4/3/1995- [facility]RY 123; 1995-H[facility]RY 131     Past Surgical History:   Procedure Laterality Date    COLONOSCOPY  Last:     Hx: polyps     COLONOSCOPY N/A 2021    COLONOSCOPY POLYPECTOMY SNARE/COLD BIOPSY performed by Emily Ramirez MD at [address]  2881'G     Family History   Problem Relation Age of Onset    Cancer Father         Bladder    Heart Disease Mother     High Blood Pressure Mother     High Cholesterol Mother      Social History     Socioeconomic History    Marital status:      Spouse name: Not on file    Number of children: Not on file    Years of education: Not on file    Highest education level: Not on file   Occupational History    Occupation:      Comment: Retired   Tobacco Use    Smoking status: Former     Packs/day: 1.00     Years: 20.00     Pack years: 20.00     Types: Cigarettes     Start date: 1974     Quit date: 1994     Years since quittin.1    Smokeless tobacco: Never   Vaping Use    Vaping Use: Never used   Substance and Sexual Activity    Alcohol use: Yes     Comment: occ. once a week    Drug use: Never    Sexual activity: Not on file   Other Topics Concern    Not on file   Social History Narrative    Not on file     Social Determinants of Health     Financial Resource Strain: Not on file   Food Insecurity: Not on file   Transportation Needs: Not on file   Physical Activity: Not on file   Stress: Not on file   Social Connections: Not on file   Intimate Partner Violence: Not on file   Housing Stability: Not on file     No current facility-administered medications for this encounter. Current Outpatient Medications   Medication Sig Dispense Refill    apixaban (ELIQUIS) 5 MG TABS tablet Take 1 tablet by mouth in the morning and 1 tablet before bedtime. 60 tablet 0    metoprolol tartrate (LOPRESSOR) 25 MG tablet Take 1 tablet by mouth in the morning and 1 tablet before bedtime.  60 tablet 0    amLODIPine (NORVASC) 10 MG tablet Take 1 tablet by mouth daily 90 tablet 1    simvastatin (ZOCOR) 20 MG tablet Take 1 tablet by mouth daily 90 tablet 1    losartan (COZAAR) 25 MG tablet Take 1 tablet by mouth daily 90 tablet 1    levothyroxine (SYNTHROID) 150 MCG tablet Take 1 tablet by mouth Daily (Alternate with Levothyroxine 137 mcg) 90 tablet 1    fluticasone-vilanterol (BREO ELLIPTA) 100-25 MCG/INH AEPB inhaler inhale 1 puff by inhalation route once daily at the same time each day 60 each 2    bisacodyl (BISACODYL) 5 MG EC tablet Take 4 tablets once for colonoscopy prep (Patient not taking: Reported on 6/8/2022) 4 tablet 0    Omega 3 1200 MG CAPS Take 1 capsule by mouth daily (Patient not taking: Reported on 11/1/2021)       Allergies   Allergen Reactions    Pcn [Penicillins] Rash       Nursing Notes Reviewed    Physical Exam:  ED Triage Vitals [07/18/22 1233]   Enc Vitals Group      /61      Heart Rate (!) 109      Resp 23      Temp       Temp src       SpO2 93 %      Weight       Height       Head Circumference       Peak Flow       Pain Score       Pain Loc       Pain Edu? Excl. in [address]? My pulse ox interpretation is - normal    General appearance:  No acute distress. Skin:  Warm. Dry. Eye:  Extraocular movements intact. Ears, nose, mouth and throat:  Oral mucosa moist   Neck:  Trachea midline. Extremity:  No swelling. Normal ROM     Heart:  irregularly irregular rhythm, rate 110s, normal S1 & S2, no extra heart sounds. Perfusion:  intact  Respiratory:  Lungs clear to auscultation bilaterally. Respirations nonlabored. Abdominal:  Soft. Nontender. Non distended.   Neurological:  Alert and oriented           Psychiatric:  Appropriate    I have reviewed and interpreted all of the currently available lab results from this visit (if applicable):  Results for orders placed or performed during the hospital encounter of 07/18/22   CBC with Auto Differential   Result Value Ref Range    WBC 7.6 4.0 - 10.5 K/CU MM    RBC 5.47 4.6 - 6.2 M/CU MM    Hemoglobin 16.8 13.5 - 18.0 GM/DL    Hematocrit 49.2 42 - 52 %    MCV 89.9 78 - 100 FL    MCH 30.7 27 - 31 PG    MCHC 34.1 32.0 - 36.0 %    RDW 12.9 11.7 - 14.9 %    Platelets 512 004 - 229 K/CU MM    MPV 9.1 7.5 - 11.1 FL    Differential Type AUTOMATED DIFFERENTIAL     Segs Relative 47.8 36 - 66 %    Lymphocytes % 36.6 24 - 44 %    Monocytes % 7.9 (H) 0 - 4 % Eosinophils % 6.1 (H) 0 - 3 %    Basophils % 1.2 (H) 0 - 1 %    Segs Absolute 3.6 K/CU MM    Lymphocytes Absolute 2.8 K/CU MM    Monocytes Absolute 0.6 K/CU MM    Eosinophils Absolute 0.5 K/CU MM    Basophils Absolute 0.1 K/CU MM    Nucleated RBC % 0.0 %    Total Nucleated RBC 0.0 K/CU MM    Total Immature Neutrophil 0.03 K/CU MM    Immature Neutrophil % 0.4 0 - 0.43 %   Comprehensive Metabolic Panel   Result Value Ref Range    Sodium 139 135 - 145 MMOL/L    Potassium 4.1 3.5 - 5.1 MMOL/L    Chloride 106 99 - 110 mMol/L    CO2 22 21 - 32 MMOL/L    BUN 15 6 - 23 MG/DL    CREATININE 1.1 0.9 - 1.3 MG/DL    Glucose 111 (H) 70 - 99 MG/DL    Calcium 9.2 8.3 - 10.6 MG/DL    Albumin 3.9 3.4 - 5.0 GM/DL    Total Protein 7.5 6.4 - 8.2 GM/DL    Total Bilirubin 0.6 0.0 - 1.0 MG/DL    ALT 28 10 - 40 U/L    AST 19 15 - 37 IU/L    Alkaline Phosphatase 93 40 - 129 IU/L    GFR Non-African American >60 >60 mL/min/1.73m2    GFR African American >60 >60 mL/min/1.73m2    Anion Gap 11 4 - 16   Troponin   Result Value Ref Range    Troponin T <0.010 <0.01 NG/ML   Urinalysis   Result Value Ref Range    Color, UA YELLOW YELLOW    Clarity, UA CLEAR CLEAR    Glucose, Urine NEGATIVE NEGATIVE MG/DL    Bilirubin Urine NEGATIVE NEGATIVE MG/DL    Ketones, Urine TRACE (A) NEGATIVE MG/DL    Specific Gravity, UA 1.025 1.001 - 1.035    Blood, Urine NEGATIVE NEGATIVE    pH, Urine 5.5 5.0 - 8.0    Protein, UA NEGATIVE NEGATIVE MG/DL    Urobilinogen, Urine 0.2 0.2 - 1.0 MG/DL    Nitrite Urine, Quantitative NEGATIVE NEGATIVE    Leukocyte Esterase, Urine NEGATIVE NEGATIVE    RBC, UA <1 0 - 3 /HPF    WBC, UA <1 0 - 2 /HPF    Bacteria, UA NEGATIVE NEGATIVE /HPF    Trichomonas, UA NONE SEEN NONE SEEN /HPF    Hyaline Casts, UA 0 /LPF   Magnesium   Result Value Ref Range    Magnesium 2.5 (H) 1.8 - 2.4 mg/dl   T4, Free   Result Value Ref Range    T4 Free 1.32 0.9 - 1.8 NG/DL   TSH   Result Value Ref Range    TSH, High Sensitivity 1.560 0.270 - 4.20 uIu/ml   EKG condition    Clinical Impression:  1. New onset atrial fibrillation (HCC)      Disposition referral (if applicable):  Mayda Slater MD  [address] [address]  [phone]    Schedule an appointment as soon as possible for a visit       Marisol Fagan DO  [address] [facility] [address]  [phone]        Disposition medications (if applicable):  Discharge Medication List as of 7/18/2022  2:15 PM        START taking these medications    Details   apixaban (ELIQUIS) 5 MG TABS tablet Take 1 tablet by mouth in the morning and 1 tablet before bedtime. , Disp-60 tablet, R-0Normal      metoprolol tartrate (LOPRESSOR) 25 MG tablet Take 1 tablet by mouth in the morning and 1 tablet before bedtime. , Disp-60 tablet, R-0Normal           ED Provider Disposition Time  DISPOSITION Decision To Discharge 07/18/2022 02:09:11 PM      Comment: Please note this report has been produced using speech recognition software and may contain errors related to that system including errors in grammar, punctuation, and spelling, as well as words and phrases that may be inappropriate. Efforts were made to edit the dictations.         Gerhardt Sawyer, MD  07/18/22 4238

## 2022-07-18 NOTE — TELEPHONE ENCOUNTER
Received Call from Nurse Triage Line- Patient having irregular rapid heartbeat. Fluctuating from 90's to 100's. And not feeling himself. No other symptoms. Patient informed to go to the ED for evaluation.

## 2022-07-21 ENCOUNTER — OFFICE VISIT (OUTPATIENT)
Dept: CARDIOLOGY CLINIC | Age: 77
End: 2022-07-21
Payer: MEDICARE

## 2022-07-21 VITALS
BODY MASS INDEX: 27.1 KG/M2 | HEART RATE: 64 BPM | WEIGHT: 199.8 LBS | RESPIRATION RATE: 20 BRPM | SYSTOLIC BLOOD PRESSURE: 108 MMHG | DIASTOLIC BLOOD PRESSURE: 76 MMHG

## 2022-07-21 DIAGNOSIS — I48.0 PAROXYSMAL ATRIAL FIBRILLATION (HCC): Primary | ICD-10-CM

## 2022-07-21 DIAGNOSIS — Z01.810 PRE-OPERATIVE CARDIOVASCULAR EXAMINATION: Primary | ICD-10-CM

## 2022-07-21 PROCEDURE — 99204 OFFICE O/P NEW MOD 45 MIN: CPT | Performed by: INTERNAL MEDICINE

## 2022-07-21 PROCEDURE — G8427 DOCREV CUR MEDS BY ELIG CLIN: HCPCS | Performed by: INTERNAL MEDICINE

## 2022-07-21 PROCEDURE — G8417 CALC BMI ABV UP PARAM F/U: HCPCS | Performed by: INTERNAL MEDICINE

## 2022-07-21 NOTE — PROGRESS NOTES
Patient was here in office & educated on DILMA/CV for Dx: Atrial Fibrillation. Procedure is scheduled for 7/28/22 @ 10:30, w/arrival @ 9:00, @ [facility]. Procedure and risks were explained to patient. Consent forms were signed. Instructions were given to patient to remain NPO after midnight the night before procedure. Patient may take morning meds the morning of procedure with small amount of water. Patient is asked to call hospital @ [phone], 1 to 2 days before procedure to pre-register. Patient was notified that procedure could be delayed due to an emergency. Patient voiced understanding.  Copies of consent, pre-testing orders, & instructions scanned into media

## 2022-07-21 NOTE — PROGRESS NOTES
CARDIOLOGY CONSULT NOTE   Reason for consultation:  afib    Referring physician:  Bibiana Minor DO    Primary care physician: Bibiana Minor DO      Dear Bibiana Minor DO  Thanks for the consult. History of present illness:Tip is a 78 y. o.year old who  presents with  Presents for palpitations and pounding for last few days, its intermittent, severe in intensity, pounding like duration is for 2 hrs, happens while sitting,not associated with shortness of breath, chest pain although has some dizziness. he was seen in ED and had afib, he was discharged  on lopressor and eliquis, still feels some palpitations. Chief Complaint   Patient presents with    Follow-Up from Hospital     Was in the hospital for afib. He states that he has not had any palpations since Tuesday      Blood pressure, cholesterol, blood glucose and weight are well controlled. Past medical history:    has a past medical history of Chronic cough, COPD, mild ([address] Dyspnea on exertion, Essential hypertension, Former smoker, Gastroesophageal reflux disease without esophagitis, Graves' disease in remission, History of colon polyps, Hyperlipidemia, Mild persistent asthma without complication, Postnasal drip, and Secondary hypothyroidism. Past surgical history:   has a past surgical history that includes Colonoscopy (Last: 2002); Tonsillectomy (1950's); and Colonoscopy (N/A, 6/18/2021). Social History:   reports that he quit smoking about 28 years ago. His smoking use included cigarettes. He started smoking about 48 years ago. He has a 20.00 pack-year smoking history. He has never used smokeless tobacco. He reports current alcohol use. He reports that he does not use drugs. Family history:   no family history of CAD, STROKE of DM    Allergies   Allergen Reactions    Pcn [Penicillins] Rash       No current facility-administered medications for this visit.     Current Outpatient Medications   Medication Sig Dispense Refill    apixaban (ELIQUIS) 5 MG TABS tablet Take 1 tablet by mouth in the morning and 1 tablet before bedtime. 60 tablet 0    metoprolol tartrate (LOPRESSOR) 25 MG tablet Take 1 tablet by mouth in the morning and 1 tablet before bedtime. 60 tablet 0    amLODIPine (NORVASC) 10 MG tablet Take 1 tablet by mouth daily 90 tablet 1    simvastatin (ZOCOR) 20 MG tablet Take 1 tablet by mouth daily 90 tablet 1    losartan (COZAAR) 25 MG tablet Take 1 tablet by mouth daily 90 tablet 1    levothyroxine (SYNTHROID) 150 MCG tablet Take 1 tablet by mouth Daily (Alternate with Levothyroxine 137 mcg) 90 tablet 1    fluticasone-vilanterol (BREO ELLIPTA) 100-25 MCG/INH AEPB inhaler inhale 1 puff by inhalation route once daily at the same time each day (Patient not taking: Reported on 7/21/2022) 60 each 2     No current facility-administered medications for this visit. Review of Systems:   Constitutional: No Fever or Weight Loss   Eyes: No Decreased Vision  ENT: No Headaches, Hearing Loss or Vertigo  Cardiovascular: No chest pain, dyspnea on exertion, palpitations or loss of consciousness  Respiratory: No cough or wheezing    Gastrointestinal: No abdominal pain, appetite loss, blood in stools, constipation, diarrhea or heartburn  Genitourinary: No dysuria, trouble voiding, or hematuria  Musculoskeletal:  No gait disturbance, weakness or joint complaints  Integumentary: No rash or pruritis  Neurological: No TIA or stroke symptoms  Psychiatric: No anxiety or depression  Endocrine: No malaise, fatigue or temperature intolerance  Hematologic/Lymphatic: No bleeding problems, blood clots or swollen lymph nodes  Allergic/Immunologic: No nasal congestion or hives  All systems negative except as marked.             Physical Examination:    Vitals:    07/21/22 1311   BP: 108/76   Pulse: 64   Resp: 20    Rr 14  afebrile  Wt Readings from Last 3 Encounters:   07/21/22 199 lb 12.8 oz (90.6 kg)   06/13/22 195 lb (88.5 kg)   06/08/22 200 lb 11.2 oz (91 kg)     Body mass index is 27.1 kg/m². General Appearance:  No distress, conversant    Constitutional:  Well developed, Well nourished, No acute distress, Non-toxic appearance. HENT:  Normocephalic, Atraumatic, Bilateral external ears normal, Oropharynx moist, No oral exudates, Nose normal. Neck- Normal range of motion, No tenderness, Supple, No stridor,no apical-carotid delay, no carotid bruit  Eyes:  PERRL, EOMI, Conjunctiva normal, No discharge. Respiratory:  Normal breath sounds, No respiratory distress, No wheezing, No chest tenderness. ,no use of accessory muscles, diaphragm movement is normal  Cardiovascular: (PMI) apex non displaced,no lifts no thrills, no s3,no s4, Normal heart rate, Normal rhythm, No murmurs, No rubs, No gallops. Carotid arteries pulse and amplitude are normal no bruit, no abdominal bruit noted ( normal abdominal aorta ausculation), femoral arteries pulse and amplitude are normal no bruit, pedal pulses are normal  GI:  Bowel sounds normal, Soft, No tenderness, No masses, No pulsatile masses, no hepatosplenomegally, no bruits  : External genitalia appear normal, No masses or lesions. No discharge. No CVA tenderness. Musculoskeletal:  Intact distal pulses, No edema, No tenderness, No cyanosis, No clubbing. Good range of motion in all major joints. No tenderness to palpation or major deformities noted. Back- No tenderness. Integument:  Warm, Dry, No erythema, No rash. Skin: no rash, no ulcers  Lymphatic:  No lymphadenopathy noted. Neurologic:  Alert & oriented x 3, Normal motor function, Normal sensory function, No focal deficits noted. Psychiatric:  Affect normal, Judgment normal, Mood normal.   Lab Review   No results for input(s): WBC, HGB, HCT, PLT in the last 72 hours. No results for input(s): NA, K, CL, CO2, PHOS, BUN, CREATININE, CA in the last 72 hours. No results for input(s): AST, ALT, ALB, BILIDIR, BILITOT, ALKPHOS in the last 72 hours.   No results for input(s): TROPONINI in the last 72 hours. No results found for: BNP  No results found for: INR, PROTIME      EKG:afib    Chest Xray:    ECHO:nromal LVEF in 2021  Labs, echo, meds reviewed  Assessment: 78 y. o.year old with PMH of  has a past medical history of Chronic cough, COPD, mild ([address] Dyspnea on exertion, Essential hypertension, Former smoker, Gastroesophageal reflux disease without esophagitis, Graves' disease in remission, History of colon polyps, Hyperlipidemia, Mild persistent asthma without complication, Postnasal drip, and Secondary hypothyroidism. Recommendations:    Afib: possibly new onset for last few days, already started on eliquis and lopressor, discussed about cardioversion procedure, patient prefers TO GET cardioversion, will get echo and stress test, CHADS vasc score is 3, will continue eliquis, will provide samples for eliquis as its 400 dollars per month for him.   COPD: stable  HTN: stable, continue norvasc 10mg and losartan  H/o grave disease and hypothyroidism now: continue Samir Roberts MD, 7/21/2022 1:35 PM

## 2022-07-22 ENCOUNTER — PROCEDURE VISIT (OUTPATIENT)
Dept: CARDIOLOGY CLINIC | Age: 77
End: 2022-07-22
Payer: MEDICARE

## 2022-07-22 DIAGNOSIS — I48.0 PAROXYSMAL ATRIAL FIBRILLATION (HCC): ICD-10-CM

## 2022-07-22 LAB
LV EF: 69 %
LVEF MODALITY: NORMAL

## 2022-07-22 PROCEDURE — 93018 CV STRESS TEST I&R ONLY: CPT | Performed by: INTERNAL MEDICINE

## 2022-07-22 PROCEDURE — A9500 TC99M SESTAMIBI: HCPCS | Performed by: INTERNAL MEDICINE

## 2022-07-22 PROCEDURE — 78452 HT MUSCLE IMAGE SPECT MULT: CPT | Performed by: INTERNAL MEDICINE

## 2022-07-22 PROCEDURE — 93017 CV STRESS TEST TRACING ONLY: CPT | Performed by: INTERNAL MEDICINE

## 2022-07-22 PROCEDURE — 93016 CV STRESS TEST SUPVJ ONLY: CPT | Performed by: INTERNAL MEDICINE

## 2022-07-28 ENCOUNTER — HOSPITAL ENCOUNTER (OUTPATIENT)
Dept: NON INVASIVE DIAGNOSTICS | Age: 77
Discharge: HOME | End: 2022-07-28
Payer: MEDICARE

## 2022-07-28 VITALS
RESPIRATION RATE: 21 BRPM | OXYGEN SATURATION: 98 % | HEART RATE: 54 BPM | SYSTOLIC BLOOD PRESSURE: 117 MMHG | DIASTOLIC BLOOD PRESSURE: 75 MMHG

## 2022-07-28 PROCEDURE — 93312 ECHO TRANSESOPHAGEAL: CPT

## 2022-07-28 PROCEDURE — 92960 CARDIOVERSION ELECTRIC EXT: CPT | Performed by: INTERNAL MEDICINE

## 2022-07-28 PROCEDURE — 93005 ELECTROCARDIOGRAM TRACING: CPT | Performed by: INTERNAL MEDICINE

## 2022-07-28 PROCEDURE — 7100000000 HC PACU RECOVERY - FIRST 15 MIN

## 2022-07-28 PROCEDURE — 7100000001 HC PACU RECOVERY - ADDTL 15 MIN

## 2022-07-28 PROCEDURE — 92960 CARDIOVERSION ELECTRIC EXT: CPT

## 2022-07-28 NOTE — PROGRESS NOTES
IV DISCONTINUED.  DISCHARGE INSTRUCTIONS DISCUSSED, PATIENT SIGNED AND COPIES TAKEN. ESCORTED TO MAIN ENTRANCE

## 2022-07-28 NOTE — PROCEDURES
SYNCHRONIZED  CARDIOVERSION     After sedation/DILMA and anesthesia, d/c synchronized cardioversion was performed with 200J. Impression: Patient is successfully cardioverted into NSR.     PLAN: continue anticoagulation

## 2022-07-29 LAB
EKG ATRIAL RATE: 187 BPM
EKG ATRIAL RATE: 53 BPM
EKG DIAGNOSIS: NORMAL
EKG DIAGNOSIS: NORMAL
EKG P AXIS: 64 DEGREES
EKG P-R INTERVAL: 196 MS
EKG Q-T INTERVAL: 384 MS
EKG Q-T INTERVAL: 432 MS
EKG QRS DURATION: 100 MS
EKG QRS DURATION: 98 MS
EKG QTC CALCULATION (BAZETT): 405 MS
EKG QTC CALCULATION (BAZETT): 420 MS
EKG R AXIS: 64 DEGREES
EKG R AXIS: 79 DEGREES
EKG T AXIS: 43 DEGREES
EKG T AXIS: 63 DEGREES
EKG VENTRICULAR RATE: 53 BPM
EKG VENTRICULAR RATE: 72 BPM

## 2022-07-29 PROCEDURE — 93010 ELECTROCARDIOGRAM REPORT: CPT | Performed by: INTERNAL MEDICINE

## 2022-08-08 ENCOUNTER — OFFICE VISIT (OUTPATIENT)
Dept: CARDIOLOGY CLINIC | Age: 77
End: 2022-08-08
Payer: MEDICARE

## 2022-08-08 VITALS
HEIGHT: 72 IN | HEART RATE: 60 BPM | SYSTOLIC BLOOD PRESSURE: 120 MMHG | BODY MASS INDEX: 27.36 KG/M2 | WEIGHT: 202 LBS | DIASTOLIC BLOOD PRESSURE: 78 MMHG

## 2022-08-08 DIAGNOSIS — I48.0 PAROXYSMAL ATRIAL FIBRILLATION (HCC): Primary | ICD-10-CM

## 2022-08-08 PROCEDURE — 1036F TOBACCO NON-USER: CPT | Performed by: INTERNAL MEDICINE

## 2022-08-08 PROCEDURE — 99214 OFFICE O/P EST MOD 30 MIN: CPT | Performed by: INTERNAL MEDICINE

## 2022-08-08 PROCEDURE — 1123F ACP DISCUSS/DSCN MKR DOCD: CPT | Performed by: INTERNAL MEDICINE

## 2022-08-08 PROCEDURE — G8417 CALC BMI ABV UP PARAM F/U: HCPCS | Performed by: INTERNAL MEDICINE

## 2022-08-08 PROCEDURE — G8428 CUR MEDS NOT DOCUMENT: HCPCS | Performed by: INTERNAL MEDICINE

## 2022-08-08 PROCEDURE — 3017F COLORECTAL CA SCREEN DOC REV: CPT | Performed by: INTERNAL MEDICINE

## 2022-08-08 NOTE — PROGRESS NOTES
Patient's wife called. Patient has an appointment on 10-28-21. Is he supposed to have labs done before appointment? There are no orders. Please call him back if he does or does not need labs.   ([address] 2019    Follows with Dr. Vivek Vizcaino    Dyspnea on exertion 2019    Essential hypertension     Follows with PCP    Former smoker 2019    Gastroesophageal reflux disease without esophagitis 2019    Graves' disease in remission     History of colon polyps     Hx of cardiovascular stress test 2022    Normal study    Hyperlipidemia     Mild persistent asthma without complication     Postnasal drip 2019    Secondary hypothyroidism     4/3/1995- [facility] [address]; 1995-[facility] [address]     Past Surgical History:   Procedure Laterality Date    COLONOSCOPY  Last:     Hx: polyps     COLONOSCOPY N/A 2021    COLONOSCOPY POLYPECTOMY SNARE/COLD BIOPSY performed by Love Patrick MD at [address] [facility]  [address]'K     Family History   Problem Relation Age of Onset    Cancer Father         Bladder    Heart Disease Mother     High Blood Pressure Mother     High Cholesterol Mother      Social History     Tobacco Use    Smoking status: Former     Packs/day: 1.00     Years: 20.00     Pack years: 20.00     Types: Cigarettes     Start date: 1974     Quit date: 1994     Years since quittin.1    Smokeless tobacco: Never   Substance Use Topics    Alcohol use: Yes     Comment: occ.  once a week      [unfilled]  Review of Systems:   Constitutional: No Fever or Weight Loss   Eyes: No Decreased Vision  ENT: No Headaches, Hearing Loss or Vertigo  Cardiovascular: No chest pain, dyspnea on exertion, palpitations or loss of consciousness  Respiratory: No cough or wheezing    Gastrointestinal: No abdominal pain, appetite loss, blood in stools, constipation, diarrhea or heartburn  Genitourinary: No dysuria, trouble voiding, or hematuria  Musculoskeletal:  No gait disturbance, weakness or joint complaints  Integumentary: No rash or pruritis  Neurological: No TIA or stroke symptoms  Psychiatric: No anxiety or depression  Endocrine: No malaise, fatigue or temperature intolerance  Hematologic/Lymphatic: No bleeding problems, blood clots or swollen lymph nodes  Allergic/Immunologic: No nasal congestion or hives  All systems negative except as marked. Objective:  /78   Pulse 60   Ht 6' (1.829 m)   Wt 202 lb (91.6 kg)   BMI 27.40 kg/m²   Wt Readings from Last 3 Encounters:   08/08/22 202 lb (91.6 kg)   07/21/22 199 lb 12.8 oz (90.6 kg)   06/13/22 195 lb (88.5 kg)     Body mass index is 27.4 kg/m². GENERAL - Alert, oriented, pleasant, in no apparent distress,normal grooming  HEENT - pupils are intact, cornea intact, conjunctive normal color, ears are normal in exam,  Neck - Supple. No jugular venous distention noted. No carotid bruits, no apical -carotid delay  Respiratory:  Normal breath sounds, No respiratory distress, No wheezing, No chest tenderness. ,no use of accessory muscles, diaphragm movement is normal  Cardiovascular: (PMI) apex non displaced,no lifts no thrills, no s3,no s4, Normal heart rate, Normal rhythm, No murmurs, No rubs, No gallops. Carotid arteries pulse and amplitude are normal no bruit, no abdominal bruit noted ( normal abdominal aorta ausculation),   Extremities - No cyanosis, clubbing, or significant edema, no varicose veins    Abdomen - No masses, tenderness, or organomegaly, no hepato-splenomegally, no bruits  Musculoskeletal - No significant edema, no kyphosis or scoliosis, no deformity in any extremity noted, muscle strength and tone are normal  Skin: no ulcer,no scar,no stasis dermatitis   Neurologic - alert oriented times 3,Cranial nerves II through XII are grossly intact. There were no gross focal neurologic abnormalities.    Psychiatric: normal mood and affect    No results found for: CKTOTAL, CKMB, CKMBINDEX, TROPONINI  BNP:  No results found for: BNP  PT/INR:  No results found for: PTINR  No results found for: LABA1C  Lab Results   Component Value Date    CHOL 135 04/27/2022    TRIG 182 (H) 04/27/2022    HDL 35 (L) 04/27/2022    1811 [employer] 64 04/27/2022     Lab Results   Component Value Date    ALT 28 07/18/2022    AST 19 07/18/2022     TSH:    Lab Results   Component Value Date/Time    TSH 2.94 04/27/2022 08:30 AM       Impression:  Junior Calloway is a 78 y. o.year old who  has a past medical history of Chronic cough, COPD, mild ([address] Dyspnea on exertion, Essential hypertension, Former smoker, Gastroesophageal reflux disease without esophagitis, Graves' disease in remission, History of colon polyps, Hx of cardiovascular stress test, Hyperlipidemia, Mild persistent asthma without complication, Postnasal drip, and Secondary hypothyroidism. and presents with     Plan:  Paroxysmal afib:s/p cardioversion,heart rate is 40-50 bpm, change lopressor to 12.5 mg bid CHADs score is 2, continue eliquis, will inquire from his insurance company if xarelto or pradaxa/savaysa is cheeper. Dyslipidemia: stable, LDL is 64,continue statins  HTN: stable, continue losartan  Hypothyroidism: stable, TSH is 1.5,continue synthroid  Hyperthyroidism: s/p radiation, now has hypothyroidism  Health maintenance: exerise and diet  All labs, medications and tests reviewed, continue all other medications of all above medical condition listed as is.

## 2022-08-08 NOTE — PROGRESS NOTES
[id number] Score for Atrial Fibrillation Stroke Risk   Risk   Factors  Component Value   C CHF No 0   H HTN Yes 1   A2 Age >= 76 Yes,  (71 y.o.) 2   D DM No 0   S2 Prior Stroke/TIA No 0   V Vascular Disease No 0   A Age 74-69 No,  (71 y.o.) 0   Sc Sex male 0    [id number]  Score  3   Score last updated 8/8/22 8:14 AM EDT    Click here for a link to the UpToDate guideline \"Atrial Fibrillation: Anticoagulation therapy to prevent embolization    Disclaimer: Risk Score calculation is dependent on accuracy of patient problem list and past encounter diagnosis. no abrasions, no jaundice, no lesions, no pruritis, and no rashes.

## 2022-08-15 ENCOUNTER — TELEPHONE (OUTPATIENT)
Dept: CARDIOLOGY CLINIC | Age: 77
End: 2022-08-15

## 2022-08-15 NOTE — TELEPHONE ENCOUNTER
Patient reduced metoprolol to 12.5mg at 8/8 OV. Patient states HR 50-55, B/P 105/63. Patient has no energy.

## 2022-08-15 NOTE — TELEPHONE ENCOUNTER
Pt states his heart rate is still low with cutting his dose in half. Reports it runs 50-55 and he has no energy.  Please advise

## 2022-11-07 ENCOUNTER — OFFICE VISIT (OUTPATIENT)
Dept: CARDIOLOGY CLINIC | Age: 77
End: 2022-11-07
Payer: MEDICARE

## 2022-11-07 VITALS
HEART RATE: 76 BPM | HEIGHT: 72 IN | DIASTOLIC BLOOD PRESSURE: 70 MMHG | WEIGHT: 205 LBS | BODY MASS INDEX: 27.77 KG/M2 | SYSTOLIC BLOOD PRESSURE: 122 MMHG

## 2022-11-07 DIAGNOSIS — E78.2 MIXED HYPERLIPIDEMIA: ICD-10-CM

## 2022-11-07 DIAGNOSIS — I48.0 PAF (PAROXYSMAL ATRIAL FIBRILLATION) (HCC): Primary | ICD-10-CM

## 2022-11-07 DIAGNOSIS — I10 ESSENTIAL HYPERTENSION: ICD-10-CM

## 2022-11-07 PROCEDURE — 1123F ACP DISCUSS/DSCN MKR DOCD: CPT | Performed by: NURSE PRACTITIONER

## 2022-11-07 PROCEDURE — 3078F DIAST BP <80 MM HG: CPT | Performed by: NURSE PRACTITIONER

## 2022-11-07 PROCEDURE — G8427 DOCREV CUR MEDS BY ELIG CLIN: HCPCS | Performed by: NURSE PRACTITIONER

## 2022-11-07 PROCEDURE — 99214 OFFICE O/P EST MOD 30 MIN: CPT | Performed by: NURSE PRACTITIONER

## 2022-11-07 PROCEDURE — 3017F COLORECTAL CA SCREEN DOC REV: CPT | Performed by: NURSE PRACTITIONER

## 2022-11-07 PROCEDURE — G8484 FLU IMMUNIZE NO ADMIN: HCPCS | Performed by: NURSE PRACTITIONER

## 2022-11-07 PROCEDURE — 1036F TOBACCO NON-USER: CPT | Performed by: NURSE PRACTITIONER

## 2022-11-07 PROCEDURE — 3074F SYST BP LT 130 MM HG: CPT | Performed by: NURSE PRACTITIONER

## 2022-11-07 PROCEDURE — G8417 CALC BMI ABV UP PARAM F/U: HCPCS | Performed by: NURSE PRACTITIONER

## 2022-11-07 RX ORDER — AMLODIPINE BESYLATE 10 MG/1
10 TABLET ORAL DAILY
Qty: 90 TABLET | Refills: 1 | Status: SHIPPED | OUTPATIENT
Start: 2022-11-07

## 2022-11-07 RX ORDER — DIPHENOXYLATE HYDROCHLORIDE AND ATROPINE SULFATE 2.5; .025 MG/1; MG/1
TABLET ORAL
COMMUNITY

## 2022-11-07 ASSESSMENT — ENCOUNTER SYMPTOMS: SHORTNESS OF BREATH: 0

## 2022-11-07 NOTE — ASSESSMENT & PLAN NOTE
-DILMA/DCCV 7/28/2022 which successfully cardioverted him to sinus rhythm. Chads-Vas is 3, continue with Eliquis 5 mg twice daily for anticoagulation. Patient had complained of fatigue and heart rate in 50s, Lopressor was DC'd.

## 2022-11-07 NOTE — PROGRESS NOTES
[address], [address] [facility],Third Floor  Phone: [phone]    Fax [phone]                  Sruthi Corado MD, Marley Damico MD, Sybil Ricks MD, MD Ginger Clark MD, Marina Desai MD, Janis Luu MD, [address] [place] [address]        Cardiology Progress Note      11/7/2022    RE: Jeanette Tobar  ([birth year])                             Primary cardiologist: Dr. Ginger Falk       Subjective:  CC:   1. PAF (paroxysmal atrial fibrillation) ([facility] [address]    2. Essential hypertension    3. Mixed hyperlipidemia        HPI: Jeanette Tobar, who is a  78y.o. year old male with a past medical history as listed below. Patient presents to the office for follow up on PAF, HTN, and hyperlipidemia. Patient had DILMA/DCCV 7/28/2022 which successfully cardioverted him to sinus rhythm. Patient had difficulty with bradycardia and Lopressor was D/C. Patient denies any chest pain, shortness of breath, dizziness, syncope, or palpitations.     Past Medical History:   Diagnosis Date    Chronic cough 11/06/2019    COPD, mild ([facility] [address] 12/09/2019    Follows with Dr. Roma Sin    Dyspnea on exertion 11/06/2019    Essential hypertension     Follows with PCP    Former smoker 11/06/2019    Gastroesophageal reflux disease without esophagitis 11/06/2019    Graves' disease in remission     History of colon polyps     Hx of cardiovascular stress test 07/22/2022    Normal study    Hyperlipidemia     Mild persistent asthma without complication 92/26/0642    Postnasal drip 11/06/2019    Secondary hypothyroidism     4/3/1995- [facility]; 4/7/1995-[facility] 131       Current Outpatient Medications   Medication Sig Dispense Refill    apixaban (ELIQUIS) 5 MG TABS tablet Take 1 tablet by mouth 2 times daily 180 tablet 3    metoprolol tartrate (LOPRESSOR) 25 MG tablet Take 0.5 tablets by mouth in the morning and 0.5 tablets before bedtime. (Patient not taking: Reported on 8/15/2022) 60 tablet 0    apixaban (ELIQUIS) 5 MG TABS tablet Take 1 tablet by mouth in the morning and 1 tablet before bedtime. 28 tablet 0    amLODIPine (NORVASC) 10 MG tablet Take 1 tablet by mouth daily 90 tablet 1    simvastatin (ZOCOR) 20 MG tablet Take 1 tablet by mouth daily 90 tablet 1    losartan (COZAAR) 25 MG tablet Take 1 tablet by mouth daily 90 tablet 1    levothyroxine (SYNTHROID) 150 MCG tablet Take 1 tablet by mouth Daily (Alternate with Levothyroxine 137 mcg) 90 tablet 1    fluticasone-vilanterol (BREO ELLIPTA) 100-25 MCG/INH AEPB inhaler inhale 1 puff by inhalation route once daily at the same time each day (Patient not taking: No sig reported) 60 each 2     No current facility-administered medications for this visit. Review of Systems:  Review of Systems   Respiratory:  Negative for shortness of breath. Cardiovascular:  Negative for chest pain, palpitations and leg swelling. Musculoskeletal: Negative. Skin: Negative. Neurological:  Negative for dizziness and weakness. All other systems reviewed and are negative. Objective:      Physical Exam:  There were no vitals taken for this visit. Wt Readings from Last 3 Encounters:   08/08/22 202 lb (91.6 kg)   07/21/22 199 lb 12.8 oz (90.6 kg)   06/13/22 195 lb (88.5 kg)     There is no height or weight on file to calculate BMI. Physical exam:  Physical Exam  Constitutional:       Appearance: He is well-developed. Cardiovascular:      Rate and Rhythm: Normal rate and regular rhythm. Pulses: Intact distal pulses. Dorsalis pedis pulses are 2+ on the right side and 2+ on the left side. Posterior tibial pulses are 2+ on the right side and 2+ on the left side. Heart sounds: Normal heart sounds, S1 normal and S2 normal.   Pulmonary:      Effort: Pulmonary effort is normal.      Breath sounds: Normal breath sounds. Musculoskeletal:         General: Normal range of motion. Skin:     General: Skin is warm and dry. Neurological:      Mental Status: He is alert and oriented to person, place, and time. DATA:  No results found for: CKTOTAL, CKMB, CKMBINDEX, TROPONINI  BNP:  No results found for: BNP  PT/INR:  No results found for: PTINR  No results found for: LABA1C  Lab Results   Component Value Date    CHOL 135 04/27/2022    TRIG 182 (H) 04/27/2022    HDL 35 (L) 04/27/2022    LDLCALC 64 04/27/2022     Lab Results   Component Value Date    ALT 28 07/18/2022    AST 19 07/18/2022     TSH:    Lab Results   Component Value Date/Time    TSH 2.94 04/27/2022 08:30 AM       There were no vitals filed for this visit. Echo:11/24/21  Left ventricular systolic function is normal.   Ejection fraction is visually estimated at 55%. No significant valvular disease noted. No evidence of any pericardial effusion. Stress Test:7/22/22  No ischemia       The 10-year ASCVD risk score (Cheikh DK, et al., 2019) is: 25.4%    Values used to calculate the score:      Age: 78 years      Sex: Male      Is Non- : No      Diabetic: No      Tobacco smoker: No      Systolic Blood Pressure: 023 mmHg      Is BP treated: Yes      HDL Cholesterol: 35 mg/dL      Total Cholesterol: 135 mg/dL      Assessment/ Plan:     PAF (paroxysmal atrial fibrillation) ([facility] [address]   -DILMA/DCCV 7/28/2022 which successfully cardioverted him to sinus rhythm. Chads-Vas is 3, continue with Eliquis 5 mg twice daily for anticoagulation. Patient had complained of fatigue and heart rate in 50s, Lopressor was DC'd. Cost for eliquis is high, will see if any other medications are an option. Risks of stroke discussed with patient and the need to continue with anticoagulation. Patient believes his risk is low given high level of stress at the time of atrial fibrillation onset and does not believe it will happen again.     Hyperlipidemia   -At or near goal Yes    -He is to continue current medications (Zocor 20 mg) Hepatic function panel WNL. No abdominal pain. No myalgias.     -The nature of cardiac risk has been fully discussed with this patient. I have made him aware of his LDL target goal given his cardiovascular risk analysis. I have discussed the appropriate diet. The need for lifelong compliance in order to reduce risk is stressed. A regular exercise program is recommended to help achieve and maintain normal body weight, fitness and improve lipid balance. A written copy of a low fat, low cholesterol diet has been given to the patient. Essential hypertension   - Stable continue with Norvasc 10 mg daily and losartan 25 mg daily. Patient seen, interviewed and examined. Testing was reviewed. Patient is encouraged to exercise even a brisk walk for 30 minutes at least 3 to 4 times a week. Lifestyle and risk factor modificatons discussed. Various goals are discussed and questions answered. Continue current medications. Appropriate prescriptions are addressed. Questions answered and patient verbalizes understanding. Call for any problems, questions, or concerns. Pt is to follow up in 6 months for Cardiac management    Electronically signed by Kelley Szymanski.  MACK Talamantes CNP on 11/7/2022 at 8:23 AM

## 2022-11-07 NOTE — ASSESSMENT & PLAN NOTE
-At or near goal Yes    -He is to continue current medications (Zocor 20 mg) Hepatic function panel WNL. No abdominal pain. No myalgias.     -The nature of cardiac risk has been fully discussed with this patient. I have made him aware of his LDL target goal given his cardiovascular risk analysis. I have discussed the appropriate diet. The need for lifelong compliance in order to reduce risk is stressed. A regular exercise program is recommended to help achieve and maintain normal body weight, fitness and improve lipid balance. A written copy of a low fat, low cholesterol diet has been given to the patient.

## 2022-11-07 NOTE — PATIENT INSTRUCTIONS
Please be informed that if you contact our office outside of normal business hours the physician on call cannot help with any scheduling or rescheduling issues, procedure instruction questions or any type of medication issue. We advise you for any urgent/emergency that you go to the nearest emergency room! PLEASE CALL OUR OFFICE DURING NORMAL BUSINESS HOURS    Monday - Friday   8 am to 5 pm    [place] [address]: [phone]    [place]:  [phone]  **It is YOUR responsibilty to bring medication bottles and/or updated medication list to [address] [place] [address]. This will allow us to better serve you and all your healthcare needs**  [facility] Locations - No appointment necessary. Sites open Monday to Friday. Call your preferred location for test preparation, business hours and other information you need. [employer] accepts [employer]. [facility] [address] Julieta Castroyana, [address] [facility]  Phone: [phone] [facility] [address] [facility]  [address]. Mil[place]ros sparks, [address] [facility]  Phone: [phone]     Thank you for allowing us to care for you today! We want to ensure we can follow your treatment plan and we strive to give you the best outcomes and experience possible. If you ever have a life threatening emergency and call 911 - for an ambulance (EMS)   Our providers can only care for you at:   [facility] or [facility]. Even if you have someone take you or you drive yourself we can only care for you in a [facility] facility. Our providers are not setup at the other healthcare locations!

## 2022-11-07 NOTE — PROGRESS NOTES
Vein \"LEG PROBLEM Questionnaire\"  Do you have prominent leg veins? No   Do you have any skin discoloration? No  Do you have any healed or active sores? No  Do you have swelling of the legs? No  Do you have a family history of varicose veins? Yes  Does your profession involve pro-longed        standing or heavy lifting? No  7. Have you been fighting overweight problems? No  8. Do you have restless legs? No  9. Do you have any night time cramps? No  10. Do you have any of the following in your legs:         I  11. If Yes - Have they worn compression stockings No, but wears tight socks according to the patient  12.  If they have worn compression stockings

## 2022-12-05 DIAGNOSIS — E78.2 MIXED HYPERLIPIDEMIA: ICD-10-CM

## 2022-12-05 DIAGNOSIS — I10 ESSENTIAL HYPERTENSION: ICD-10-CM

## 2022-12-05 DIAGNOSIS — E78.2 MIXED HYPERLIPIDEMIA: Primary | ICD-10-CM

## 2022-12-05 DIAGNOSIS — E03.8 SECONDARY HYPOTHYROIDISM: ICD-10-CM

## 2022-12-05 LAB
A/G RATIO: 1 (ref 1.1–2.2)
ALBUMIN SERPL-MCNC: 4.3 G/DL (ref 3.4–5)
ALP BLD-CCNC: 91 U/L (ref 40–129)
ALT SERPL-CCNC: 26 U/L (ref 10–40)
ANION GAP SERPL CALCULATED.3IONS-SCNC: 16 MMOL/L (ref 3–16)
AST SERPL-CCNC: 20 U/L (ref 15–37)
BILIRUB SERPL-MCNC: 0.5 MG/DL (ref 0–1)
BUN BLDV-MCNC: 15 MG/DL (ref 7–20)
CALCIUM SERPL-MCNC: 9.5 MG/DL (ref 8.3–10.6)
CHLORIDE BLD-SCNC: 104 MMOL/L (ref 99–110)
CHOLESTEROL, FASTING: 159 MG/DL (ref 0–199)
CO2: 24 MMOL/L (ref 21–32)
CREAT SERPL-MCNC: 1 MG/DL (ref 0.8–1.3)
GFR SERPL CREATININE-BSD FRML MDRD: >60 ML/MIN/{1.73_M2}
GLUCOSE BLD-MCNC: 86 MG/DL (ref 70–99)
HDLC SERPL-MCNC: 42 MG/DL (ref 40–60)
LDL CHOLESTEROL CALCULATED: 84 MG/DL
POTASSIUM SERPL-SCNC: 4.7 MMOL/L (ref 3.5–5.1)
SODIUM BLD-SCNC: 144 MMOL/L (ref 136–145)
TOTAL PROTEIN: 8.5 G/DL (ref 6.4–8.2)
TRIGLYCERIDE, FASTING: 165 MG/DL (ref 0–150)
TSH REFLEX FT4: 3.17 UIU/ML (ref 0.27–4.2)
VLDLC SERPL CALC-MCNC: 33 MG/DL

## 2022-12-08 ENCOUNTER — OFFICE VISIT (OUTPATIENT)
Dept: FAMILY MEDICINE CLINIC | Age: 77
End: 2022-12-08
Payer: MEDICARE

## 2022-12-08 VITALS
DIASTOLIC BLOOD PRESSURE: 82 MMHG | BODY MASS INDEX: 27.22 KG/M2 | HEIGHT: 72 IN | WEIGHT: 201 LBS | HEART RATE: 71 BPM | OXYGEN SATURATION: 97 % | SYSTOLIC BLOOD PRESSURE: 114 MMHG

## 2022-12-08 DIAGNOSIS — J45.20 MILD INTERMITTENT ASTHMA WITHOUT COMPLICATION: Primary | ICD-10-CM

## 2022-12-08 DIAGNOSIS — I10 ESSENTIAL HYPERTENSION: ICD-10-CM

## 2022-12-08 DIAGNOSIS — K21.9 GASTROESOPHAGEAL REFLUX DISEASE WITHOUT ESOPHAGITIS: Primary | ICD-10-CM

## 2022-12-08 DIAGNOSIS — E05.00 GRAVES' DISEASE IN REMISSION: ICD-10-CM

## 2022-12-08 DIAGNOSIS — E78.2 MIXED HYPERLIPIDEMIA: ICD-10-CM

## 2022-12-08 PROCEDURE — 99214 OFFICE O/P EST MOD 30 MIN: CPT | Performed by: STUDENT IN AN ORGANIZED HEALTH CARE EDUCATION/TRAINING PROGRAM

## 2022-12-08 PROCEDURE — G8427 DOCREV CUR MEDS BY ELIG CLIN: HCPCS | Performed by: STUDENT IN AN ORGANIZED HEALTH CARE EDUCATION/TRAINING PROGRAM

## 2022-12-08 PROCEDURE — G8417 CALC BMI ABV UP PARAM F/U: HCPCS | Performed by: STUDENT IN AN ORGANIZED HEALTH CARE EDUCATION/TRAINING PROGRAM

## 2022-12-08 PROCEDURE — 3017F COLORECTAL CA SCREEN DOC REV: CPT | Performed by: STUDENT IN AN ORGANIZED HEALTH CARE EDUCATION/TRAINING PROGRAM

## 2022-12-08 PROCEDURE — 3078F DIAST BP <80 MM HG: CPT | Performed by: STUDENT IN AN ORGANIZED HEALTH CARE EDUCATION/TRAINING PROGRAM

## 2022-12-08 PROCEDURE — 3074F SYST BP LT 130 MM HG: CPT | Performed by: STUDENT IN AN ORGANIZED HEALTH CARE EDUCATION/TRAINING PROGRAM

## 2022-12-08 PROCEDURE — G8484 FLU IMMUNIZE NO ADMIN: HCPCS | Performed by: STUDENT IN AN ORGANIZED HEALTH CARE EDUCATION/TRAINING PROGRAM

## 2022-12-08 PROCEDURE — 1036F TOBACCO NON-USER: CPT | Performed by: STUDENT IN AN ORGANIZED HEALTH CARE EDUCATION/TRAINING PROGRAM

## 2022-12-08 PROCEDURE — 1123F ACP DISCUSS/DSCN MKR DOCD: CPT | Performed by: STUDENT IN AN ORGANIZED HEALTH CARE EDUCATION/TRAINING PROGRAM

## 2022-12-08 RX ORDER — AMLODIPINE BESYLATE 10 MG/1
10 TABLET ORAL DAILY
Qty: 90 TABLET | Refills: 1 | Status: SHIPPED | OUTPATIENT
Start: 2022-12-08

## 2022-12-08 RX ORDER — LEVOTHYROXINE SODIUM 0.15 MG/1
150 TABLET ORAL DAILY
Qty: 90 TABLET | Refills: 1 | Status: SHIPPED | OUTPATIENT
Start: 2022-12-08

## 2022-12-08 RX ORDER — BENZONATATE 200 MG/1
CAPSULE ORAL
COMMUNITY
Start: 2022-11-28

## 2022-12-08 RX ORDER — LOSARTAN POTASSIUM 25 MG/1
25 TABLET ORAL DAILY
Qty: 90 TABLET | Refills: 1 | Status: SHIPPED | OUTPATIENT
Start: 2022-12-08

## 2022-12-08 RX ORDER — SIMVASTATIN 20 MG
20 TABLET ORAL DAILY
Qty: 90 TABLET | Refills: 1 | Status: SHIPPED | OUTPATIENT
Start: 2022-12-08

## 2022-12-08 RX ORDER — FAMOTIDINE 20 MG/1
20 TABLET, FILM COATED ORAL 2 TIMES DAILY
Qty: 60 TABLET | Refills: 0 | Status: SHIPPED | OUTPATIENT
Start: 2022-12-08

## 2022-12-08 ASSESSMENT — ENCOUNTER SYMPTOMS
ABDOMINAL PAIN: 0
WHEEZING: 0
SORE THROAT: 0
SHORTNESS OF BREATH: 0
NAUSEA: 0

## 2022-12-08 NOTE — PROGRESS NOTES
12/8/2022    Ailyn Flores    Chief Complaint   Patient presents with    6 Month Follow-Up     Pt reports no conerns       HPI  History was obtained from patient. Kirt Fuchs is a 78 y.o. male with a PMHx as listed below who presents today for 6 month follow up on chronic conditions. No acute complaints. Patient recently had Viral URI  COPD stable, patient has stopped breo ellipta    Recent labs excellent    1. Mild intermittent asthma without complication    2. Mixed hyperlipidemia    3. Essential hypertension    4. Graves' disease in remission             REVIEW OF SYMPTOMS    Review of Systems   Constitutional:  Negative for chills and fatigue. HENT:  Negative for congestion and sore throat. Respiratory:  Negative for shortness of breath and wheezing. Cardiovascular:  Negative for chest pain and palpitations. Gastrointestinal:  Negative for abdominal pain and nausea. Genitourinary:  Negative for frequency and urgency. Neurological:  Negative for light-headedness.      PAST MEDICAL HISTORY  Past Medical History:   Diagnosis Date    Chronic cough 11/06/2019    COPD, mild ([address] 12/09/2019    Follows with Dr. José Luis Benavides    Dyspnea on exertion 11/06/2019    Essential hypertension     Follows with PCP    Former smoker 11/06/2019    Gastroesophageal reflux disease without esophagitis 11/06/2019    Graves' disease in remission     History of colon polyps     Hx of cardiovascular stress test 07/22/2022    Normal study    Hyperlipidemia     Mild persistent asthma without complication 13/98/5852    Postnasal drip 11/06/2019    Secondary hypothyroidism     4/3/1995- [facility]; 4/7/1995-[facility]       FAMILY HISTORY  Family History   Problem Relation Age of Onset    Cancer Father         Bladder    Heart Disease Mother     High Blood Pressure Mother     High Cholesterol Mother        SOCIAL HISTORY  Social History     Socioeconomic History    Marital status:    Occupational History    Occupation:  Comment: Retired   Tobacco Use    Smoking status: Former     Packs/day: 1.00     Years: 20.00     Pack years: 20.00     Types: Cigarettes     Start date: 1974     Quit date: 1994     Years since quittin.5    Smokeless tobacco: Never   Vaping Use    Vaping Use: Never used   Substance and Sexual Activity    Alcohol use: Yes     Comment: occ. once a week/Caffiene - Avoids it    Drug use: Never        SURGICAL HISTORY  Past Surgical History:   Procedure Laterality Date    COLONOSCOPY  Last:     Hx: polyps     COLONOSCOPY N/A 2021    COLONOSCOPY POLYPECTOMY SNARE/COLD BIOPSY performed by Bayron Pepper MD at [address]  1586[address]                 CURRENT MEDICATIONS  Current Outpatient Medications   Medication Sig Dispense Refill    benzonatate (TESSALON) 200 MG capsule TAKE 1 CAPSULE BY MOUTH THREE TIMES DAILY AS NEEDED FOR COUGH      EQ 12 HOUR MUCUS RELIEF 600 MG extended release tablet TAKE 2 TABLETS BY MOUTH TWICE DAILY FOR 10 DAYS      simvastatin (ZOCOR) 20 MG tablet Take 1 tablet by mouth daily 90 tablet 1    losartan (COZAAR) 25 MG tablet Take 1 tablet by mouth daily 90 tablet 1    levothyroxine (SYNTHROID) 150 MCG tablet Take 1 tablet by mouth Daily (Alternate with Levothyroxine 137 mcg) 90 tablet 1    amLODIPine (NORVASC) 10 MG tablet Take 1 tablet by mouth daily 90 tablet 1    Multiple Vitamin (MULTI-VITAMINS) TABS Take by mouth      apixaban (ELIQUIS) 5 MG TABS tablet Take 1 tablet by mouth 2 times daily 180 tablet 3    fluticasone-vilanterol (BREO ELLIPTA) 100-25 MCG/INH AEPB inhaler inhale 1 puff by inhalation route once daily at the same time each day (Patient not taking: No sig reported) 60 each 2     No current facility-administered medications for this visit.        ALLERGIES  Allergies   Allergen Reactions    Pcn [Penicillins] Rash       PHYSICAL EXAM    /82 (Site: Left Upper Arm, Position: Sitting, Cuff Size: Medium Adult)   Pulse 71   Ht 6' (1.829 m) Wt 201 lb (91.2 kg)   SpO2 97%   BMI 27.26 kg/m²     Physical Exam  Constitutional:       Appearance: Normal appearance. HENT:      Head: Normocephalic and atraumatic. Eyes:      Extraocular Movements: Extraocular movements intact. Pupils: Pupils are equal, round, and reactive to light. Cardiovascular:      Rate and Rhythm: Normal rate and regular rhythm. Pulses: Normal pulses. Heart sounds: No murmur heard. No friction rub. No gallop. Skin:     General: Skin is warm and dry. Neurological:      General: No focal deficit present. Mental Status: He is alert. Psychiatric:         Mood and Affect: Mood normal.         Behavior: Behavior normal.       ASSESSMENT & PLAN    1. Mixed hyperlipidemia    - simvastatin (ZOCOR) 20 MG tablet; Take 1 tablet by mouth daily  Dispense: 90 tablet; Refill: 1    2. Essential hypertension    - losartan (COZAAR) 25 MG tablet; Take 1 tablet by mouth daily  Dispense: 90 tablet; Refill: 1  - amLODIPine (NORVASC) 10 MG tablet; Take 1 tablet by mouth daily  Dispense: 90 tablet; Refill: 1    3. Graves' disease in remission    - levothyroxine (SYNTHROID) 150 MCG tablet; Take 1 tablet by mouth Daily (Alternate with Levothyroxine 137 mcg)  Dispense: 90 tablet; Refill: 1    4. Mild intermittent asthma without complication      Lipids stable   Bp stable  Asthma controlled w/o inhalers declines albuterol   No follow-ups on file.          Electronically signed by Chloé Sheikh DO on 12/8/2022

## 2023-06-02 ENCOUNTER — OFFICE VISIT (OUTPATIENT)
Dept: CARDIOLOGY CLINIC | Age: 78
End: 2023-06-02

## 2023-06-02 VITALS
SYSTOLIC BLOOD PRESSURE: 118 MMHG | HEIGHT: 72 IN | WEIGHT: 197 LBS | BODY MASS INDEX: 26.68 KG/M2 | HEART RATE: 71 BPM | DIASTOLIC BLOOD PRESSURE: 64 MMHG

## 2023-06-02 DIAGNOSIS — I48.0 PAROXYSMAL ATRIAL FIBRILLATION (HCC): ICD-10-CM

## 2023-06-02 DIAGNOSIS — D68.69 SECONDARY HYPERCOAGULABLE STATE (HCC): ICD-10-CM

## 2023-06-02 DIAGNOSIS — I10 ESSENTIAL HYPERTENSION: ICD-10-CM

## 2023-06-02 DIAGNOSIS — E78.2 MIXED HYPERLIPIDEMIA: ICD-10-CM

## 2023-06-02 DIAGNOSIS — I48.0 PAF (PAROXYSMAL ATRIAL FIBRILLATION) (HCC): Primary | ICD-10-CM

## 2023-06-02 DIAGNOSIS — R06.02 SHORTNESS OF BREATH: ICD-10-CM

## 2023-06-02 NOTE — PROGRESS NOTES
BNP  PT/INR:  No results found for: PTINR  No results found for: LABA1C  Lab Results   Component Value Date    CHOL 135 04/27/2022    TRIG 182 (H) 04/27/2022    HDL 42 12/05/2022    LDLCALC 84 12/05/2022     Lab Results   Component Value Date    ALT 26 12/05/2022    AST 20 12/05/2022     TSH:    Lab Results   Component Value Date/Time    TSH 2.94 04/27/2022 08:30 AM     Ekg: nsr    Impression:  Yamil Herr is a 70 y. o.year old who  has a past medical history of Chronic cough, COPD, mild ([address] Dyspnea on exertion, Essential hypertension, Former smoker, Gastroesophageal reflux disease without esophagitis, Graves' disease in remission, History of colon polyps, Hx of cardiovascular stress test, Hyperlipidemia, Mild persistent asthma without complication, Postnasal drip, and Secondary hypothyroidism. and presents with     Plan:  Paroxysmal afib:s/p cardioversion,heart rate is 40-50 bpm, change lopressor to 12.5 mg bid CHADs score is 2, continue eliquis, will inquire from his insurance company if xarelto or pradaxa/savaysa is cheeper. Dyslipidemia: stable, LDL is 84,continue statins  Exhaustion and shortness of breath, he was x smoker, will get: treadmill stress test ordered  HTN: stable, continue losartan  Hypothyroidism: stable, TSH is 1.5,continue synthroid  Hyperthyroidism: s/p radiation, now has hypothyroidism  Health maintenance: exerise and diet  All labs, medications and tests reviewed, continue all other medications of all above medical condition listed as is.

## 2023-06-06 ENCOUNTER — PROCEDURE VISIT (OUTPATIENT)
Dept: CARDIOLOGY CLINIC | Age: 78
End: 2023-06-06
Payer: MEDICARE

## 2023-06-06 VITALS
BODY MASS INDEX: 26.68 KG/M2 | HEIGHT: 72 IN | HEART RATE: 75 BPM | DIASTOLIC BLOOD PRESSURE: 74 MMHG | WEIGHT: 197 LBS | SYSTOLIC BLOOD PRESSURE: 132 MMHG

## 2023-06-06 DIAGNOSIS — R94.31 ABNORMAL EKG: ICD-10-CM

## 2023-06-06 DIAGNOSIS — R06.02 SHORTNESS OF BREATH: Primary | ICD-10-CM

## 2023-06-06 PROCEDURE — 93015 CV STRESS TEST SUPVJ I&R: CPT | Performed by: INTERNAL MEDICINE

## 2023-07-07 ENCOUNTER — OFFICE VISIT (OUTPATIENT)
Dept: CARDIOLOGY CLINIC | Age: 78
End: 2023-07-07
Payer: MEDICARE

## 2023-07-07 VITALS
DIASTOLIC BLOOD PRESSURE: 74 MMHG | OXYGEN SATURATION: 91 % | BODY MASS INDEX: 26.68 KG/M2 | SYSTOLIC BLOOD PRESSURE: 120 MMHG | HEART RATE: 81 BPM | HEIGHT: 72 IN | WEIGHT: 197 LBS

## 2023-07-07 DIAGNOSIS — I10 ESSENTIAL HYPERTENSION: ICD-10-CM

## 2023-07-07 DIAGNOSIS — D68.69 SECONDARY HYPERCOAGULABLE STATE (HCC): ICD-10-CM

## 2023-07-07 DIAGNOSIS — E78.2 MIXED HYPERLIPIDEMIA: ICD-10-CM

## 2023-07-07 DIAGNOSIS — R06.02 SHORTNESS OF BREATH: Primary | ICD-10-CM

## 2023-07-07 DIAGNOSIS — I48.0 PAF (PAROXYSMAL ATRIAL FIBRILLATION) (HCC): ICD-10-CM

## 2023-07-07 PROCEDURE — 1036F TOBACCO NON-USER: CPT | Performed by: INTERNAL MEDICINE

## 2023-07-07 PROCEDURE — 3074F SYST BP LT 130 MM HG: CPT | Performed by: INTERNAL MEDICINE

## 2023-07-07 PROCEDURE — G8427 DOCREV CUR MEDS BY ELIG CLIN: HCPCS | Performed by: INTERNAL MEDICINE

## 2023-07-07 PROCEDURE — 99214 OFFICE O/P EST MOD 30 MIN: CPT | Performed by: INTERNAL MEDICINE

## 2023-07-07 PROCEDURE — 1123F ACP DISCUSS/DSCN MKR DOCD: CPT | Performed by: INTERNAL MEDICINE

## 2023-07-07 PROCEDURE — 3078F DIAST BP <80 MM HG: CPT | Performed by: INTERNAL MEDICINE

## 2023-07-07 PROCEDURE — G8417 CALC BMI ABV UP PARAM F/U: HCPCS | Performed by: INTERNAL MEDICINE

## 2023-07-28 DIAGNOSIS — E05.00 GRAVES' DISEASE IN REMISSION: ICD-10-CM

## 2023-07-28 DIAGNOSIS — I10 ESSENTIAL HYPERTENSION: ICD-10-CM

## 2023-07-28 DIAGNOSIS — E78.2 MIXED HYPERLIPIDEMIA: ICD-10-CM

## 2023-07-28 RX ORDER — AMLODIPINE BESYLATE 10 MG/1
TABLET ORAL
Qty: 180 TABLET | Refills: 0 | OUTPATIENT
Start: 2023-07-28

## 2023-07-28 RX ORDER — LOSARTAN POTASSIUM 25 MG/1
TABLET ORAL
Qty: 90 TABLET | Refills: 0 | OUTPATIENT
Start: 2023-07-28

## 2023-07-28 RX ORDER — SIMVASTATIN 20 MG
TABLET ORAL
Qty: 90 TABLET | Refills: 0 | OUTPATIENT
Start: 2023-07-28

## 2023-07-28 RX ORDER — LEVOTHYROXINE SODIUM 0.15 MG/1
TABLET ORAL
Qty: 90 TABLET | Refills: 0 | OUTPATIENT
Start: 2023-07-28

## 2023-11-27 ENCOUNTER — TELEPHONE (OUTPATIENT)
Dept: CARDIOLOGY CLINIC | Age: 78
End: 2023-11-27

## 2023-11-27 NOTE — TELEPHONE ENCOUNTER
Rec'vd cardiac clearance at 5:03 on Wed. 11/22/23 after the office closed, and we were closed on Thurs and Friday/Holiday-for the surg. 11/27/23. I called the surgeon office- the patient had the surgery, but went another direction for anesthesia.

## 2023-12-07 ENCOUNTER — OFFICE VISIT (OUTPATIENT)
Dept: CARDIOLOGY CLINIC | Age: 78
End: 2023-12-07
Payer: MEDICARE

## 2023-12-07 VITALS
DIASTOLIC BLOOD PRESSURE: 74 MMHG | BODY MASS INDEX: 26.22 KG/M2 | WEIGHT: 193.6 LBS | RESPIRATION RATE: 16 BRPM | OXYGEN SATURATION: 92 % | SYSTOLIC BLOOD PRESSURE: 138 MMHG | HEIGHT: 72 IN | HEART RATE: 81 BPM

## 2023-12-07 DIAGNOSIS — E78.2 MIXED HYPERLIPIDEMIA: ICD-10-CM

## 2023-12-07 DIAGNOSIS — R06.02 SHORTNESS OF BREATH: Primary | ICD-10-CM

## 2023-12-07 DIAGNOSIS — R94.31 ABNORMAL EKG: ICD-10-CM

## 2023-12-07 DIAGNOSIS — I10 ESSENTIAL HYPERTENSION: ICD-10-CM

## 2023-12-07 DIAGNOSIS — I48.0 PAROXYSMAL ATRIAL FIBRILLATION (HCC): ICD-10-CM

## 2023-12-07 DIAGNOSIS — D68.69 SECONDARY HYPERCOAGULABLE STATE (HCC): ICD-10-CM

## 2023-12-07 PROCEDURE — G8484 FLU IMMUNIZE NO ADMIN: HCPCS | Performed by: INTERNAL MEDICINE

## 2023-12-07 PROCEDURE — 3075F SYST BP GE 130 - 139MM HG: CPT | Performed by: INTERNAL MEDICINE

## 2023-12-07 PROCEDURE — G8417 CALC BMI ABV UP PARAM F/U: HCPCS | Performed by: INTERNAL MEDICINE

## 2023-12-07 PROCEDURE — 1123F ACP DISCUSS/DSCN MKR DOCD: CPT | Performed by: INTERNAL MEDICINE

## 2023-12-07 PROCEDURE — 3078F DIAST BP <80 MM HG: CPT | Performed by: INTERNAL MEDICINE

## 2023-12-07 PROCEDURE — 99214 OFFICE O/P EST MOD 30 MIN: CPT | Performed by: INTERNAL MEDICINE

## 2023-12-07 PROCEDURE — 1036F TOBACCO NON-USER: CPT | Performed by: INTERNAL MEDICINE

## 2023-12-07 PROCEDURE — G8427 DOCREV CUR MEDS BY ELIG CLIN: HCPCS | Performed by: INTERNAL MEDICINE

## 2023-12-07 RX ORDER — CETIRIZINE HYDROCHLORIDE 10 MG/1
10 TABLET ORAL DAILY
COMMUNITY
Start: 2023-10-31 | End: 2024-07-27

## 2023-12-07 NOTE — PROGRESS NOTES
Brandin Ramon MD        OFFICE  FOLLOWUP NOTE    Chief complaints: patient is here for management of  HTN, AFIB, DYSLPIDEMIA. Hypothyroidism, COPD     Subjective: patient feels better, no chest pain, no shortness of breath, no dizziness, no palpitations    HPI Panchito Murdock is a 70 y. o.year old who  has a past medical history of Chronic cough, COPD, mild ([address]), Dyspnea on exertion, Essential hypertension, Former smoker, Gastroesophageal reflux disease without esophagitis, Graves' disease in remission, History of colon polyps, Hx of cardiovascular stress test, Hyperlipidemia, Mild persistent asthma without complication, Postnasal drip, and Secondary hypothyroidism. and presents for management of  HTN, AFIB, DYSLPIDEMIA.  Hypothyroidism, COPD which are well controlled      Current Outpatient Medications   Medication Sig Dispense Refill    cetirizine (ZYRTEC) 10 MG tablet Take 1 tablet by mouth daily      apixaban (ELIQUIS) 5 MG TABS tablet Take 1 tablet by mouth 2 times daily 180 tablet 3    simvastatin (ZOCOR) 20 MG tablet Take 1 tablet by mouth daily 90 tablet 1    losartan (COZAAR) 25 MG tablet Take 1 tablet by mouth daily 90 tablet 1    levothyroxine (SYNTHROID) 150 MCG tablet Take 1 tablet by mouth Daily (Alternate with Levothyroxine 137 mcg) 90 tablet 1    amLODIPine (NORVASC) 10 MG tablet Take 1 tablet by mouth daily 90 tablet 1    Multiple Vitamin (MULTI-VITAMINS) TABS Take by mouth      benzonatate (TESSALON) 200 MG capsule TAKE 1 CAPSULE BY MOUTH THREE TIMES DAILY AS NEEDED FOR COUGH (Patient not taking: Reported on 12/7/2023)      EQ 12 HOUR MUCUS RELIEF 600 MG extended release tablet TAKE 2 TABLETS BY MOUTH TWICE DAILY FOR 10 DAYS (Patient not taking: Reported on 6/2/2023)      famotidine (PEPCID) 20 MG tablet Take 1 tablet by mouth 2 times daily (Patient not taking: Reported on 12/7/2023) 60 tablet 0    fluticasone-vilanterol (BREO ELLIPTA) 100-25 MCG/INH AEPB inhaler inhale 1 puff by

## 2024-07-11 ENCOUNTER — OFFICE VISIT (OUTPATIENT)
Dept: CARDIOLOGY CLINIC | Age: 79
End: 2024-07-11
Payer: MEDICARE

## 2024-07-11 VITALS
WEIGHT: 190 LBS | SYSTOLIC BLOOD PRESSURE: 116 MMHG | HEART RATE: 68 BPM | RESPIRATION RATE: 20 BRPM | DIASTOLIC BLOOD PRESSURE: 74 MMHG | BODY MASS INDEX: 25.77 KG/M2 | OXYGEN SATURATION: 93 %

## 2024-07-11 DIAGNOSIS — I48.0 PAF (PAROXYSMAL ATRIAL FIBRILLATION) (HCC): Primary | ICD-10-CM

## 2024-07-11 DIAGNOSIS — I49.3 FREQUENT PVCS: ICD-10-CM

## 2024-07-11 DIAGNOSIS — I10 ESSENTIAL HYPERTENSION: ICD-10-CM

## 2024-07-11 DIAGNOSIS — E78.2 MIXED HYPERLIPIDEMIA: ICD-10-CM

## 2024-07-11 PROCEDURE — 99214 OFFICE O/P EST MOD 30 MIN: CPT | Performed by: NURSE PRACTITIONER

## 2024-07-11 PROCEDURE — G8427 DOCREV CUR MEDS BY ELIG CLIN: HCPCS | Performed by: NURSE PRACTITIONER

## 2024-07-11 PROCEDURE — 93000 ELECTROCARDIOGRAM COMPLETE: CPT | Performed by: NURSE PRACTITIONER

## 2024-07-11 PROCEDURE — G8417 CALC BMI ABV UP PARAM F/U: HCPCS | Performed by: NURSE PRACTITIONER

## 2024-07-11 PROCEDURE — 1123F ACP DISCUSS/DSCN MKR DOCD: CPT | Performed by: NURSE PRACTITIONER

## 2024-07-11 PROCEDURE — 3078F DIAST BP <80 MM HG: CPT | Performed by: NURSE PRACTITIONER

## 2024-07-11 PROCEDURE — 1036F TOBACCO NON-USER: CPT | Performed by: NURSE PRACTITIONER

## 2024-07-11 PROCEDURE — 3074F SYST BP LT 130 MM HG: CPT | Performed by: NURSE PRACTITIONER

## 2024-07-11 RX ORDER — ATENOLOL 25 MG/1
12.5 TABLET ORAL DAILY
Qty: 30 TABLET | Refills: 3 | Status: SHIPPED | OUTPATIENT
Start: 2024-07-11

## 2024-07-11 RX ORDER — ACETAMINOPHEN 160 MG
TABLET,DISINTEGRATING ORAL
COMMUNITY

## 2024-07-11 RX ORDER — MYCOPHENOLATE MOFETIL 500 MG/1
1500 TABLET ORAL 2 TIMES DAILY
COMMUNITY
Start: 2024-05-08

## 2024-07-11 ASSESSMENT — ENCOUNTER SYMPTOMS: SHORTNESS OF BREATH: 0

## 2024-07-11 NOTE — PROGRESS NOTES
nightly .     Hyperlipidemia   -At or near goal Yes, LDL 43  -He is to continue current medications (Zocor 20 mg) Hepatic function panel WNL. No abdominal pain. No myalgias.     -The nature of cardiac risk has been fully discussed with this patient. I have made him aware of his LDL target goal given his cardiovascular risk analysis. I have discussed the appropriate diet. The need for lifelong compliance in order to reduce risk is stressed. A regular exercise program is recommended to help achieve and maintain normal body weight, fitness and improve lipid balance.     Essential hypertension   - Stable continue with Norvasc 10 mg daily and stop losartan and start atenolol 12.5 mg daily for rate control.    Patient seen, interviewed and examined. Testing was reviewed.      Patient is encouraged to exercise even a brisk walk for 30 minutes at least 3 to 4 times a week.  Lifestyle and risk factor modificatons discussed. Various goals are discussed and questions answered. Continue current medications. Appropriate prescriptions are addressed.  Questions answered and patient verbalizes understanding.     Call for any problems, questions, or concerns.    Pt is to follow up in 2 weeks for Cardiac management    Electronically signed by MACK Henriquez CNP on 7/11/2024 at 10:53 AM

## 2024-07-29 ENCOUNTER — NURSE ONLY (OUTPATIENT)
Dept: CARDIOLOGY CLINIC | Age: 79
End: 2024-07-29
Payer: MEDICARE

## 2024-07-29 VITALS — SYSTOLIC BLOOD PRESSURE: 120 MMHG | HEART RATE: 56 BPM | DIASTOLIC BLOOD PRESSURE: 72 MMHG | OXYGEN SATURATION: 94 %

## 2024-07-29 DIAGNOSIS — I48.0 PAF (PAROXYSMAL ATRIAL FIBRILLATION) (HCC): Primary | ICD-10-CM

## 2024-07-29 PROCEDURE — 3074F SYST BP LT 130 MM HG: CPT | Performed by: NURSE PRACTITIONER

## 2024-07-29 PROCEDURE — 93000 ELECTROCARDIOGRAM COMPLETE: CPT | Performed by: NURSE PRACTITIONER

## 2024-07-29 PROCEDURE — 3078F DIAST BP <80 MM HG: CPT | Performed by: NURSE PRACTITIONER

## 2024-07-29 PROCEDURE — 1123F ACP DISCUSS/DSCN MKR DOCD: CPT | Performed by: NURSE PRACTITIONER

## 2024-07-29 PROCEDURE — 99213 OFFICE O/P EST LOW 20 MIN: CPT | Performed by: NURSE PRACTITIONER

## 2024-07-29 NOTE — PROGRESS NOTES
[place] [address]  Phone: [phone]    Fax [phone]                  Christelle Mcgrath MD, [facility]       Miguelangel Arroyo MD, [facility]  Cruz Villa MD, [facility]    MD Maggi Carvalho MD,[facility]    Filemon Chow MD, [facility]  Yady Cuevas MD, [facility]  Carly Lancaster, MACK Gonzalez, MACK Chow, MACK Melchor          HPI:Pt is here for a 2 week BP check. Patient started on atenolol 12.5 mg daily and magnesium 250 mg for multifocal frequent PVCs.     Results:  Vitals:    07/29/24 0926   BP: 120/72   Site: Left Upper Arm   Position: Sitting   Cuff Size: Medium Adult   Pulse: 56   SpO2: 94%        Wt Readings from Last 3 Encounters:   07/11/24 86.2 kg (190 lb)   12/07/23 87.8 kg (193 lb 9.6 oz)   07/07/23 89.4 kg (197 lb)        Assessment/Plan    Hypertension: Plan for pt is to continue with current medications.    PAF: Currently sinus bradycardia and recent frequent trigeminy PVCs.  EKG today shows sinus bradycardia patient remains asymptomatic will remain on current medications recommend to take atenolol in a.m.  Recent TSH high and recently had synthroid adjusted.     Follow up in 3 months     Pt is to report any dizziness or syncope to the office.        Electronically signed by MACK Henriquez - CNP on 7/29/2024 at 9:37 AM

## 2024-10-31 ENCOUNTER — OFFICE VISIT (OUTPATIENT)
Dept: CARDIOLOGY CLINIC | Age: 79
End: 2024-10-31

## 2024-10-31 VITALS
HEART RATE: 62 BPM | DIASTOLIC BLOOD PRESSURE: 72 MMHG | BODY MASS INDEX: 25.87 KG/M2 | SYSTOLIC BLOOD PRESSURE: 110 MMHG | OXYGEN SATURATION: 94 % | HEIGHT: 72 IN | WEIGHT: 191 LBS

## 2024-10-31 DIAGNOSIS — E78.2 MIXED HYPERLIPIDEMIA: ICD-10-CM

## 2024-10-31 DIAGNOSIS — I49.3 FREQUENT PVCS: ICD-10-CM

## 2024-10-31 DIAGNOSIS — I10 ESSENTIAL HYPERTENSION: Primary | ICD-10-CM

## 2024-10-31 DIAGNOSIS — I48.0 PAF (PAROXYSMAL ATRIAL FIBRILLATION) (HCC): ICD-10-CM

## 2024-10-31 ASSESSMENT — ENCOUNTER SYMPTOMS: SHORTNESS OF BREATH: 0

## 2024-10-31 NOTE — PROGRESS NOTES
[place] [address]  Phone: [phone]    Fax [phone]    Christelle Mcgrath MD, [facility]  Miguelangel Arroyo MD, [facility]   Cruz Villa MD, [facility] MD Maggi Carvalho MD, [facility]  Filemon Chow MD, [facility]    Yady Cuevas MD, [facility]   Carly Lancaster, APRN  Mirta Gonzalez, APRN  Fanny Chow, APRN  Balta Almeida, APRN    Cardiology Progress Note      10/31/2024    RE: Tip Barton  (1945)                             Primary cardiologist: Dr. Italia Carr       Subjective:  CC:   1. Essential hypertension    2. Mixed hyperlipidemia    3. PAF (paroxysmal atrial fibrillation) (HCC)    4. Frequent PVCs        HPI: Tip Barton, who is a  79 y.o. year old male with a past medical history as listed below.  Patient presents to the office for follow up on PAF, HTN, and hyperlipidemia.  Patient had DILMA/DCCV 7/28/2022 which successfully cardioverted him to sinus rhythm.   Patient denies any chest pain, shortness of breath, dizziness, syncope, or palpitations.    Past Medical History:   Diagnosis Date    Chronic cough 11/06/2019    COPD, mild (HCC) 12/09/2019    Follows with Dr. Rodney    Dyspnea on exertion 11/06/2019    Essential hypertension     Follows with PCP    Former smoker 11/06/2019    Gastroesophageal reflux disease without esophagitis 11/06/2019    Graves' disease in remission     History of colon polyps     Hx of cardiovascular stress test 07/22/2022    Normal study    Hyperlipidemia     Mild persistent asthma without complication 06/08/2020    Postnasal drip 11/06/2019    Secondary hypothyroidism     4/3/1995- HENRY 123; 4/7/1995-HENRY 131       Current Outpatient Medications   Medication Sig Dispense Refill    mycophenolate (CELLCEPT) 500 MG tablet Take 3 tablets by mouth 2 times daily      Cholecalciferol (VITAMIN D3) 50 MCG (2000 UT) CAPS Take by mouth      Magnesium Oxide (MAGNESIUM-OXIDE) 250 MG TABS tablet Take 1 tablet by mouth

## 2024-11-04 ENCOUNTER — TELEPHONE (OUTPATIENT)
Dept: CARDIOLOGY CLINIC | Age: 79
End: 2024-11-04

## 2024-11-05 ENCOUNTER — TELEPHONE (OUTPATIENT)
Dept: CARDIOLOGY CLINIC | Age: 79
End: 2024-11-05

## 2025-03-24 ENCOUNTER — TELEPHONE (OUTPATIENT)
Dept: CARDIOLOGY CLINIC | Age: 80
End: 2025-03-24

## 2025-03-24 ENCOUNTER — OFFICE VISIT (OUTPATIENT)
Dept: CARDIOLOGY CLINIC | Age: 80
End: 2025-03-24
Payer: MEDICARE

## 2025-03-24 VITALS
WEIGHT: 196 LBS | HEART RATE: 68 BPM | SYSTOLIC BLOOD PRESSURE: 114 MMHG | BODY MASS INDEX: 26.55 KG/M2 | HEIGHT: 72 IN | DIASTOLIC BLOOD PRESSURE: 76 MMHG

## 2025-03-24 DIAGNOSIS — I10 ESSENTIAL HYPERTENSION: Primary | ICD-10-CM

## 2025-03-24 DIAGNOSIS — I49.3 FREQUENT PVCS: ICD-10-CM

## 2025-03-24 DIAGNOSIS — D68.69 SECONDARY HYPERCOAGULABLE STATE: ICD-10-CM

## 2025-03-24 DIAGNOSIS — E05.00 GRAVES' DISEASE IN REMISSION: ICD-10-CM

## 2025-03-24 DIAGNOSIS — E78.2 MIXED HYPERLIPIDEMIA: ICD-10-CM

## 2025-03-24 DIAGNOSIS — I48.0 PAF (PAROXYSMAL ATRIAL FIBRILLATION) (HCC): ICD-10-CM

## 2025-03-24 DIAGNOSIS — I48.0 PAROXYSMAL ATRIAL FIBRILLATION (HCC): Primary | ICD-10-CM

## 2025-03-24 PROCEDURE — 3074F SYST BP LT 130 MM HG: CPT | Performed by: INTERNAL MEDICINE

## 2025-03-24 PROCEDURE — G8427 DOCREV CUR MEDS BY ELIG CLIN: HCPCS | Performed by: INTERNAL MEDICINE

## 2025-03-24 PROCEDURE — 99204 OFFICE O/P NEW MOD 45 MIN: CPT | Performed by: INTERNAL MEDICINE

## 2025-03-24 PROCEDURE — 3078F DIAST BP <80 MM HG: CPT | Performed by: INTERNAL MEDICINE

## 2025-03-24 PROCEDURE — 1036F TOBACCO NON-USER: CPT | Performed by: INTERNAL MEDICINE

## 2025-03-24 PROCEDURE — 1123F ACP DISCUSS/DSCN MKR DOCD: CPT | Performed by: INTERNAL MEDICINE

## 2025-03-24 PROCEDURE — 1159F MED LIST DOCD IN RCRD: CPT | Performed by: INTERNAL MEDICINE

## 2025-03-24 PROCEDURE — G8417 CALC BMI ABV UP PARAM F/U: HCPCS | Performed by: INTERNAL MEDICINE

## 2025-03-24 NOTE — PROGRESS NOTES
CLINICAL STAFF DOCUMENTATION    Dr. Robby Barton  1945  [id number]    Have you had any Chest Pain recently? - No  Have you had any Shortness of Breath - No  Have you had any dizziness - No  Have you had any palpitations recently? - No  Do you have any edema - swelling in No      When did you have your last labs drawn 12/24  What doctor ordered [place]  Do we have the labs in their chart Yes    Do you have a surgery or procedure scheduled in the near future - No    Do use tobacco products? - No  Do you drink alcohol? - Yes - 1 drink weekly  Do you use any illicit drugs? - No  Caffeine? - No  How much caffeine?     Check medication list thoroughly!!! AND RECONCILE OUTSIDE MEDICATIONS  If dose has changed change the entire order not just the MG  BE SURE TO ASK PATIENT IF THEY NEED MEDICATION REFILLS  Verify Pharmacy and update if incorrect  
  Lab Results   Component Value Date    TSH 2.94 04/27/2022     Lab Results   Component Value Date    AST 20 12/05/2022    ALT 26 12/05/2022    BILITOT 0.5 12/05/2022    ALKPHOS 91 12/05/2022     Lab Results   Component Value Date    PROBNP 23 11/16/2021     No results found for: \"LABA1C\"  Lab Results   Component Value Date    WBC 7.6 07/18/2022    HGB 16.8 07/18/2022    HCT 49.2 07/18/2022     07/18/2022     All labs, medications and tests reviewed by myself including data and history from outside source , patient and available family .  Assessment & Plan:      1. Essential hypertension    2. Frequent PVCs    3. Graves' disease in remission    4. Mixed hyperlipidemia    5. PAF (paroxysmal atrial fibrillation) (HCC)    6. Secondary hypercoagulable state         No problem-specific Assessment & Plan notes found for this encounter.    Afib  Normally sees Dr. Carr he had a DILMA back in 2022 revealed that he has a very small left atrial appendage he has been bruising a lot and having difficulty tolerating Eliquis because he has been on CellCept due to interstitial lung disease he reports coughing  Patent discussion about watchman we will plan DILMA and to determine if he is a candidate for watchman  [id number] Score for Atrial Fibrillation Stroke Risk   Risk   Factors  Component Value   C CHF No 0   H HTN Yes 1   A2 Age >= 75 Yes,  (79 y.o.) 2   D DM No 0   S2 Prior Stroke/TIA No 0   V Vascular Disease No 0   A Age 65-74 No,  (79 y.o.) 0   Sc Sex male 0    [id number]  Score  3   Score last updated 3/24/25 10:02 AM EDT    Click here for a link to the UpToDate guideline \"Atrial Fibrillation: Anticoagulation therapy to prevent embolization    Disclaimer: Risk Score calculation is dependent on accuracy of patient problem list and past encounter diagnosis.    HAS-BLED Score                          Risk Factors      Points   Hypertension  Uncontrolled, >160 mmHg systolic   No=0   Renal disease  Dialysis,

## 2025-03-24 NOTE — PATIENT INSTRUCTIONS
Thank you for allowing us to care for you today!   We want to ensure we can follow your treatment plan and we strive to give you the best outcomes and experience possible.   If you ever have a life threatening emergency and call 911 - for an ambulance (EMS)  REMEMBER  Our providers can only care for you at:   [facility] or [facility]   Even if you have someone take you or you drive yourself we can only care for you in a [facility] facility. Our providers are not setup at the other healthcare locations!    PLEASE CALL OUR OFFICE DURING NORMAL BUSINESS HOURS  Monday through Friday 8 am to 5 pm  AFTER HOURS the physician on-call cannot help with scheduling, rescheduling, procedure instruction questions or any type of medication need or issue.  [facility] P:[phone] - [facility] P:[phone] - [facility] P:[phone]      If you receive a survey:  We would appreciate you taking the time to share your experience concerning your provider visit in the office.    These surveys are confidential!  We are eager to improve and are counting on you to share your feedback so we can ensure you get the best care possible.

## 2025-03-24 NOTE — TELEPHONE ENCOUNTER
DILMA pre Wm ordered  Placed call to patient to notify of DILMA date and time. Left VM requesting return call.

## 2025-03-26 NOTE — TELEPHONE ENCOUNTER
[facility]     Dr. Robby Chow     Transesophageal Echocardiogram    Patient Name: Tip Barton   : 1945   MRN# [id number]    Date of Procedure: 2025  Time: 12:30   Arrival Time: 11:30   (Arrival time is scheduled for one (1) hour before procedure is scheduled.)     Hospital: [facility] ([facility])           Please take your insurance card and I.D. with you to the hospital.    X   Please do not have anything by mouth after midnight prior to or 8 hours before         the procedure.    X   You may take your medication with a sip of water unless advised otherwise below.     x Please continue to take Eliquis (apixaban) as directed.

## 2025-03-28 NOTE — TELEPHONE ENCOUNTER
Patient was called & educated on DILMA for Dx: afib.  Procedure is scheduled for 4/2/2025 @ 12:30, w/arrival @ 11:30, @ [facility].     Procedure and risks were explained to patient. Consent forms were signed.  Instructions were given to patient to remain NPO after midnight the night before procedure.  Patient may take morning meds the morning of procedure with small amount of water.     Patient was notified that procedure could be delayed due to an emergency. Patient voiced understanding. Copies of consent, pre-testing orders, & instructions scanned into media

## 2025-04-02 ENCOUNTER — HOSPITAL ENCOUNTER (OUTPATIENT)
Dept: NON INVASIVE DIAGNOSTICS | Age: 80
Discharge: HOME | End: 2025-04-04
Attending: INTERNAL MEDICINE
Payer: MEDICARE

## 2025-04-02 VITALS
HEIGHT: 72 IN | HEART RATE: 56 BPM | RESPIRATION RATE: 19 BRPM | BODY MASS INDEX: 26.55 KG/M2 | SYSTOLIC BLOOD PRESSURE: 135 MMHG | WEIGHT: 196 LBS | OXYGEN SATURATION: 93 % | DIASTOLIC BLOOD PRESSURE: 81 MMHG

## 2025-04-02 DIAGNOSIS — I48.0 PAROXYSMAL ATRIAL FIBRILLATION (HCC): ICD-10-CM

## 2025-04-02 LAB — ECHO BSA: 2.13 M2

## 2025-04-02 PROCEDURE — 99152 MOD SED SAME PHYS/QHP 5/>YRS: CPT | Performed by: INTERNAL MEDICINE

## 2025-04-02 PROCEDURE — 7100000010 HC PHASE II RECOVERY - FIRST 15 MIN

## 2025-04-02 PROCEDURE — 6370000000 HC RX 637 (ALT 250 FOR IP): Performed by: INTERNAL MEDICINE

## 2025-04-02 PROCEDURE — 93325 DOPPLER ECHO COLOR FLOW MAPG: CPT

## 2025-04-02 PROCEDURE — 99152 MOD SED SAME PHYS/QHP 5/>YRS: CPT

## 2025-04-02 PROCEDURE — 6360000002 HC RX W HCPCS: Performed by: INTERNAL MEDICINE

## 2025-04-02 PROCEDURE — 7100000011 HC PHASE II RECOVERY - ADDTL 15 MIN

## 2025-04-02 RX ORDER — LIDOCAINE HYDROCHLORIDE 20 MG/ML
SOLUTION OROPHARYNGEAL PRN
Status: COMPLETED | OUTPATIENT
Start: 2025-04-02 | End: 2025-04-02

## 2025-04-02 RX ORDER — MIDAZOLAM HYDROCHLORIDE 1 MG/ML
INJECTION, SOLUTION INTRAMUSCULAR; INTRAVENOUS PRN
Status: COMPLETED | OUTPATIENT
Start: 2025-04-02 | End: 2025-04-02

## 2025-04-02 RX ORDER — FENTANYL CITRATE 50 UG/ML
INJECTION, SOLUTION INTRAMUSCULAR; INTRAVENOUS PRN
Status: COMPLETED | OUTPATIENT
Start: 2025-04-02 | End: 2025-04-02

## 2025-04-02 RX ADMIN — MIDAZOLAM 1 MG: 1 INJECTION INTRAMUSCULAR; INTRAVENOUS at 13:52

## 2025-04-02 RX ADMIN — FENTANYL CITRATE 50 MCG: 50 INJECTION, SOLUTION INTRAMUSCULAR; INTRAVENOUS at 13:52

## 2025-04-02 RX ADMIN — LIDOCAINE HYDROCHLORIDE 15 ML: 20 SOLUTION ORAL at 13:50

## 2025-04-02 RX ADMIN — MIDAZOLAM 1 MG: 1 INJECTION INTRAMUSCULAR; INTRAVENOUS at 13:56

## 2025-04-02 NOTE — CARE COORDINATION
Saw patient for Pre Watchman Education.   My role as Watchman Coordinator explained.   Watchman discussed, Brochure provided and NPM video shared.   Nice Tool utilized and filled out.   Will fax NICE Tool/SD to Dr Cespedes who discussed Watchman at a previous visit.    Explained to the patient:  Part of the FDA approval of the Watchman procedure in 2015 mandated that each procedure must participate in a national registry, this requires several follow up appointments and testing following the procedure.      These expectations Include:      7-10 day follow up with the Nurse practitioner or Implanting Physician    45 day follow up lab work and DILMA to check positioning of the device.      6 month follow up telephone call     1 year follow up appointment and lab work (DILMA per Implanting physician discretion)    2  year follow up appointment and lab work .    Discussed the importance of blood thinners as prescribed and that the patient cannot come off those blood thinners until discontinued by the implanting physician.     Assessment/History of:    Nickel or metal allergy?  [] Yes     [x] No    Contrast allergy?  [] Yes     [x] No    Anesthesia issues?  [] Yes     [x] No    Difficulty swallowing?  [] Yes     [x] No    Do you have any procedures/surgeries planned that would interrupt Plavix and ASA for next 6 months?  [] Yes     [x] No    History of Sleep Apnea?  [] Yes     [x] No    Do you wear a CPAP?  [] Yes     [x] No      Modified Darron Scale:    [x] 0: No symptoms at all  [] 1: No significant disability despite symptoms  [] 2: Slight disability   [] 3: Moderate disability  [] 4: Moderately severe disability  [] 5: Severe disability    [] Modified Darron Not Administered      All questions and concerns answered.  Rose to schedule Watchman once DILMA complete.  Will follow    Carly Tyson RN  Watchman CoordinatorElectronically signed by Carly Tyson RN on 4/2/2025 at 12:45 PM Watchman Care

## 2025-04-02 NOTE — H&P
Tip Barton, 79 y.o., male    Primary care physician:  Whitley Cespedes MD     Chief Complaint:  afib    History of Present Illness:   Comes in for discussion for possibly getting watchman due to concerns for bleeding bruising a lot he has been on Eliquis due to history of atrial fibrillation  Normally sees Dr. Carr he has history of DILMA cardioversion back in July 2022 he denies any palpitations he has had 1 fall which he believes was a mechanical but he has been bruising   Afib  Normally sees Dr. Carr he had a DILMA back in 2022 revealed that he has a very small left atrial appendage he has been bruising a lot and having difficulty tolerating Eliquis because he has been on CellCept due to interstitial lung disease he reports coughing  Patent discussion about watchman we will plan DILMA and to determine if he is a candidate for watchman  [id number] Score for Atrial Fibrillation Stroke Risk    Risk   Factors   Component Value   C CHF No 0   H HTN Yes 1   A2 Age >= 75 Yes,  (79 y.o.) 2   D DM No 0   S2 Prior Stroke/TIA No 0   V Vascular Disease No 0   A Age 65-74 No,  (79 y.o.) 0   Sc Sex male 0     [id number]  Score   3   Score last updated 3/24/25 10:02 AM EDT     Click here for a link to the UpToDate guideline \"Atrial Fibrillation: Anticoagulation therapy to prevent embolization     Disclaimer: Risk Score calculation is dependent on accuracy of patient problem list and past encounter diagnosis.               HAS-BLED Score                          Risk Factors      Points   Hypertension  Uncontrolled, >160 mmHg systolic    No=0   Renal disease  Dialysis, transplant, Cr>2.26 mg/dL or >200 µmol/L    No=0   Liver disease   Cirrhosis or bilirubin >2x normal with AST/ALT/AP >3x normal    No=0   Stroke history    No=0   Prior major bleeding or predisposition to bleeding     No=0   Labile  INR  Unstable/high INRs, time in therapeutic range <60%    No=0   Age >65    Yes=1   Medication usage predisposing to

## 2025-04-16 ENCOUNTER — TELEPHONE (OUTPATIENT)
Dept: CARDIOLOGY CLINIC | Age: 80
End: 2025-04-16

## 2025-04-16 NOTE — TELEPHONE ENCOUNTER
Placed call to patient. Left VM informing him of Tentative Watchman procedure date in late May or June.

## 2025-04-22 NOTE — PROGRESS NOTES
Tilt Table        Other Type of Procedure:   Cardiac Clearance  ([place] [facility])   Needs Further Workup Not Cleared Cleared ([place] risk below)     Low Risk     Moderate Risk     High Risk   Additional Notes:   Consult/ Referral   CT Surgery Electrophysiology TAVR Coordination ([facility]) [facility] Venous Ablation Consult Watchman Consult  ([place] [facility] Below)        Ab Chow   Additional Notes:   Labs Date Last Completed Date Expected Retrieve from outside source?    BMP       BNP       CBC 04/01/2025      INR       Lipid 05/08/2024      TSH 12/09/2024      A1C       Other: CMP 05/08/2024     Additional Notes:   Medication/Samples Type of Medication Additional Notes    Samples of      Paper script      Research Study     Other:   Next Office Visit  ([place] [facility])   1 month 3 months 6 months 9 months 1 year PRN 1 week 2 weeks   Additional Notes:

## 2025-04-30 ENCOUNTER — OFFICE VISIT (OUTPATIENT)
Dept: CARDIOLOGY CLINIC | Age: 80
End: 2025-04-30
Payer: MEDICARE

## 2025-04-30 VITALS
HEART RATE: 68 BPM | DIASTOLIC BLOOD PRESSURE: 70 MMHG | SYSTOLIC BLOOD PRESSURE: 132 MMHG | HEIGHT: 72 IN | WEIGHT: 194.6 LBS | BODY MASS INDEX: 26.36 KG/M2

## 2025-04-30 DIAGNOSIS — I10 ESSENTIAL HYPERTENSION: ICD-10-CM

## 2025-04-30 DIAGNOSIS — I48.0 PAF (PAROXYSMAL ATRIAL FIBRILLATION) (HCC): Primary | ICD-10-CM

## 2025-04-30 DIAGNOSIS — E78.2 MIXED HYPERLIPIDEMIA: ICD-10-CM

## 2025-04-30 PROCEDURE — G8417 CALC BMI ABV UP PARAM F/U: HCPCS | Performed by: NURSE PRACTITIONER

## 2025-04-30 PROCEDURE — 3078F DIAST BP <80 MM HG: CPT | Performed by: NURSE PRACTITIONER

## 2025-04-30 PROCEDURE — 99214 OFFICE O/P EST MOD 30 MIN: CPT | Performed by: NURSE PRACTITIONER

## 2025-04-30 PROCEDURE — 1123F ACP DISCUSS/DSCN MKR DOCD: CPT | Performed by: NURSE PRACTITIONER

## 2025-04-30 PROCEDURE — G8427 DOCREV CUR MEDS BY ELIG CLIN: HCPCS | Performed by: NURSE PRACTITIONER

## 2025-04-30 PROCEDURE — 1036F TOBACCO NON-USER: CPT | Performed by: NURSE PRACTITIONER

## 2025-04-30 PROCEDURE — 1159F MED LIST DOCD IN RCRD: CPT | Performed by: NURSE PRACTITIONER

## 2025-04-30 PROCEDURE — 3075F SYST BP GE 130 - 139MM HG: CPT | Performed by: NURSE PRACTITIONER

## 2025-04-30 ASSESSMENT — ENCOUNTER SYMPTOMS: SHORTNESS OF BREATH: 0

## 2025-04-30 NOTE — PATIENT INSTRUCTIONS
Thank you for allowing us to care for you today!   We want to ensure we can follow your treatment plan and we strive to give you the best outcomes and experience possible.   If you ever have a life threatening emergency and call 911 - for an ambulance (EMS)  REMEMBER  Our providers can only care for you at:   [facility] or [facility]   Even if you have someone take you or you drive yourself we can only care for you in a [facility] facility. Our providers are not setup at the other healthcare locations!    PLEASE CALL OUR OFFICE DURING NORMAL BUSINESS HOURS  Monday through Friday 8 am to 5 pm  AFTER HOURS the physician on-call cannot help with scheduling, rescheduling, procedure instruction questions or any type of medication need or issue.  [facility] P:[phone] - [facility] P:[phone] - [facility] P:[phone]      If you receive a survey:  We would appreciate you taking the time to share your experience concerning your provider visit in the office.    These surveys are confidential!  We are eager to improve and are counting on you to share your feedback so we can ensure you get the best care possible.

## 2025-04-30 NOTE — PROGRESS NOTES
CLINICAL STAFF DOCUMENTATION    Balta Almeida, CONSUELO     Tip Barton  1945  [id number]    Have you had any Chest Pain recently? - No  Have you had any Shortness of Breath - No  Have you had any dizziness - No  Have you had any palpitations recently? - No  Do you have any edema - swelling in No      When did you have your last labs drawn 4/25  What doctor ordered [place]  Do we have the labs in their chart Yes  If we do not have the labs, ask where they were drawn Hospital other than [facility]    Do you need any prescriptions refilled? - No    Do you have a surgery or procedure scheduled in the near future - Yes  If Yes- DO EKG  If Yes - Who is the surgery with? Claudine  Phone number of physician [phone]  Fax number of physician [phone]  Type of surgery Watchman  GIVE THIS INFORMATION TO WAQAR JAEGER AND YVONNE LEDESMA    Do use tobacco products? - No  Do you drink alcohol? - Yes - 1 beer weekly  Do you use any illicit drugs? - No  Caffeine? - No  How much caffeine?     Check medication list thoroughly!!! AND RECONCILE OUTSIDE MEDICATIONS  If dose has changed change the entire order not just the MG  BE SURE TO ASK PATIENT IF THEY NEED MEDICATION REFILLS  Verify Pharmacy and update if incorrect

## 2025-04-30 NOTE — PROGRESS NOTES
[place] [address]  Phone: [phone]    Fax [phone]    Christelle Mcgrath MD, [facility]  Miguelangel Arroyo MD, [facility]   Cruz Villa MD, [facility] MD Maggi Carvalho MD, [facility]  Filemon Chow MD, [facility]    Yady Cuevas MD, [facility]   Carly Lancaster, APRN  Mirta Gonzalez, APRN  Fanny Chow, APRN  Balta Almeida, APRN    Cardiology Progress Note      4/30/2025    RE: Tip Barton  (1945)                             Primary cardiologist: Dr. Italia Carr       Subjective:  CC:   1. PAF (paroxysmal atrial fibrillation) (HCC)    2. Essential hypertension    3. Mixed hyperlipidemia          HPI: Tip Barton, who is a  80 y.o. year old male with a past medical history as listed below.  Patient presents to the office for follow up on PAF, HTN, and hyperlipidemia.  Patient had DILMA/DCCV 7/28/2022 which successfully cardioverted him to sinus rhythm.  He recently was evaluated by Dr. Chow for possible Watchman device placement.  Patient denies any chest pain, shortness of breath, dizziness, syncope, or palpitations.    Past Medical History:   Diagnosis Date    Chronic cough 11/06/2019    COPD, mild (HCC) 12/09/2019    Follows with Dr. Rodney    Dyspnea on exertion 11/06/2019    Essential hypertension     Follows with PCP    Former smoker 11/06/2019    Gastroesophageal reflux disease without esophagitis 11/06/2019    Graves' disease in remission     History of colon polyps     Hx of cardiovascular stress test 07/22/2022    Normal study    Hyperlipidemia     Mild persistent asthma without complication 06/08/2020    Postnasal drip 11/06/2019    Secondary hypothyroidism     4/3/1995- HENRY 123; 4/7/1995-HENRY 131       Current Outpatient Medications   Medication Sig Dispense Refill    mycophenolate (CELLCEPT) 500 MG tablet Take 3 tablets by mouth 2 times daily      Cholecalciferol (VITAMIN D3) 50 MCG (2000 UT) CAPS Take by mouth      atenolol

## 2025-05-05 ENCOUNTER — TELEPHONE (OUTPATIENT)
Dept: CARDIOLOGY CLINIC | Age: 80
End: 2025-05-05

## 2025-05-05 RX ORDER — ATENOLOL 25 MG/1
12.5 TABLET ORAL DAILY
Qty: 45 TABLET | Refills: 1 | Status: SHIPPED | OUTPATIENT
Start: 2025-05-05

## 2025-05-05 RX ORDER — ATENOLOL 25 MG/1
12.5 TABLET ORAL DAILY
Qty: 30 TABLET | Refills: 3 | Status: CANCELLED | OUTPATIENT
Start: 2025-05-05

## 2025-05-07 ENCOUNTER — TELEPHONE (OUTPATIENT)
Dept: CARDIOLOGY CLINIC | Age: 80
End: 2025-05-07

## 2025-05-07 DIAGNOSIS — I48.0 PAF (PAROXYSMAL ATRIAL FIBRILLATION) (HCC): Primary | ICD-10-CM

## 2025-05-07 DIAGNOSIS — I48.0 PAROXYSMAL ATRIAL FIBRILLATION (HCC): ICD-10-CM

## 2025-05-07 NOTE — TELEPHONE ENCOUNTER
Placed call to patient to schedule Watchman procedure. Left VM requesting return call.    Placed second call and patient scheduled for Watchman procedure on 6/13 at 7 a.m.

## 2025-05-08 PROBLEM — I48.0 PAROXYSMAL ATRIAL FIBRILLATION (HCC): Status: ACTIVE | Noted: 2025-05-07

## 2025-05-08 NOTE — TELEPHONE ENCOUNTER
*This consent is applicable for 30 days following patient signature*    PROCEDURAL INFORMED CONSENT FOR OPERATION / PROCEDURE    I (We), Tip Barton authorize, Dr. Robby Chow    and such assistants as may be selected by him/her, to perform the following operation/procedures:  Watchman Implant with transesophageal echocardiogram     Note: If unable to obtain consent prior to an emergent procedure, document the emergent reason in the medical record.   This procedure has been explained to my (our) satisfaction and included in the explanation was:   A) the intended benefit, nature, and extent of the procedure to be performed;   B) the significant risks involved and the probability of success;   C) alternative procedures and methods of treatment;   D) the dangers and probable consequences of such alternatives (including no procedure or treatment);   E) the expected consequences of the procedure on my future health;   F) whether other qualified individuals would be performing important surgical tasks and / or whether  would be present to advise or support the procedure.     I (we) understand that there are other risks of infection and other serious complications in the pre-operative/procedural and postoperative/procedural stages of my (our) care.   I (we) have asked all of the questions which I (we) thought were important in deciding whether or not to undergo treatment or diagnosis. These questions have been answered to my (our) satisfaction.   I (we) understand that no assurance can be given that the procedure will be a success, and no guarantee or warranty of success has been given to me (us).   2. It has been explained to me (us) that during the course of the operation/procedure, unforeseen conditions may be revealed that necessitate extension of the original procedure(s) or different procedure(s) than those set forth in Paragraph 1. I (we) authorize and request that the

## 2025-05-14 ENCOUNTER — TELEPHONE (OUTPATIENT)
Age: 80
End: 2025-05-14

## 2025-05-14 ENCOUNTER — TELEPHONE (OUTPATIENT)
Dept: CARDIOLOGY CLINIC | Age: 80
End: 2025-05-14

## 2025-05-14 NOTE — TELEPHONE ENCOUNTER
Patient called me regarding Watchman procedure.  Ok to move procedure up to Friday 5/16/2025 at 1100 with arrival of 0900.  Rose notified. He has appointment for PPW tomorrow along with SD with Dr Villa.  Will follow up.    Electronically signed by Carly Tyson RN on 5/14/2025 at 5:12 PM Watchman Care Coordinator

## 2025-05-15 ENCOUNTER — HOSPITAL ENCOUNTER (OUTPATIENT)
Dept: GENERAL RADIOLOGY | Age: 80
Discharge: HOME | End: 2025-05-15
Payer: MEDICARE

## 2025-05-15 ENCOUNTER — ANESTHESIA EVENT (OUTPATIENT)
Age: 80
End: 2025-05-15
Payer: MEDICARE

## 2025-05-15 ENCOUNTER — HOSPITAL ENCOUNTER (OUTPATIENT)
Age: 80
Discharge: HOME | End: 2025-05-15
Payer: MEDICARE

## 2025-05-15 ENCOUNTER — TELEPHONE (OUTPATIENT)
Age: 80
End: 2025-05-15

## 2025-05-15 ENCOUNTER — OFFICE VISIT (OUTPATIENT)
Dept: CARDIOLOGY CLINIC | Age: 80
End: 2025-05-15
Payer: MEDICARE

## 2025-05-15 VITALS
HEART RATE: 61 BPM | SYSTOLIC BLOOD PRESSURE: 110 MMHG | DIASTOLIC BLOOD PRESSURE: 70 MMHG | OXYGEN SATURATION: 93 % | HEIGHT: 72 IN | BODY MASS INDEX: 26.3 KG/M2 | WEIGHT: 194.2 LBS

## 2025-05-15 DIAGNOSIS — I10 ESSENTIAL HYPERTENSION: ICD-10-CM

## 2025-05-15 DIAGNOSIS — I48.0 PAF (PAROXYSMAL ATRIAL FIBRILLATION) (HCC): Primary | ICD-10-CM

## 2025-05-15 DIAGNOSIS — I48.0 PAF (PAROXYSMAL ATRIAL FIBRILLATION) (HCC): ICD-10-CM

## 2025-05-15 LAB
ABO/RH: NORMAL
ANION GAP SERPL CALCULATED.3IONS-SCNC: 11 MMOL/L (ref 9–17)
ANTIBODY SCREEN: NEGATIVE
BLOOD BANK COMMENT: NORMAL
BLOOD BANK DISPENSE STATUS: NORMAL
BLOOD BANK SAMPLE EXPIRATION: NORMAL
BPU ID: NORMAL
BUN SERPL-MCNC: 17 MG/DL (ref 7–20)
CALCIUM SERPL-MCNC: 9.1 MG/DL (ref 8.3–10.6)
CHLORIDE SERPL-SCNC: 103 MMOL/L (ref 99–110)
CO2 SERPL-SCNC: 23 MMOL/L (ref 21–32)
COMPONENT: NORMAL
CREAT SERPL-MCNC: 1 MG/DL (ref 0.8–1.3)
CROSSMATCH RESULT: NORMAL
ERYTHROCYTE [DISTWIDTH] IN BLOOD BY AUTOMATED COUNT: 13.2 % (ref 11.7–14.9)
GFR, ESTIMATED: 71 ML/MIN/1.73M2
GLUCOSE SERPL-MCNC: 107 MG/DL (ref 74–99)
HCT VFR BLD AUTO: 48.1 % (ref 42–52)
HGB BLD-MCNC: 16.1 G/DL (ref 13.5–18)
MAGNESIUM SERPL-MCNC: 2.3 MG/DL (ref 1.8–2.4)
MCH RBC QN AUTO: 29.9 PG (ref 27–31)
MCHC RBC AUTO-ENTMCNC: 33.5 G/DL (ref 32–36)
MCV RBC AUTO: 89.4 FL (ref 78–100)
PHOSPHATE SERPL-MCNC: 3.4 MG/DL (ref 2.5–4.9)
PLATELET # BLD AUTO: 225 K/UL (ref 140–440)
PMV BLD AUTO: 9.7 FL (ref 7.5–11.1)
POTASSIUM SERPL-SCNC: 3.9 MMOL/L (ref 3.5–5.1)
RBC # BLD AUTO: 5.38 M/UL (ref 4.6–6.2)
SODIUM SERPL-SCNC: 137 MMOL/L (ref 136–145)
TRANSFUSION STATUS: NORMAL
UNIT DIVISION: 0
UNIT TAG COMMENT: NORMAL
WBC OTHER # BLD: 7.3 K/UL (ref 4–10.5)

## 2025-05-15 PROCEDURE — 80048 BASIC METABOLIC PNL TOTAL CA: CPT

## 2025-05-15 PROCEDURE — G8417 CALC BMI ABV UP PARAM F/U: HCPCS | Performed by: INTERNAL MEDICINE

## 2025-05-15 PROCEDURE — 86900 BLOOD TYPING SEROLOGIC ABO: CPT

## 2025-05-15 PROCEDURE — 86901 BLOOD TYPING SEROLOGIC RH(D): CPT

## 2025-05-15 PROCEDURE — 1036F TOBACCO NON-USER: CPT | Performed by: INTERNAL MEDICINE

## 2025-05-15 PROCEDURE — 83735 ASSAY OF MAGNESIUM: CPT

## 2025-05-15 PROCEDURE — 85027 COMPLETE CBC AUTOMATED: CPT

## 2025-05-15 PROCEDURE — 71046 X-RAY EXAM CHEST 2 VIEWS: CPT

## 2025-05-15 PROCEDURE — 36415 COLL VENOUS BLD VENIPUNCTURE: CPT

## 2025-05-15 PROCEDURE — 3074F SYST BP LT 130 MM HG: CPT | Performed by: INTERNAL MEDICINE

## 2025-05-15 PROCEDURE — G8427 DOCREV CUR MEDS BY ELIG CLIN: HCPCS | Performed by: INTERNAL MEDICINE

## 2025-05-15 PROCEDURE — 1123F ACP DISCUSS/DSCN MKR DOCD: CPT | Performed by: INTERNAL MEDICINE

## 2025-05-15 PROCEDURE — 84100 ASSAY OF PHOSPHORUS: CPT

## 2025-05-15 PROCEDURE — 1159F MED LIST DOCD IN RCRD: CPT | Performed by: INTERNAL MEDICINE

## 2025-05-15 PROCEDURE — 99214 OFFICE O/P EST MOD 30 MIN: CPT | Performed by: INTERNAL MEDICINE

## 2025-05-15 PROCEDURE — 3078F DIAST BP <80 MM HG: CPT | Performed by: INTERNAL MEDICINE

## 2025-05-15 PROCEDURE — 86850 RBC ANTIBODY SCREEN: CPT

## 2025-05-15 PROCEDURE — 86920 COMPATIBILITY TEST SPIN: CPT

## 2025-05-15 NOTE — TELEPHONE ENCOUNTER
Osmin pre call done    Insurance approval obtained:  [x] Yes     [] No    Shared Decision  Date obtained: 5/15/2025  Physician: Dr ROCIO Villa  [x] Office     [] Media tab    [x] Discussed discharge instructions    [x] Discussed Incentive Spirometer    [x] Follow ups made (1 week, 6 months, 1 year)    Patient verbalizes understanding and all questions verbalized answered.  Will follow.  Electronically signed by Carly Tyson RN on 5/15/2025 at 5:25 PM [facility] Care Coordinator

## 2025-05-15 NOTE — PROGRESS NOTES
Tip Barton is being referred for Left Atrial Appendage Closure with WATCHMAN device for management of stroke risk resulting from non-valvular atrial fibrillation. Based on their past history, it has been determined that they are poor candidates for long-term oral-anticoagulation, however may be tolerant of short term treatment with anti-thrombotic therapy as necessary.  Records are reviewed in detail and discussed with patient.  80-year-old male has history of paroxysmal atrial fibrillation and interstitial lung disease with chronic cough has been on Eliquis with excessive bruising.  Denies any internal bleeding, anemia or blood transfusions or recurrent falls.  He likes to get off of long-term anticoagulation and has been offered left atrial appendage occlusion with Watchman as an option.  He tells me he is scheduled for tomorrow and he is accompanied by his wife today to the office.  Seems to be well-informed with regard to the procedure.    Past medical history:  has a past medical history of Chronic cough, COPD, mild (HCC), Dyspnea on exertion, Essential hypertension, Former smoker, Gastroesophageal reflux disease without esophagitis, Graves' disease in remission, History of colon polyps, Hx of cardiovascular stress test, Hyperlipidemia, Mild persistent asthma without complication, Postnasal drip, and Secondary hypothyroidism.  Past surgical history:  has a past surgical history that includes Colonoscopy (Last: ); Tonsillectomy ('s); and Colonoscopy (N/A, 2021).  Social History:   Social History     Tobacco Use    Smoking status: Former     Current packs/day: 0.00     Average packs/day: 1 pack/day for 20.0 years (20.0 ttl pk-yrs)     Types: Cigarettes     Start date: 1974     Quit date: 1994     Years since quittin.9    Smokeless tobacco: Never   Substance Use Topics    Alcohol use: Yes     Comment: occ. once a week/Caffiene - Avoids it     Family history: family history includes

## 2025-05-15 NOTE — TELEPHONE ENCOUNTER
Patient was here in office & educated on Watchman  for Dx: afib.  Procedure is scheduled for 5/16/2025 @ 11, w/arrival @ 9, @ [facility]. Pre-admission orders were given to patient for labs & CXR, which are due 5/15/2025 @ [facility].       Procedure and risks were explained to patient. Consent forms were signed.  Instructions were given to patient to remain NPO after midnight the night before procedure.  Patient may take morning meds the morning of procedure with small amount of water.     Patient was notified that procedure could be delayed due to an emergency. Patient voiced understanding. Copies of consent, pre-testing orders, & instructions scanned into media

## 2025-05-15 NOTE — ASSESSMENT & PLAN NOTE
History of PAF with no recent recurrence status post cardioversion in 22.  Expressing desire to come off of anticoagulation therapy due to easy bruising and bleeding and has frequent cough has interstitial lung disease.  CHADS2 vascular score is 3 and has bled score is 2.  He has opted to have left atrial appendage occlusion done to avoid long-term anticoagulation therapy and related cost and side effects.

## 2025-05-16 ENCOUNTER — APPOINTMENT (OUTPATIENT)
Dept: NON INVASIVE DIAGNOSTICS | Age: 80
DRG: 228 | End: 2025-05-16
Attending: INTERNAL MEDICINE
Payer: MEDICARE

## 2025-05-16 ENCOUNTER — HOSPITAL ENCOUNTER (INPATIENT)
Age: 80
LOS: 4 days | Discharge: HOME | DRG: 228 | End: 2025-05-20
Attending: INTERNAL MEDICINE | Admitting: INTERNAL MEDICINE
Payer: MEDICARE

## 2025-05-16 ENCOUNTER — ANESTHESIA (OUTPATIENT)
Age: 80
End: 2025-05-16
Payer: MEDICARE

## 2025-05-16 DIAGNOSIS — I48.0 PAROXYSMAL ATRIAL FIBRILLATION (HCC): ICD-10-CM

## 2025-05-16 PROBLEM — I48.91 A-FIB (HCC): Status: ACTIVE | Noted: 2025-05-16

## 2025-05-16 LAB
ECHO BSA: 2.06 M2
ECHO EST RA PRESSURE: 3 MMHG
ECHO LV EDV A4C: 73 ML
ECHO LV EDV INDEX A4C: 35 ML/M2
ECHO LV EF PHYSICIAN: 50 %
ECHO LV EF PHYSICIAN: 55 %
ECHO LV EJECTION FRACTION A4C: 53 %
ECHO LV ESV A4C: 34 ML
ECHO LV ESV INDEX A4C: 17 ML/M2
ECHO LV FRACTIONAL SHORTENING: 31 % (ref 28–44)
ECHO LV INTERNAL DIMENSION DIASTOLE INDEX: 2.18 CM/M2
ECHO LV INTERNAL DIMENSION DIASTOLIC: 4.5 CM (ref 4.2–5.9)
ECHO LV INTERNAL DIMENSION SYSTOLIC INDEX: 1.5 CM/M2
ECHO LV INTERNAL DIMENSION SYSTOLIC: 3.1 CM
ECHO LV IVSD: 0.7 CM (ref 0.6–1)
ECHO LV MASS 2D: 113.6 G (ref 88–224)
ECHO LV MASS INDEX 2D: 55.2 G/M2 (ref 49–115)
ECHO LV POSTERIOR WALL DIASTOLIC: 0.9 CM (ref 0.6–1)
ECHO LV RELATIVE WALL THICKNESS RATIO: 0.4
ECHO RIGHT VENTRICULAR SYSTOLIC PRESSURE (RVSP): 26 MMHG
ECHO TV REGURGITANT MAX VELOCITY: 2.38 M/S
ECHO TV REGURGITANT PEAK GRADIENT: 23 MMHG

## 2025-05-16 PROCEDURE — 02PA3DZ REMOVAL OF INTRALUMINAL DEVICE FROM HEART, PERCUTANEOUS APPROACH: ICD-10-PCS | Performed by: INTERNAL MEDICINE

## 2025-05-16 PROCEDURE — B24BZZ4 ULTRASONOGRAPHY OF HEART WITH AORTA, TRANSESOPHAGEAL: ICD-10-PCS | Performed by: INTERNAL MEDICINE

## 2025-05-16 PROCEDURE — 6360000002 HC RX W HCPCS: Performed by: NURSE ANESTHETIST, CERTIFIED REGISTERED

## 2025-05-16 PROCEDURE — 6370000000 HC RX 637 (ALT 250 FOR IP): Performed by: NURSE ANESTHETIST, CERTIFIED REGISTERED

## 2025-05-16 PROCEDURE — 2060000000 HC ICU INTERMEDIATE R&B

## 2025-05-16 PROCEDURE — 33340 PERQ CLSR TCAT L ATR APNDGE: CPT | Performed by: INTERNAL MEDICINE

## 2025-05-16 PROCEDURE — 6370000000 HC RX 637 (ALT 250 FOR IP): Performed by: INTERNAL MEDICINE

## 2025-05-16 PROCEDURE — 2720000010 HC SURG SUPPLY STERILE: Performed by: INTERNAL MEDICINE

## 2025-05-16 PROCEDURE — 93312 ECHO TRANSESOPHAGEAL: CPT | Performed by: INTERNAL MEDICINE

## 2025-05-16 PROCEDURE — 85347 COAGULATION TIME ACTIVATED: CPT

## 2025-05-16 PROCEDURE — 93325 DOPPLER ECHO COLOR FLOW MAPG: CPT

## 2025-05-16 PROCEDURE — 3700000001 HC ADD 15 MINUTES (ANESTHESIA): Performed by: INTERNAL MEDICINE

## 2025-05-16 PROCEDURE — 85347 COAGULATION TIME ACTIVATED: CPT | Performed by: INTERNAL MEDICINE

## 2025-05-16 PROCEDURE — 6360000004 HC RX CONTRAST MEDICATION: Performed by: INTERNAL MEDICINE

## 2025-05-16 PROCEDURE — 2709999900 HC NON-CHARGEABLE SUPPLY: Performed by: INTERNAL MEDICINE

## 2025-05-16 PROCEDURE — 93325 DOPPLER ECHO COLOR FLOW MAPG: CPT | Performed by: INTERNAL MEDICINE

## 2025-05-16 PROCEDURE — 93308 TTE F-UP OR LMTD: CPT

## 2025-05-16 PROCEDURE — 94761 N-INVAS EAR/PLS OXIMETRY MLT: CPT

## 2025-05-16 PROCEDURE — 7100000000 HC PACU RECOVERY - FIRST 15 MIN: Performed by: INTERNAL MEDICINE

## 2025-05-16 PROCEDURE — 3700000000 HC ANESTHESIA ATTENDED CARE: Performed by: INTERNAL MEDICINE

## 2025-05-16 PROCEDURE — C1760 CLOSURE DEV, VASC: HCPCS | Performed by: INTERNAL MEDICINE

## 2025-05-16 PROCEDURE — 7100000001 HC PACU RECOVERY - ADDTL 15 MIN: Performed by: INTERNAL MEDICINE

## 2025-05-16 PROCEDURE — 2500000003 HC RX 250 WO HCPCS: Performed by: NURSE ANESTHETIST, CERTIFIED REGISTERED

## 2025-05-16 PROCEDURE — 02H73DZ INSERTION OF INTRALUMINAL DEVICE INTO LEFT ATRIUM, PERCUTANEOUS APPROACH: ICD-10-PCS | Performed by: INTERNAL MEDICINE

## 2025-05-16 PROCEDURE — C1769 GUIDE WIRE: HCPCS | Performed by: INTERNAL MEDICINE

## 2025-05-16 PROCEDURE — 93320 DOPPLER ECHO COMPLETE: CPT | Performed by: INTERNAL MEDICINE

## 2025-05-16 PROCEDURE — C1894 INTRO/SHEATH, NON-LASER: HCPCS | Performed by: INTERNAL MEDICINE

## 2025-05-16 PROCEDURE — 93321 DOPPLER ECHO F-UP/LMTD STD: CPT | Performed by: INTERNAL MEDICINE

## 2025-05-16 PROCEDURE — C1889 IMPLANT/INSERT DEVICE, NOC: HCPCS | Performed by: INTERNAL MEDICINE

## 2025-05-16 PROCEDURE — 93005 ELECTROCARDIOGRAM TRACING: CPT | Performed by: INTERNAL MEDICINE

## 2025-05-16 PROCEDURE — 2500000003 HC RX 250 WO HCPCS: Performed by: INTERNAL MEDICINE

## 2025-05-16 PROCEDURE — 93308 TTE F-UP OR LMTD: CPT | Performed by: INTERNAL MEDICINE

## 2025-05-16 PROCEDURE — 2580000003 HC RX 258: Performed by: INTERNAL MEDICINE

## 2025-05-16 DEVICE — LEFT ATRIAL APPENDAGE CLOSURE DEVICE WITH DELIVERY SYSTEM
Type: IMPLANTABLE DEVICE | Status: FUNCTIONAL
Brand: WATCHMAN FLX™ PRO

## 2025-05-16 RX ORDER — ASPIRIN 325 MG
325 TABLET ORAL DAILY
Status: DISCONTINUED | OUTPATIENT
Start: 2025-05-16 | End: 2025-05-17

## 2025-05-16 RX ORDER — SODIUM CHLORIDE 0.9 % (FLUSH) 0.9 %
5-40 SYRINGE (ML) INJECTION PRN
Status: DISCONTINUED | OUTPATIENT
Start: 2025-05-16 | End: 2025-05-16 | Stop reason: HOSPADM

## 2025-05-16 RX ORDER — LIDOCAINE HYDROCHLORIDE 20 MG/ML
INJECTION, SOLUTION EPIDURAL; INFILTRATION; INTRACAUDAL; PERINEURAL
Status: DISCONTINUED | OUTPATIENT
Start: 2025-05-16 | End: 2025-05-16 | Stop reason: SDUPTHER

## 2025-05-16 RX ORDER — SODIUM CHLORIDE 9 MG/ML
INJECTION, SOLUTION INTRAVENOUS PRN
Status: DISCONTINUED | OUTPATIENT
Start: 2025-05-16 | End: 2025-05-16 | Stop reason: HOSPADM

## 2025-05-16 RX ORDER — PROTAMINE SULFATE 10 MG/ML
INJECTION, SOLUTION INTRAVENOUS
Status: DISCONTINUED | OUTPATIENT
Start: 2025-05-16 | End: 2025-05-16 | Stop reason: SDUPTHER

## 2025-05-16 RX ORDER — MYCOPHENOLATE MOFETIL 500 MG/1
1500 TABLET ORAL 2 TIMES DAILY
Status: COMPLETED | OUTPATIENT
Start: 2025-05-16 | End: 2025-05-16

## 2025-05-16 RX ORDER — OXYCODONE HYDROCHLORIDE 5 MG/1
5 TABLET ORAL PRN
Status: DISCONTINUED | OUTPATIENT
Start: 2025-05-16 | End: 2025-05-16 | Stop reason: HOSPADM

## 2025-05-16 RX ORDER — LEVOTHYROXINE SODIUM 150 UG/1
150 TABLET ORAL DAILY
Status: DISCONTINUED | OUTPATIENT
Start: 2025-05-17 | End: 2025-05-20 | Stop reason: HOSPADM

## 2025-05-16 RX ORDER — DEXAMETHASONE SODIUM PHOSPHATE 4 MG/ML
INJECTION, SOLUTION INTRA-ARTICULAR; INTRALESIONAL; INTRAMUSCULAR; INTRAVENOUS; SOFT TISSUE
Status: DISCONTINUED | OUTPATIENT
Start: 2025-05-16 | End: 2025-05-16 | Stop reason: SDUPTHER

## 2025-05-16 RX ORDER — ATORVASTATIN CALCIUM 40 MG/1
40 TABLET, FILM COATED ORAL DAILY
Status: DISCONTINUED | OUTPATIENT
Start: 2025-05-17 | End: 2025-05-20 | Stop reason: HOSPADM

## 2025-05-16 RX ORDER — ACETAMINOPHEN 325 MG/1
650 TABLET ORAL EVERY 4 HOURS PRN
Status: DISCONTINUED | OUTPATIENT
Start: 2025-05-16 | End: 2025-05-20 | Stop reason: HOSPADM

## 2025-05-16 RX ORDER — LIDOCAINE HYDROCHLORIDE 40 MG/ML
SOLUTION TOPICAL
Status: DISCONTINUED | OUTPATIENT
Start: 2025-05-16 | End: 2025-05-16 | Stop reason: SDUPTHER

## 2025-05-16 RX ORDER — VITAMIN B COMPLEX
2000 TABLET ORAL DAILY
Status: DISCONTINUED | OUTPATIENT
Start: 2025-05-16 | End: 2025-05-20 | Stop reason: HOSPADM

## 2025-05-16 RX ORDER — FENTANYL CITRATE 50 UG/ML
25 INJECTION, SOLUTION INTRAMUSCULAR; INTRAVENOUS EVERY 5 MIN PRN
Status: DISCONTINUED | OUTPATIENT
Start: 2025-05-16 | End: 2025-05-16 | Stop reason: HOSPADM

## 2025-05-16 RX ORDER — SODIUM CHLORIDE 0.9 % (FLUSH) 0.9 %
5-40 SYRINGE (ML) INJECTION EVERY 12 HOURS SCHEDULED
Status: DISCONTINUED | OUTPATIENT
Start: 2025-05-16 | End: 2025-05-16

## 2025-05-16 RX ORDER — SODIUM CHLORIDE 0.9 % (FLUSH) 0.9 %
5-40 SYRINGE (ML) INJECTION EVERY 12 HOURS SCHEDULED
Status: DISCONTINUED | OUTPATIENT
Start: 2025-05-16 | End: 2025-05-20 | Stop reason: HOSPADM

## 2025-05-16 RX ORDER — ONDANSETRON 2 MG/ML
4 INJECTION INTRAMUSCULAR; INTRAVENOUS
Status: DISCONTINUED | OUTPATIENT
Start: 2025-05-16 | End: 2025-05-16 | Stop reason: HOSPADM

## 2025-05-16 RX ORDER — PHENYLEPHRINE HCL IN 0.9% NACL 1 MG/10 ML
SYRINGE (ML) INTRAVENOUS
Status: DISCONTINUED | OUTPATIENT
Start: 2025-05-16 | End: 2025-05-16 | Stop reason: SDUPTHER

## 2025-05-16 RX ORDER — MYCOPHENOLATE MOFETIL 250 MG/1
1500 CAPSULE ORAL 2 TIMES DAILY
Status: DISCONTINUED | OUTPATIENT
Start: 2025-05-17 | End: 2025-05-20 | Stop reason: HOSPADM

## 2025-05-16 RX ORDER — MYCOPHENOLATE MOFETIL 250 MG/1
1500 CAPSULE ORAL 2 TIMES DAILY
Status: DISCONTINUED | OUTPATIENT
Start: 2025-05-16 | End: 2025-05-16

## 2025-05-16 RX ORDER — SODIUM CHLORIDE 0.9 % (FLUSH) 0.9 %
5-40 SYRINGE (ML) INJECTION EVERY 12 HOURS SCHEDULED
Status: DISCONTINUED | OUTPATIENT
Start: 2025-05-16 | End: 2025-05-16 | Stop reason: HOSPADM

## 2025-05-16 RX ORDER — CEFAZOLIN SODIUM 1 G/3ML
INJECTION, POWDER, FOR SOLUTION INTRAMUSCULAR; INTRAVENOUS
Status: DISCONTINUED | OUTPATIENT
Start: 2025-05-16 | End: 2025-05-16 | Stop reason: SDUPTHER

## 2025-05-16 RX ORDER — SODIUM CHLORIDE 9 MG/ML
INJECTION, SOLUTION INTRAVENOUS PRN
Status: DISCONTINUED | OUTPATIENT
Start: 2025-05-16 | End: 2025-05-16

## 2025-05-16 RX ORDER — ROCURONIUM BROMIDE 10 MG/ML
INJECTION, SOLUTION INTRAVENOUS
Status: DISCONTINUED | OUTPATIENT
Start: 2025-05-16 | End: 2025-05-16 | Stop reason: SDUPTHER

## 2025-05-16 RX ORDER — GLYCOPYRROLATE 0.2 MG/ML
INJECTION INTRAMUSCULAR; INTRAVENOUS
Status: DISCONTINUED | OUTPATIENT
Start: 2025-05-16 | End: 2025-05-16 | Stop reason: SDUPTHER

## 2025-05-16 RX ORDER — AMLODIPINE BESYLATE 10 MG/1
10 TABLET ORAL DAILY
Status: DISCONTINUED | OUTPATIENT
Start: 2025-05-16 | End: 2025-05-20 | Stop reason: HOSPADM

## 2025-05-16 RX ORDER — SODIUM CHLORIDE 9 MG/ML
INJECTION, SOLUTION INTRAVENOUS PRN
Status: DISCONTINUED | OUTPATIENT
Start: 2025-05-16 | End: 2025-05-20 | Stop reason: HOSPADM

## 2025-05-16 RX ORDER — IOPAMIDOL 755 MG/ML
INJECTION, SOLUTION INTRAVASCULAR PRN
Status: DISCONTINUED | OUTPATIENT
Start: 2025-05-16 | End: 2025-05-16 | Stop reason: HOSPADM

## 2025-05-16 RX ORDER — SODIUM CHLORIDE, SODIUM LACTATE, POTASSIUM CHLORIDE, CALCIUM CHLORIDE 600; 310; 30; 20 MG/100ML; MG/100ML; MG/100ML; MG/100ML
INJECTION, SOLUTION INTRAVENOUS
Status: DISCONTINUED | OUTPATIENT
Start: 2025-05-16 | End: 2025-05-16 | Stop reason: SDUPTHER

## 2025-05-16 RX ORDER — ATENOLOL 25 MG/1
12.5 TABLET ORAL DAILY
Status: DISCONTINUED | OUTPATIENT
Start: 2025-05-17 | End: 2025-05-20 | Stop reason: HOSPADM

## 2025-05-16 RX ORDER — OXYCODONE HYDROCHLORIDE 5 MG/1
10 TABLET ORAL PRN
Status: DISCONTINUED | OUTPATIENT
Start: 2025-05-16 | End: 2025-05-16 | Stop reason: HOSPADM

## 2025-05-16 RX ORDER — PROPOFOL 10 MG/ML
INJECTION, EMULSION INTRAVENOUS
Status: DISCONTINUED | OUTPATIENT
Start: 2025-05-16 | End: 2025-05-16 | Stop reason: SDUPTHER

## 2025-05-16 RX ORDER — METOCLOPRAMIDE HYDROCHLORIDE 5 MG/ML
10 INJECTION INTRAMUSCULAR; INTRAVENOUS
Status: DISCONTINUED | OUTPATIENT
Start: 2025-05-16 | End: 2025-05-16 | Stop reason: HOSPADM

## 2025-05-16 RX ORDER — HEPARIN SODIUM 200 [USP'U]/100ML
INJECTION, SOLUTION INTRAVENOUS PRN
Status: DISCONTINUED | OUTPATIENT
Start: 2025-05-16 | End: 2025-05-16 | Stop reason: HOSPADM

## 2025-05-16 RX ORDER — HEPARIN SODIUM 1000 [USP'U]/ML
INJECTION, SOLUTION INTRAVENOUS; SUBCUTANEOUS
Status: DISCONTINUED | OUTPATIENT
Start: 2025-05-16 | End: 2025-05-16 | Stop reason: SDUPTHER

## 2025-05-16 RX ORDER — SODIUM CHLORIDE 9 MG/ML
INJECTION, SOLUTION INTRAVENOUS CONTINUOUS
Status: DISCONTINUED | OUTPATIENT
Start: 2025-05-16 | End: 2025-05-20 | Stop reason: HOSPADM

## 2025-05-16 RX ORDER — SODIUM CHLORIDE 0.9 % (FLUSH) 0.9 %
5-40 SYRINGE (ML) INJECTION PRN
Status: DISCONTINUED | OUTPATIENT
Start: 2025-05-16 | End: 2025-05-16

## 2025-05-16 RX ORDER — NALOXONE HYDROCHLORIDE 0.4 MG/ML
INJECTION, SOLUTION INTRAMUSCULAR; INTRAVENOUS; SUBCUTANEOUS PRN
Status: DISCONTINUED | OUTPATIENT
Start: 2025-05-16 | End: 2025-05-16

## 2025-05-16 RX ORDER — LABETALOL HYDROCHLORIDE 5 MG/ML
10 INJECTION, SOLUTION INTRAVENOUS
Status: DISCONTINUED | OUTPATIENT
Start: 2025-05-16 | End: 2025-05-16 | Stop reason: HOSPADM

## 2025-05-16 RX ORDER — ONDANSETRON 2 MG/ML
INJECTION INTRAMUSCULAR; INTRAVENOUS
Status: DISCONTINUED | OUTPATIENT
Start: 2025-05-16 | End: 2025-05-16 | Stop reason: SDUPTHER

## 2025-05-16 RX ORDER — SODIUM CHLORIDE 0.9 % (FLUSH) 0.9 %
5-40 SYRINGE (ML) INJECTION PRN
Status: DISCONTINUED | OUTPATIENT
Start: 2025-05-16 | End: 2025-05-20 | Stop reason: HOSPADM

## 2025-05-16 RX ADMIN — SUGAMMADEX 200 MG: 100 INJECTION, SOLUTION INTRAVENOUS at 12:14

## 2025-05-16 RX ADMIN — ASPIRIN 325 MG: 325 TABLET ORAL at 09:43

## 2025-05-16 RX ADMIN — SODIUM CHLORIDE, PRESERVATIVE FREE 10 ML: 5 INJECTION INTRAVENOUS at 21:10

## 2025-05-16 RX ADMIN — GLYCOPYRROLATE 0.2 MG: 0.2 INJECTION, SOLUTION INTRAMUSCULAR; INTRAVENOUS at 10:50

## 2025-05-16 RX ADMIN — DEXAMETHASONE SODIUM PHOSPHATE 4 MG: 4 INJECTION, SOLUTION INTRAMUSCULAR; INTRAVENOUS at 10:31

## 2025-05-16 RX ADMIN — ROCURONIUM BROMIDE 65 MG: 10 INJECTION INTRAVENOUS at 10:31

## 2025-05-16 RX ADMIN — HYDROMORPHONE HYDROCHLORIDE 0.5 MG: 1 INJECTION, SOLUTION INTRAMUSCULAR; INTRAVENOUS; SUBCUTANEOUS at 12:21

## 2025-05-16 RX ADMIN — Medication 150 MCG: at 11:37

## 2025-05-16 RX ADMIN — HEPARIN SODIUM 3000 UNITS: 1000 INJECTION, SOLUTION INTRAVENOUS; SUBCUTANEOUS at 11:35

## 2025-05-16 RX ADMIN — ROCURONIUM BROMIDE 15 MG: 10 INJECTION INTRAVENOUS at 11:05

## 2025-05-16 RX ADMIN — HEPARIN SODIUM 6000 UNITS: 1000 INJECTION, SOLUTION INTRAVENOUS; SUBCUTANEOUS at 11:23

## 2025-05-16 RX ADMIN — SODIUM CHLORIDE: 0.9 INJECTION, SOLUTION INTRAVENOUS at 14:04

## 2025-05-16 RX ADMIN — ONDANSETRON 4 MG: 2 INJECTION INTRAMUSCULAR; INTRAVENOUS at 10:31

## 2025-05-16 RX ADMIN — MYCOPHENOLATE MOFETIL 1500 MG: 500 TABLET ORAL at 21:02

## 2025-05-16 RX ADMIN — LIDOCAINE HYDROCHLORIDE 4 ML: 40 SOLUTION TOPICAL at 10:33

## 2025-05-16 RX ADMIN — PROTAMINE SULFATE 30 MG: 10 INJECTION, SOLUTION INTRAVENOUS at 12:10

## 2025-05-16 RX ADMIN — LIDOCAINE HYDROCHLORIDE 100 MG: 20 INJECTION, SOLUTION EPIDURAL; INFILTRATION; INTRACAUDAL; PERINEURAL at 10:31

## 2025-05-16 RX ADMIN — SODIUM CHLORIDE, SODIUM LACTATE, POTASSIUM CHLORIDE, CALCIUM CHLORIDE: 600; 310; 30; 20 INJECTION, SOLUTION INTRAVENOUS at 10:22

## 2025-05-16 RX ADMIN — AMLODIPINE BESYLATE 10 MG: 10 TABLET ORAL at 15:18

## 2025-05-16 RX ADMIN — Medication 2000 UNITS: at 15:18

## 2025-05-16 RX ADMIN — CEFAZOLIN 2 G: 1 INJECTION, POWDER, FOR SOLUTION INTRAMUSCULAR; INTRAVENOUS; PARENTERAL at 10:40

## 2025-05-16 RX ADMIN — PROPOFOL 100 MG: 10 INJECTION, EMULSION INTRAVENOUS at 10:31

## 2025-05-16 ASSESSMENT — COPD QUESTIONNAIRES: CAT_SEVERITY: MODERATE

## 2025-05-16 ASSESSMENT — ENCOUNTER SYMPTOMS: SHORTNESS OF BREATH: 1

## 2025-05-16 NOTE — PROGRESS NOTES
1230- pt received from Cath Lab. Monitors placed and alarms on. Report received from Tevin LEIGH. Pt alert and oriented x 4 upon arrival to PACU. Right groin site is clean, dry, and intact.  1245- pt resting comfortably. Denies any pain or nausea.  1250- pt provided urinal per request. Able to void approx 100ml of clear, yellow urine.  1258- report called to MANDY Richter RN prior to transport to inpatient room.  1306- pt transported to inpatient room by RN. Bedside check of right groin site done with Rober CABA. Right groin site bleeding after transfer to inpatient bed. Pressure held.

## 2025-05-16 NOTE — PROGRESS NOTES
Called to the room, staff nurse noted hematoma and bleeding from insertion site. Pressure held to insertion site, and bleeding under control. Hematoma significantly reduced. New transparent dressing applied to site. Bedside RN aware and floor charge RN aware.

## 2025-05-16 NOTE — PROGRESS NOTES
Dr Chow at bedside speaking with wife.  Wife aware of plan to be admitted and patient's room # 2010.  Belongings to lab 3 with patient.

## 2025-05-16 NOTE — ANESTHESIA POSTPROCEDURE EVALUATION
Department of Anesthesiology  Postprocedure Note    Patient: Tip Barton  MRN: [id number]  YOB: 1945  Date of evaluation: 5/16/2025    Procedure Summary       Date: 05/16/25 Room / Location: [facility] CATH LAB  / [facility] CARDI[facility] CATH LAB    Anesthesia Start: 1022 Anesthesia Stop: 1232    Procedure: Watchman jd closure device Diagnosis:       Paroxysmal atrial fibrillation (HCC)      (Paroxysmal atrial fibrillation (HCC) [I48.0])    Providers: Robby Michael MD Responsible Provider: Tobin Barbosa MD    Anesthesia Type: general ASA Status: 3            Anesthesia Type: No value filed.    Carito Phase I: Carito Score: 9    Carito Phase II:      Anesthesia Post Evaluation    Patient location during evaluation: bedside  Patient participation: complete - patient participated  Level of consciousness: awake  Airway patency: patent  Nausea & Vomiting: no nausea  Cardiovascular status: blood pressure returned to baseline  Respiratory status: acceptable  Hydration status: euvolemic  Pain management: adequate    No notable events documented.

## 2025-05-16 NOTE — PROGRESS NOTES
4 Eyes Skin Assessment     NAME:  Tip Barton  YOB: 1945  MEDICAL RECORD NUMBER:  [id number]    The patient is being assessed for  Admission    I agree that at least one RN has performed a thorough Head to Toe Skin Assessment on the patient. ALL assessment sites listed below have been assessed.      Areas assessed by both nurses:    Head, Face, Ears, Shoulders, Back, Chest, Arms, Elbows, Hands, Sacrum. Buttock, Coccyx, Ischium, Legs. Feet and Heels, and Under Medical Devices         Does the Patient have a Wound? Yes wound(s) were present on assessment. LDA wound assessment was Initiated and completed by RN right groin post cardiac cath       Min Prevention initiated by RN: No  Wound Care Orders initiated by RN: No    Pressure Injury (Stage 3,4, Unstageable, DTI, NWPT, and Complex wounds) if present, place Wound referral order by RN under : No    New Ostomies, if present place, Ostomy referral order under : No     Nurse 1 eSignature: Electronically signed by Rober Salter RN on 5/16/25 at 6:57 PM EDT    **SHARE this note so that the co-signing nurse can place an eSignature**    Nurse 2 eSignature: Electronically signed by Genna Valera RN on 5/16/25 at 6:59 PM EDT

## 2025-05-16 NOTE — H&P
Tip Barton, 80 y.o., male    Primary care physician:  Whitley Cespedes MD     Chief Complaint: afib    History of Present Illness:  Comes in for discussion for possibly getting watchman due to concerns for bleeding bruising a lot he has been on Eliquis due to history of atrial fibrillation  Normally sees Dr. Carr he has history of DILMA cardioversion back in July 2022 he denies any palpitations he has had 1 fall which he believes was a mechanical but he has been bruising   He has interstiatil lung disease he is on cell cept     Afib  Normally sees Dr. Carr he had a DILMA back in 2022 revealed that he has a very small left atrial appendage he has been bruising a lot and having difficulty tolerating Eliquis because he has been on CellCept due to interstitial lung disease he reports coughing  Patent discussion about watchman we will plan DILMA and to determine if he is a candidate for watchman  [id number] Score for Atrial Fibrillation Stroke Risk    Risk   Factors   Component Value   C CHF No 0   H HTN Yes 1   A2 Age >= 75 Yes,  (79 y.o.) 2   D DM No 0   S2 Prior Stroke/TIA No 0   V Vascular Disease No 0   A Age 65-74 No,  (79 y.o.) 0   Sc Sex male 0     [id number]  Score   3   Score last updated 3/24/25 10:02 AM EDT     Click here for a link to the UpToDate guideline \"Atrial Fibrillation: Anticoagulation therapy to prevent embolization     Disclaimer: Risk Score calculation is dependent on accuracy of patient problem list and past encounter diagnosis.               HAS-BLED Score                          Risk Factors      Points   Hypertension  Uncontrolled, >160 mmHg systolic    No=0   Renal disease  Dialysis, transplant, Cr>2.26 mg/dL or >200 µmol/L    No=0   Liver disease   Cirrhosis or bilirubin >2x normal with AST/ALT/AP >3x normal    No=0   Stroke history    No=0   Prior major bleeding or predisposition to bleeding     No=0   Labile  INR  Unstable/high INRs, time in therapeutic range <60%    No=0   Age

## 2025-05-16 NOTE — ANESTHESIA PRE PROCEDURE
Department of Anesthesiology  Preprocedure Note       Name:  Tip Barton   Age:  80 y.o.  :  1945                                          MRN:  [id number]         Date:  2025      Surgeon: Surgeon(s):  Robby Michael MD    Procedure: Procedure(s):  Watchman jd closure device    Medications prior to admission:   Prior to Admission medications    Medication Sig Start Date End Date Taking? Authorizing Provider   atenolol (TENORMIN) 25 MG tablet Take 0.5 tablets by mouth daily 25  Yes Balta Almeida APRN - CNP   mycophenolate (CELLCEPT) 500 MG tablet Take 3 tablets by mouth 2 times daily 24  Yes ProviderFrancis MD   Cholecalciferol (VITAMIN D3) 50 MCG (2000) CAPS Take by mouth   Yes Francis Baldwin MD   apixaban (ELIQUIS) 5 MG TABS tablet Take 1 tablet by mouth 2 times daily 7/11/24 10/4/25 Yes Balta Almeida APRN - CNP   simvastatin (ZOCOR) 20 MG tablet Take 1 tablet by mouth daily 22  Yes Daniella Franklin, DO   levothyroxine (SYNTHROID) 150 MCG tablet Take 1 tablet by mouth Daily (Alternate with Levothyroxine 137 mcg) 22  Yes Daniella Franklin DO   amLODIPine (NORVASC) 10 MG tablet Take 1 tablet by mouth daily 22  Yes Daniella Franklin, DO   Multiple Vitamin (MULTI-VITAMINS) TABS Take by mouth   Yes ProviderFrancis MD       Current medications:    Current Facility-Administered Medications   Medication Dose Route Frequency Provider Last Rate Last Admin    sodium chloride flush 0.9 % injection 5-40 mL  5-40 mL IntraVENous 2 times per day Sadiq Kirk MD        sodium chloride flush 0.9 % injection 5-40 mL  5-40 mL IntraVENous PRN Sadiq Kirk MD        0.9 % sodium chloride infusion   IntraVENous PRN Sadiq Kirk MD        0.9 % sodium chloride infusion   IntraVENous PRN Robby Michael MD           Allergies:    Allergies   Allergen Reactions    Pcn [Penicillins] Rash       Problem List:    Patient Active Problem List   Diagnosis

## 2025-05-17 ENCOUNTER — APPOINTMENT (OUTPATIENT)
Dept: CT IMAGING | Age: 80
DRG: 228 | End: 2025-05-17
Attending: INTERNAL MEDICINE
Payer: MEDICARE

## 2025-05-17 LAB
ANION GAP SERPL CALCULATED.3IONS-SCNC: 14 MMOL/L (ref 9–17)
BUN SERPL-MCNC: 18 MG/DL (ref 7–20)
CALCIUM SERPL-MCNC: 9.4 MG/DL (ref 8.3–10.6)
CHLORIDE SERPL-SCNC: 103 MMOL/L (ref 99–110)
CO2 SERPL-SCNC: 19 MMOL/L (ref 21–32)
CREAT SERPL-MCNC: 1.4 MG/DL (ref 0.8–1.3)
EKG ATRIAL RATE: 60 BPM
EKG DIAGNOSIS: NORMAL
EKG P AXIS: 47 DEGREES
EKG P-R INTERVAL: 180 MS
EKG Q-T INTERVAL: 418 MS
EKG QRS DURATION: 104 MS
EKG QTC CALCULATION (BAZETT): 418 MS
EKG R AXIS: 28 DEGREES
EKG T AXIS: 32 DEGREES
EKG VENTRICULAR RATE: 60 BPM
ERYTHROCYTE [DISTWIDTH] IN BLOOD BY AUTOMATED COUNT: 13.2 % (ref 11.7–14.9)
GFR, ESTIMATED: 51 ML/MIN/1.73M2
GLUCOSE SERPL-MCNC: 154 MG/DL (ref 74–99)
HCT VFR BLD AUTO: 47.7 % (ref 42–52)
HGB BLD-MCNC: 15.5 G/DL (ref 13.5–18)
MCH RBC QN AUTO: 28.7 PG (ref 27–31)
MCHC RBC AUTO-ENTMCNC: 32.5 G/DL (ref 32–36)
MCV RBC AUTO: 88.3 FL (ref 78–100)
PLATELET # BLD AUTO: 216 K/UL (ref 140–440)
PMV BLD AUTO: 9.3 FL (ref 7.5–11.1)
POTASSIUM SERPL-SCNC: 3.9 MMOL/L (ref 3.5–5.1)
RBC # BLD AUTO: 5.4 M/UL (ref 4.6–6.2)
SODIUM SERPL-SCNC: 137 MMOL/L (ref 136–145)
WBC OTHER # BLD: 15.5 K/UL (ref 4–10.5)

## 2025-05-17 PROCEDURE — 2500000003 HC RX 250 WO HCPCS: Performed by: INTERNAL MEDICINE

## 2025-05-17 PROCEDURE — 6370000000 HC RX 637 (ALT 250 FOR IP): Performed by: INTERNAL MEDICINE

## 2025-05-17 PROCEDURE — 6360000002 HC RX W HCPCS: Performed by: INTERNAL MEDICINE

## 2025-05-17 PROCEDURE — 74176 CT ABD & PELVIS W/O CONTRAST: CPT

## 2025-05-17 PROCEDURE — 36415 COLL VENOUS BLD VENIPUNCTURE: CPT

## 2025-05-17 PROCEDURE — 93010 ELECTROCARDIOGRAM REPORT: CPT | Performed by: INTERNAL MEDICINE

## 2025-05-17 PROCEDURE — 1200000000 HC SEMI PRIVATE

## 2025-05-17 PROCEDURE — 85027 COMPLETE CBC AUTOMATED: CPT

## 2025-05-17 PROCEDURE — 99233 SBSQ HOSP IP/OBS HIGH 50: CPT | Performed by: INTERNAL MEDICINE

## 2025-05-17 PROCEDURE — 80048 BASIC METABOLIC PNL TOTAL CA: CPT

## 2025-05-17 PROCEDURE — 94761 N-INVAS EAR/PLS OXIMETRY MLT: CPT

## 2025-05-17 RX ORDER — DOCUSATE SODIUM 100 MG/1
100 CAPSULE, LIQUID FILLED ORAL DAILY
Status: DISCONTINUED | OUTPATIENT
Start: 2025-05-17 | End: 2025-05-20 | Stop reason: HOSPADM

## 2025-05-17 RX ORDER — ONDANSETRON 2 MG/ML
4 INJECTION INTRAMUSCULAR; INTRAVENOUS EVERY 6 HOURS PRN
Status: DISCONTINUED | OUTPATIENT
Start: 2025-05-17 | End: 2025-05-20 | Stop reason: HOSPADM

## 2025-05-17 RX ORDER — SENNOSIDES A AND B 8.6 MG/1
1 TABLET, FILM COATED ORAL NIGHTLY
Status: DISCONTINUED | OUTPATIENT
Start: 2025-05-17 | End: 2025-05-18

## 2025-05-17 RX ORDER — ONDANSETRON 2 MG/ML
4 INJECTION INTRAMUSCULAR; INTRAVENOUS ONCE
Status: COMPLETED | OUTPATIENT
Start: 2025-05-17 | End: 2025-05-17

## 2025-05-17 RX ORDER — ONDANSETRON 2 MG/ML
4 INJECTION INTRAMUSCULAR; INTRAVENOUS EVERY 6 HOURS PRN
Status: DISCONTINUED | OUTPATIENT
Start: 2025-05-17 | End: 2025-05-17 | Stop reason: SDUPTHER

## 2025-05-17 RX ADMIN — ONDANSETRON 4 MG: 2 INJECTION INTRAMUSCULAR; INTRAVENOUS at 22:17

## 2025-05-17 RX ADMIN — MYCOPHENOLATE MOFETIL 1500 MG: 250 CAPSULE ORAL at 22:10

## 2025-05-17 RX ADMIN — ONDANSETRON 4 MG: 2 INJECTION INTRAMUSCULAR; INTRAVENOUS at 05:02

## 2025-05-17 RX ADMIN — MYCOPHENOLATE MOFETIL 1500 MG: 250 CAPSULE ORAL at 11:02

## 2025-05-17 RX ADMIN — SODIUM CHLORIDE, PRESERVATIVE FREE 10 ML: 5 INJECTION INTRAVENOUS at 08:18

## 2025-05-17 RX ADMIN — DOCUSATE SODIUM 100 MG: 100 CAPSULE, LIQUID FILLED ORAL at 10:56

## 2025-05-17 RX ADMIN — Medication 2000 UNITS: at 10:55

## 2025-05-17 RX ADMIN — ATORVASTATIN CALCIUM 40 MG: 40 TABLET, FILM COATED ORAL at 10:56

## 2025-05-17 RX ADMIN — LEVOTHYROXINE SODIUM 150 MCG: 0.15 TABLET ORAL at 08:06

## 2025-05-17 RX ADMIN — SENNOSIDES 8.6 MG: 8.6 TABLET, FILM COATED ORAL at 22:10

## 2025-05-17 RX ADMIN — ATENOLOL 12.5 MG: 25 TABLET ORAL at 10:56

## 2025-05-17 RX ADMIN — ONDANSETRON 4 MG: 2 INJECTION INTRAMUSCULAR; INTRAVENOUS at 09:51

## 2025-05-17 RX ADMIN — ASPIRIN 325 MG: 325 TABLET ORAL at 10:55

## 2025-05-17 RX ADMIN — AMLODIPINE BESYLATE 10 MG: 10 TABLET ORAL at 10:56

## 2025-05-17 ASSESSMENT — PAIN DESCRIPTION - LOCATION
LOCATION: BACK
LOCATION: BACK

## 2025-05-17 ASSESSMENT — PAIN DESCRIPTION - DIRECTION: RADIATING_TOWARDS: SIDES OF ABDOMEN

## 2025-05-17 ASSESSMENT — PAIN DESCRIPTION - ONSET
ONSET: ON-GOING
ONSET: ON-GOING

## 2025-05-17 ASSESSMENT — PAIN DESCRIPTION - ORIENTATION
ORIENTATION: RIGHT;LEFT;LOWER;MID
ORIENTATION: RIGHT;LEFT;LOWER;MID

## 2025-05-17 ASSESSMENT — PAIN DESCRIPTION - PAIN TYPE
TYPE: ACUTE PAIN
TYPE: ACUTE PAIN

## 2025-05-17 ASSESSMENT — PAIN SCALES - GENERAL
PAINLEVEL_OUTOF10: 2
PAINLEVEL_OUTOF10: 5

## 2025-05-17 ASSESSMENT — PAIN DESCRIPTION - FREQUENCY
FREQUENCY: CONTINUOUS
FREQUENCY: CONTINUOUS

## 2025-05-17 ASSESSMENT — PAIN DESCRIPTION - DESCRIPTORS
DESCRIPTORS: DULL;ACHING
DESCRIPTORS: ACHING;DULL

## 2025-05-17 ASSESSMENT — PAIN - FUNCTIONAL ASSESSMENT
PAIN_FUNCTIONAL_ASSESSMENT: ACTIVITIES ARE NOT PREVENTED
PAIN_FUNCTIONAL_ASSESSMENT: PREVENTS OR INTERFERES SOME ACTIVE ACTIVITIES AND ADLS

## 2025-05-17 NOTE — PROGRESS NOTES
Had rough night  Back pain   Nausea and vomiting  Sat in chair all night   Get ct abdomen  Zofran for nausea

## 2025-05-17 NOTE — PLAN OF CARE
Problem: Safety - Adult  Goal: Free from fall injury  5/17/2025 0817 by Rober Salter, RN  Outcome: Progressing  5/17/2025 0237 by Saint Juste, Chedenine, RN  Outcome: Progressing     Problem: Pain  Goal: Verbalizes/displays adequate comfort level or baseline comfort level  Outcome: Progressing

## 2025-05-17 NOTE — PROGRESS NOTES
Cardiology Progress Note     Admit Date:  5/16/2025    Consult reason/ Seen today for :       Subjective and  Overnight Events : He was having significant back pain hence we proceeded with CT scan which is concerning for possible small retroperitoneal bleed hematoma?      Chief complain on admission : 80 y.o.year old who is admitted forNo chief complaint on file.     Assessment / Plan:  Failed Watchman procedure could not implant Watchman we will repeat CT scan to make sure hematoma is not getting any bigger hold off on any anticoagulation at this  Ongoing nausea use Zofran as needed  Paroxysmal afib: In sinus  HTN: stable, continue To titrate up medication as needed  DVT Prophylaxis if no contraindication  Discussed with primary team, hospitalist service, bedside nursing staff and family  Past medical history:    has a past medical history of Chronic cough, COPD, mild (HCC), Dyspnea on exertion, Essential hypertension, Former smoker, Gastroesophageal reflux disease without esophagitis, Graves' disease in remission, History of colon polyps, Hx of cardiovascular stress test, Hyperlipidemia, Mild persistent asthma without complication, Postnasal drip, and Secondary hypothyroidism.  Past surgical history:   has a past surgical history that includes Colonoscopy (Last: 2002); Tonsillectomy (1950's); Colonoscopy (N/A, 6/18/2021); and ep device procedure (N/A, 5/16/2025).  Social History:   reports that he quit smoking about 30 years ago. His smoking use included cigarettes. He started smoking about 50 years ago. He has a 20 pack-year smoking history. He has never used smokeless tobacco. He reports current alcohol use. He reports that he does not use drugs.  Family history:  family history includes Cancer in his father; Heart Disease in his mother; High Blood Pressure in his mother; High Cholesterol in his mother.    Allergies   Allergen

## 2025-05-17 NOTE — PROGRESS NOTES
4 Eyes Skin Assessment     NAME:  Tip Barton  YOB: 1945  MEDICAL RECORD NUMBER:  [id number]    The patient is being assessed for  Admission    I agree that at least one RN has performed a thorough Head to Toe Skin Assessment on the patient. ALL assessment sites listed below have been assessed.      Areas assessed by both nurses:    Head, Face, Ears, Shoulders, Back, Chest, Arms, Elbows, Hands, Sacrum. Buttock, Coccyx, Ischium, Legs. Feet and Heels, and Under Medical Devices         Does the Patient have a Wound? No noted wound(s)       Min Prevention initiated by RN: No  Wound Care Orders initiated by RN: No    Pressure Injury (Stage 3,4, Unstageable, DTI, NWPT, and Complex wounds) if present, place Wound referral order by RN under : No    New Ostomies, if present place, Ostomy referral order under : No     Nurse 1 eSignature: Electronically signed by Beti Sepulveda RN on 5/17/25 at 4:28 PM EDT    **SHARE this note so that the co-signing nurse can place an eSignature**    Nurse 2 eSignature: Electronically signed by Pili Orellana RN on 5/17/25 at 5:01 PM EDT

## 2025-05-18 ENCOUNTER — APPOINTMENT (OUTPATIENT)
Dept: CT IMAGING | Age: 80
DRG: 228 | End: 2025-05-18
Attending: INTERNAL MEDICINE
Payer: MEDICARE

## 2025-05-18 LAB
ANION GAP SERPL CALCULATED.3IONS-SCNC: 14 MMOL/L (ref 9–17)
BUN SERPL-MCNC: 25 MG/DL (ref 7–20)
CALCIUM SERPL-MCNC: 9.7 MG/DL (ref 8.3–10.6)
CHLORIDE SERPL-SCNC: 104 MMOL/L (ref 99–110)
CO2 SERPL-SCNC: 21 MMOL/L (ref 21–32)
CREAT SERPL-MCNC: 1.8 MG/DL (ref 0.8–1.3)
ERYTHROCYTE [DISTWIDTH] IN BLOOD BY AUTOMATED COUNT: 13.3 % (ref 11.7–14.9)
GFR, ESTIMATED: 37 ML/MIN/1.73M2
GLUCOSE SERPL-MCNC: 137 MG/DL (ref 74–99)
HCT VFR BLD AUTO: 50.5 % (ref 42–52)
HGB BLD-MCNC: 16.1 G/DL (ref 13.5–18)
MCH RBC QN AUTO: 28.6 PG (ref 27–31)
MCHC RBC AUTO-ENTMCNC: 31.9 G/DL (ref 32–36)
MCV RBC AUTO: 89.7 FL (ref 78–100)
PLATELET # BLD AUTO: 202 K/UL (ref 140–440)
PMV BLD AUTO: 9.2 FL (ref 7.5–11.1)
POTASSIUM SERPL-SCNC: 4.2 MMOL/L (ref 3.5–5.1)
RBC # BLD AUTO: 5.63 M/UL (ref 4.6–6.2)
SODIUM SERPL-SCNC: 138 MMOL/L (ref 136–145)
WBC OTHER # BLD: 13.9 K/UL (ref 4–10.5)

## 2025-05-18 PROCEDURE — 99233 SBSQ HOSP IP/OBS HIGH 50: CPT | Performed by: INTERNAL MEDICINE

## 2025-05-18 PROCEDURE — 80048 BASIC METABOLIC PNL TOTAL CA: CPT

## 2025-05-18 PROCEDURE — 6370000000 HC RX 637 (ALT 250 FOR IP): Performed by: INTERNAL MEDICINE

## 2025-05-18 PROCEDURE — 74176 CT ABD & PELVIS W/O CONTRAST: CPT

## 2025-05-18 PROCEDURE — 6370000000 HC RX 637 (ALT 250 FOR IP): Performed by: SPECIALIST

## 2025-05-18 PROCEDURE — 94761 N-INVAS EAR/PLS OXIMETRY MLT: CPT

## 2025-05-18 PROCEDURE — 36415 COLL VENOUS BLD VENIPUNCTURE: CPT

## 2025-05-18 PROCEDURE — 85027 COMPLETE CBC AUTOMATED: CPT

## 2025-05-18 PROCEDURE — 6360000002 HC RX W HCPCS: Performed by: INTERNAL MEDICINE

## 2025-05-18 PROCEDURE — 1200000000 HC SEMI PRIVATE

## 2025-05-18 RX ORDER — SENNOSIDES A AND B 8.6 MG/1
1 TABLET, FILM COATED ORAL 2 TIMES DAILY
Status: DISCONTINUED | OUTPATIENT
Start: 2025-05-18 | End: 2025-05-20 | Stop reason: HOSPADM

## 2025-05-18 RX ORDER — PANTOPRAZOLE SODIUM 40 MG/10ML
40 INJECTION, POWDER, LYOPHILIZED, FOR SOLUTION INTRAVENOUS DAILY
Status: DISCONTINUED | OUTPATIENT
Start: 2025-05-18 | End: 2025-05-20 | Stop reason: HOSPADM

## 2025-05-18 RX ORDER — POLYETHYLENE GLYCOL 3350 17 G/17G
17 POWDER, FOR SOLUTION ORAL 2 TIMES DAILY
Status: DISCONTINUED | OUTPATIENT
Start: 2025-05-18 | End: 2025-05-18 | Stop reason: SDUPTHER

## 2025-05-18 RX ORDER — SENNOSIDES A AND B 8.6 MG/1
2 TABLET, FILM COATED ORAL 2 TIMES DAILY
Status: DISCONTINUED | OUTPATIENT
Start: 2025-05-18 | End: 2025-05-18 | Stop reason: SDUPTHER

## 2025-05-18 RX ORDER — CALCIUM CARBONATE 500 MG/1
500 TABLET, CHEWABLE ORAL DAILY PRN
Status: DISCONTINUED | OUTPATIENT
Start: 2025-05-18 | End: 2025-05-20 | Stop reason: HOSPADM

## 2025-05-18 RX ORDER — ONDANSETRON 2 MG/ML
4 INJECTION INTRAMUSCULAR; INTRAVENOUS 3 TIMES DAILY
Status: DISCONTINUED | OUTPATIENT
Start: 2025-05-18 | End: 2025-05-20 | Stop reason: HOSPADM

## 2025-05-18 RX ORDER — POLYETHYLENE GLYCOL 3350 17 G/17G
17 POWDER, FOR SOLUTION ORAL 2 TIMES DAILY
Status: DISCONTINUED | OUTPATIENT
Start: 2025-05-18 | End: 2025-05-20 | Stop reason: HOSPADM

## 2025-05-18 RX ADMIN — SENNOSIDES 8.6 MG: 8.6 TABLET, FILM COATED ORAL at 16:27

## 2025-05-18 RX ADMIN — CALCIUM CARBONATE 500 MG: 500 TABLET, CHEWABLE ORAL at 18:26

## 2025-05-18 RX ADMIN — AMLODIPINE BESYLATE 10 MG: 10 TABLET ORAL at 09:27

## 2025-05-18 RX ADMIN — ONDANSETRON 4 MG: 2 INJECTION INTRAMUSCULAR; INTRAVENOUS at 21:31

## 2025-05-18 RX ADMIN — Medication 2000 UNITS: at 09:26

## 2025-05-18 RX ADMIN — SENNOSIDES 8.6 MG: 8.6 TABLET, FILM COATED ORAL at 21:31

## 2025-05-18 RX ADMIN — POLYETHYLENE GLYCOL (3350) 17 G: 17 POWDER, FOR SOLUTION ORAL at 21:32

## 2025-05-18 RX ADMIN — ATORVASTATIN CALCIUM 40 MG: 40 TABLET, FILM COATED ORAL at 09:27

## 2025-05-18 RX ADMIN — LEVOTHYROXINE SODIUM 150 MCG: 0.15 TABLET ORAL at 09:27

## 2025-05-18 RX ADMIN — MYCOPHENOLATE MOFETIL 1500 MG: 250 CAPSULE ORAL at 21:32

## 2025-05-18 RX ADMIN — ATENOLOL 12.5 MG: 25 TABLET ORAL at 09:27

## 2025-05-18 RX ADMIN — DOCUSATE SODIUM 100 MG: 100 CAPSULE, LIQUID FILLED ORAL at 09:27

## 2025-05-18 RX ADMIN — POLYETHYLENE GLYCOL (3350) 17 G: 17 POWDER, FOR SOLUTION ORAL at 13:24

## 2025-05-18 RX ADMIN — ONDANSETRON 4 MG: 2 INJECTION INTRAMUSCULAR; INTRAVENOUS at 12:33

## 2025-05-18 RX ADMIN — MYCOPHENOLATE MOFETIL 1500 MG: 250 CAPSULE ORAL at 09:26

## 2025-05-18 ASSESSMENT — PAIN SCALES - GENERAL: PAINLEVEL_OUTOF10: 2

## 2025-05-18 NOTE — PLAN OF CARE
Problem: Discharge Planning  Goal: Discharge to home or other facility with appropriate resources  Outcome: Progressing  Flowsheets (Taken 5/18/2025 3240)  Discharge to home or other facility with appropriate resources:   Identify barriers to discharge with patient and caregiver   Arrange for interpreters to assist at discharge as needed   Arrange for needed discharge resources and transportation as appropriate     Problem: Safety - Adult  Goal: Free from fall injury  Outcome: Progressing

## 2025-05-18 NOTE — PROGRESS NOTES
Cardiology Progress Note     Admit Date:  5/16/2025    Consult reason/ Seen today for :       Subjective and  Overnight Events :  CT scan which is concerning for possible small retroperitoneal bleed hematoma?  Having lo tof nausea       Chief complain on admission : 80 y.o.year old who is admitted forNo chief complaint on file.     Assessment / Plan:  Failed Watchman procedure could not implant Watchman we will repeat CT scan to make sure hematoma is not getting any bigger hold off on any anticoagulation at this  Will repat ct scan and cbc to make sure it is stable   Ongoing nausea use Zofran as needed  Paroxysmal afib: In sinus  HTN: stable, continue To titrate up medication as needed  DVT Prophylaxis if no contraindication  Discussed with primary team, hospitalist service, bedside nursing staff and family  Past medical history:    has a past medical history of Chronic cough, COPD, mild (HCC), Dyspnea on exertion, Essential hypertension, Former smoker, Gastroesophageal reflux disease without esophagitis, Graves' disease in remission, History of colon polyps, Hx of cardiovascular stress test, Hyperlipidemia, Mild persistent asthma without complication, Postnasal drip, and Secondary hypothyroidism.  Past surgical history:   has a past surgical history that includes Colonoscopy (Last: 2002); Tonsillectomy (1950's); Colonoscopy (N/A, 6/18/2021); and ep device procedure (N/A, 5/16/2025).  Social History:   reports that he quit smoking about 30 years ago. His smoking use included cigarettes. He started smoking about 50 years ago. He has a 20 pack-year smoking history. He has never used smokeless tobacco. He reports current alcohol use. He reports that he does not use drugs.  Family history:  family history includes Cancer in his father; Heart Disease in his mother; High Blood Pressure in his mother; High Cholesterol in his mother.    Allergies

## 2025-05-19 LAB
ALBUMIN SERPL-MCNC: 4.2 G/DL (ref 3.4–5)
ALBUMIN/GLOB SERPL: 1 {RATIO} (ref 1.1–2.2)
ALP SERPL-CCNC: 97 U/L (ref 40–129)
ALT SERPL-CCNC: 16 U/L (ref 10–40)
ANION GAP SERPL CALCULATED.3IONS-SCNC: 15 MMOL/L (ref 9–17)
AST SERPL-CCNC: 33 U/L (ref 15–37)
BASOPHILS # BLD: 0.04 K/UL
BASOPHILS NFR BLD: 0 % (ref 0–1)
BILIRUB SERPL-MCNC: 0.9 MG/DL (ref 0–1)
BUN SERPL-MCNC: 27 MG/DL (ref 7–20)
CALCIUM SERPL-MCNC: 10 MG/DL (ref 8.3–10.6)
CHLORIDE SERPL-SCNC: 101 MMOL/L (ref 99–110)
CO2 SERPL-SCNC: 22 MMOL/L (ref 21–32)
CREAT SERPL-MCNC: 1.8 MG/DL (ref 0.8–1.3)
EOSINOPHIL # BLD: 0 K/UL
EOSINOPHILS RELATIVE PERCENT: 0 % (ref 0–3)
ERYTHROCYTE [DISTWIDTH] IN BLOOD BY AUTOMATED COUNT: 13.2 % (ref 11.7–14.9)
GFR, ESTIMATED: 37 ML/MIN/1.73M2
GLUCOSE SERPL-MCNC: 123 MG/DL (ref 74–99)
HCT VFR BLD AUTO: 49.8 % (ref 42–52)
HGB BLD-MCNC: 15.9 G/DL (ref 13.5–18)
IMM GRANULOCYTES # BLD AUTO: 0.09 K/UL
IMM GRANULOCYTES NFR BLD: 1 %
INR PPP: 1.1
LIPASE SERPL-CCNC: 16 U/L (ref 13–60)
LYMPHOCYTES NFR BLD: 1.01 K/UL
LYMPHOCYTES RELATIVE PERCENT: 6 % (ref 24–44)
MCH RBC QN AUTO: 28.2 PG (ref 27–31)
MCHC RBC AUTO-ENTMCNC: 31.9 G/DL (ref 32–36)
MCV RBC AUTO: 88.5 FL (ref 78–100)
MONOCYTES NFR BLD: 1.11 K/UL
MONOCYTES NFR BLD: 7 % (ref 0–5)
NEUTROPHILS NFR BLD: 86 % (ref 36–66)
NEUTS SEG NFR BLD: 14.05 K/UL
PLATELET # BLD AUTO: 225 K/UL (ref 140–440)
PMV BLD AUTO: 9.2 FL (ref 7.5–11.1)
POTASSIUM SERPL-SCNC: 3.8 MMOL/L (ref 3.5–5.1)
PROT SERPL-MCNC: 8.5 G/DL (ref 6.4–8.2)
PROTHROMBIN TIME: 14.6 SEC (ref 11.7–14.5)
PSA SERPL-MCNC: 1.76 NG/ML (ref 0–4)
RBC # BLD AUTO: 5.63 M/UL (ref 4.6–6.2)
SODIUM SERPL-SCNC: 137 MMOL/L (ref 136–145)
WBC OTHER # BLD: 16.3 K/UL (ref 4–10.5)

## 2025-05-19 PROCEDURE — 6370000000 HC RX 637 (ALT 250 FOR IP): Performed by: INTERNAL MEDICINE

## 2025-05-19 PROCEDURE — 2500000003 HC RX 250 WO HCPCS: Performed by: INTERNAL MEDICINE

## 2025-05-19 PROCEDURE — 1200000000 HC SEMI PRIVATE

## 2025-05-19 PROCEDURE — 6360000002 HC RX W HCPCS: Performed by: INTERNAL MEDICINE

## 2025-05-19 PROCEDURE — 6370000000 HC RX 637 (ALT 250 FOR IP): Performed by: PHYSICIAN ASSISTANT

## 2025-05-19 PROCEDURE — 87086 URINE CULTURE/COLONY COUNT: CPT

## 2025-05-19 PROCEDURE — 80053 COMPREHEN METABOLIC PANEL: CPT

## 2025-05-19 PROCEDURE — 85025 COMPLETE CBC W/AUTO DIFF WBC: CPT

## 2025-05-19 PROCEDURE — G0103 PSA SCREENING: HCPCS

## 2025-05-19 PROCEDURE — 36415 COLL VENOUS BLD VENIPUNCTURE: CPT

## 2025-05-19 PROCEDURE — 2500000003 HC RX 250 WO HCPCS

## 2025-05-19 PROCEDURE — 85610 PROTHROMBIN TIME: CPT

## 2025-05-19 PROCEDURE — 99233 SBSQ HOSP IP/OBS HIGH 50: CPT | Performed by: INTERNAL MEDICINE

## 2025-05-19 PROCEDURE — 83690 ASSAY OF LIPASE: CPT

## 2025-05-19 PROCEDURE — 6360000002 HC RX W HCPCS: Performed by: SPECIALIST

## 2025-05-19 RX ORDER — TAMSULOSIN HYDROCHLORIDE 0.4 MG/1
0.4 CAPSULE ORAL DAILY
Status: DISCONTINUED | OUTPATIENT
Start: 2025-05-19 | End: 2025-05-20 | Stop reason: HOSPADM

## 2025-05-19 RX ORDER — SODIUM PHOSPHATE, DIBASIC AND SODIUM PHOSPHATE, MONOBASIC 7; 19 G/230ML; G/230ML
1 ENEMA RECTAL
Status: DISCONTINUED | OUTPATIENT
Start: 2025-05-19 | End: 2025-05-20 | Stop reason: HOSPADM

## 2025-05-19 RX ADMIN — WATER 10 ML: 1 INJECTION INTRAMUSCULAR; INTRAVENOUS; SUBCUTANEOUS at 10:23

## 2025-05-19 RX ADMIN — Medication 2000 UNITS: at 10:23

## 2025-05-19 RX ADMIN — PANTOPRAZOLE SODIUM 40 MG: 40 INJECTION, POWDER, FOR SOLUTION INTRAVENOUS at 10:22

## 2025-05-19 RX ADMIN — ATENOLOL 12.5 MG: 25 TABLET ORAL at 10:23

## 2025-05-19 RX ADMIN — MYCOPHENOLATE MOFETIL 1500 MG: 250 CAPSULE ORAL at 10:23

## 2025-05-19 RX ADMIN — ATORVASTATIN CALCIUM 40 MG: 40 TABLET, FILM COATED ORAL at 10:23

## 2025-05-19 RX ADMIN — POLYETHYLENE GLYCOL (3350) 17 G: 17 POWDER, FOR SOLUTION ORAL at 10:24

## 2025-05-19 RX ADMIN — SENNOSIDES 8.6 MG: 8.6 TABLET, FILM COATED ORAL at 10:23

## 2025-05-19 RX ADMIN — POLYETHYLENE GLYCOL (3350) 17 G: 17 POWDER, FOR SOLUTION ORAL at 21:55

## 2025-05-19 RX ADMIN — AMLODIPINE BESYLATE 10 MG: 10 TABLET ORAL at 10:24

## 2025-05-19 RX ADMIN — ONDANSETRON 4 MG: 2 INJECTION INTRAMUSCULAR; INTRAVENOUS at 21:55

## 2025-05-19 RX ADMIN — SENNOSIDES 8.6 MG: 8.6 TABLET, FILM COATED ORAL at 21:55

## 2025-05-19 RX ADMIN — TAMSULOSIN HYDROCHLORIDE 0.4 MG: 0.4 CAPSULE ORAL at 13:33

## 2025-05-19 RX ADMIN — DOCUSATE SODIUM 100 MG: 100 CAPSULE, LIQUID FILLED ORAL at 10:23

## 2025-05-19 RX ADMIN — SODIUM CHLORIDE, PRESERVATIVE FREE 10 ML: 5 INJECTION INTRAVENOUS at 10:24

## 2025-05-19 RX ADMIN — ONDANSETRON 4 MG: 2 INJECTION INTRAMUSCULAR; INTRAVENOUS at 10:22

## 2025-05-19 RX ADMIN — LEVOTHYROXINE SODIUM 150 MCG: 0.15 TABLET ORAL at 13:33

## 2025-05-19 RX ADMIN — MYCOPHENOLATE MOFETIL 1500 MG: 250 CAPSULE ORAL at 21:59

## 2025-05-19 RX ADMIN — SODIUM CHLORIDE, PRESERVATIVE FREE 10 ML: 5 INJECTION INTRAVENOUS at 22:08

## 2025-05-19 RX ADMIN — ONDANSETRON 4 MG: 2 INJECTION INTRAMUSCULAR; INTRAVENOUS at 15:21

## 2025-05-19 NOTE — PROGRESS NOTES
Cardiology Progress Note     Admit Date:  5/16/2025    Consult reason/ Seen today for :       Subjective and  Overnight Events : Still has lots of now nausea and vomited several times evaluated by gastroenterology CT scan showed significantly distended bladder      Chief complain on admission : 80 y.o.year old who is admitted forNo chief complaint on file.     Assessment / Plan:  Failed Watchman procedure could not implant Watchman we will repeat CT scan to make sure hematoma is not getting any bigger hold off on any anticoagulation at this  Nausea vomiting started on around-the-clock treatment strategy as per gastroenterology supportive care  Urinary retention evaluated by urology advised to wear Lujan  Ongoing nausea use Zofran as needed  Paroxysmal afib: In sinus  HTN: stable, continue To titrate up medication as needed  DVT Prophylaxis if no contraindication  Discussed with primary team, hospitalist service, bedside nursing staff and family  Past medical history:    has a past medical history of Chronic cough, COPD, mild (HCC), Dyspnea on exertion, Essential hypertension, Former smoker, Gastroesophageal reflux disease without esophagitis, Graves' disease in remission, History of colon polyps, Hx of cardiovascular stress test, Hyperlipidemia, Mild persistent asthma without complication, Postnasal drip, and Secondary hypothyroidism.  Past surgical history:   has a past surgical history that includes Colonoscopy (Last: 2002); Tonsillectomy (1950's); Colonoscopy (N/A, 06/18/2021); and ep device procedure (N/A, 05/16/2025).  Social History:   reports that he quit smoking about 30 years ago. His smoking use included cigarettes. He started smoking about 50 years ago. He has a 20 pack-year smoking history. He has never used smokeless tobacco. He reports current alcohol use. He reports that he does not use drugs.  Family history:  family

## 2025-05-19 NOTE — CARE COORDINATION
05/16/25 9202   Service Assessment   Patient Orientation Alert and Oriented   Cognition Alert   History Provided By Patient;Spouse   Primary Caregiver Self   Accompanied By/Relationship spouse   Support Systems Spouse/Significant Other;Family Members   Patient's Healthcare Decision Maker is: Legal Next of Kin   PCP Verified by CM Yes   Last Visit to PCP Within last 6 months   Prior Functional Level Independent in ADLs/IADLs   Current Functional Level Assistance with the following:;Bathing;Toileting;Mobility  (hospital policy)   Can patient return to prior living arrangement Yes   Ability to make needs known: Good   Family able to assist with home care needs: Yes   Would you like for me to discuss the discharge plan with any other family members/significant others, and if so, who? Yes  (spouse)   Financial Resources Medicare;Other (Comment)  (medicare supplement)   Community Resources None   Social/Functional History   Lives With Spouse   Type of Home House   Home Layout One level;Laundry in basement   Home Access Stairs to enter with rails   Entrance Stairs - Number of Steps 4   Bathroom Equipment Shower chair;Grab bars in shower   Home Equipment Grab bars   Receives Help From Other (Comment)  (n/a)   Prior Level of Assist for ADLs Independent   Prior Level of Assist for Homemaking Independent   Homemaking Responsibilities Yes   Ambulation Assistance Independent   Prior Level of Assist for Transfers Independent   Active  Yes   Occupation Retired   Discharge Planning   Type of Residence House   Living Arrangements Spouse/Significant Other   Current Services Prior To Admission None   Potential Assistance Needed N/A   Potential Assistance Purchasing Medications No   Type of Home Care Services None   Patient expects to be discharged to: House   One/Two Story Residence One story   Services At/After Discharge   Services At/After Discharge None   Confirm Follow Up Transport Family     CM reviewed chart and went to 
Discharge plans remains for home with wife.  No needs at this time. CM will continue to follow.    
English

## 2025-05-19 NOTE — OP NOTE
Operative Note      Patient: Tip Barton  YOB: 1945  MRN: [id number]    Date of Procedure: 5/16/2025    Pre-Op Diagnosis Codes:      * Paroxysmal atrial fibrillation (HCC) [I48.0]    Post-Op Diagnosis:  urinary retention; right hydro       Procedure(s):  Watchman jd closure device    Surgeon(s):  Robby Michael MD    Assistant:   * No surgical staff found *    Anesthesia: General    Estimated Blood Loss (mL): Minimal    Complications: None    Specimens:   * No specimens in log *    Implants:  Implant Name Type Inv. Item Serial No.  Lot No. LRB No. Used Action   OCCLUDER CV WATCHMAN FLX PRO SZ 20 MM SYS DIA12 FR FRME NIT POLYESTER JD - [id number] Cardiovascular occluders OCCLUDER CV WATCHMAN FLX PRO SZ 20 MM SYS DIA12 FR FRME NIT POLYESTER JD  [employer] Daily [employer]als- [id number] N/A 1 Implanted   OCCLUDER CV WATCHMAN FLX PRO SZ 24 MM SYS DIA12 FR FRME NIT POLYESTER JD - [id number] Cardiovascular occluders OCCLUDER CV WATCHMAN FLX PRO SZ 24 MM SYS DIA12 FR FRME NIT POLYESTER JD  [employer] Daily [employer]-WD [id number] N/A 1 Implanted   OCCLUDER CV WATCHMAN FLX PRO SZ 20 MM SYS DIA12 FR FRME NIT POLYESTER JD - [id number] Cardiovascular occluders OCCLUDER CV WATCHMAN FLX PRO SZ 20 MM SYS DIA12 FR FRME NIT POLYESTER JD  [employer]- [id number] N/A 1 Implanted         Drains: * No LDAs found *    Findings:  Infection Present At Time Of Surgery (PATOS) (choose all levels that have infection present):  No infection present  Other Findings: high bladder neck and elevated median lobe    Detailed Description of Procedure:   This is a lopez was made to place Lujan catheter nurses were unsuccessful.  I attempted a 14 coude cath wihtout success.  I recommend cysto guidance for wire placement.    Description of procedure:  PAtient  area P&D in sterile fashion. A flex cystoscope was introduced into bladder. High BN and elevated median lobe.  No tunmors or lesions in bladder.  Sensor wire placed

## 2025-05-19 NOTE — PROGRESS NOTES
DOING FAIR STILL NAUSEATED AND VOMITED TODAY  UNABLE TO EAT  NO BM X 5 DAYS PT WOULD LIKE FLEETS ENEMA   F/U CT SHOWED BLADDER DISTENTION NEEDS MORGAN CATHETER -NURSES ATTEMPTED  [id number] TO TRY NOW  VITALS STABLE   LABS NOTED WBC 16.3  AND BUN/ CR 27/1.8  WILL CPM GIVE FLEETS ENEMA D/W PT AND WIFE

## 2025-05-19 NOTE — CONSULTS
CONSULT DICTATED #[id number]  
discussed with the patient's bedside RNBeti.          TIMOTHY URIBE MD      D:  05/18/2025 18:35:44     T:  05/18/2025 19:16:21     AR/OPAL  Job #:  [id number]     Doc#:  [id number]     
limitation of noncontrast imaging. Gallbladder: Unremarkable. Spleen: Unremarkable. Pancreas: Unremarkable. Adrenals: Unremarkable. Kidneys: There is a mild right hydronephrosis and dilation of the proximal-mid right ureter, similar when compared to 5/17/2025. No definite obstructing calculus is evident. There has been interval development of a small amount of right perinephric fluid. There is right perinephric fat standing as well as fat stranding/trace fluid within the right retroperitoneum. The fluid appears low in attenuation and  does not appear hyperdense. However, the more focal region of fluid within the posterior aspect of the right retroperitoneum on series 2 image 48 of the prior study appears improved when compared to the 5/17/2025. There is a 4.9 cm low-density lesion within the right kidney which measures 14 Hounsfield units (series 2 image 46), incompletely characterized in the lack of intravenous contrast although this could represent a cyst (which requires no further imaging follow-up). There is no dilation of the left renal collecting system as well as normal dilation of the left ureter which may relate to the distended urinary bladder. Urinary Bladder: The urinary bladder is distended. Reproductive: Prostate is mildly enlarged. Dystrophic prosthetic calcifications are evident. Seminal vesicles are unremarkable. Bowel: The small and large bowel are normal in caliber without evidence of obstruction. Scattered colonic diverticula are present without adjacent fat stranding. Vasculature: There is atherosclerosis. Retroaortic left renal vein. Lymphatic system: No suspicious lymphadenopathy is demonstrated. Peritoneum: There is trace perihepatic free fluid, new from prior. There is trace fluid within the right lateral abdomen, increased when compared to 5/17/2025. Trace free pelvic fluid. Abdominal wall: Small fat-containing umbilical hernia. There is subcutaneous fat  stranding within the right inguinal

## 2025-05-19 NOTE — PLAN OF CARE
Problem: Safety - Adult  Goal: Free from fall injury  Outcome: Progressing     Problem: Pain  Goal: Verbalizes/displays adequate comfort level or baseline comfort level  Outcome: Progressing     Problem: Discharge Planning  Goal: Discharge to home or other facility with appropriate resources  Flowsheets (Taken 5/18/2025 0523)  Discharge to home or other facility with appropriate resources:   Identify barriers to discharge with patient and caregiver   Arrange for interpreters to assist at discharge as needed   Arrange for needed discharge resources and transportation as appropriate     Problem: Discharge Planning  Goal: Discharge to home or other facility with appropriate resources  Flowsheets (Taken 5/18/2025 0523)  Discharge to home or other facility with appropriate resources:   Identify barriers to discharge with patient and caregiver   Arrange for interpreters to assist at discharge as needed   Arrange for needed discharge resources and transportation as appropriate

## 2025-05-19 NOTE — PROGRESS NOTES
MRN: [id number]  Name: Tip Barton  : 1945    Insurance: Payor: MEDICARE /  /  /      Phone #: [phone]  Provider: MACK Chow CNP     Date of Visit: 2025    Reason for visit: Watchman 1 wk f/u Recent Hospitalization Date:    Reason for Hospitalization:     Last EK25   Type of Device:       Vitals BP HR O2% WT HT ORTHO BP LYING ORTHO BP SITTING ORTHO BP SITTING   Today's Findings           Patients work up- Check List     Testing Last Date Completed Date Expected  ([facility]) Additional Notes    MA to document For provider to complete Either MA or Provider    Carotid Duplex  STAT 1 WK 6 MTH       THIS WK 2 WK 1 YEAR     Cardiac CTA  STAT 1 WK 6 MTH       THIS WK 2 WK 1 YEAR     Cardiac CT Calcium scoring  STAT 1 WK 6 MTH       THIS WK 2 WK 1 YEAR     CTA Chest, Abdomen & Pelvis  STAT 1 WK 6 MTH       THIS WK 2 WK 1 YEAR     CT Chest IV w/ Contrast  STAT 1 WK 6 MTH       THIS WK 2 WK 1 YEAR     CT Chest w/o Contrast  STAT 1 WK 6 MTH       THIS WK 2 WK 1 YEAR     CXR 5/15/25 STAT 1 WK 6 MTH       THIS WK 2 WK 1 YEAR     ECHO 25 Stress Complete Limited     MRI- Cardiac  STAT 1 WK 6 MTH       THIS WK 2 WK 1 YEAR     MUGA Scan  STAT 1 WK 6 MTH       THIS WK 2 WK 1 YEAR     Nuclear Stress  Lexiscan Cardiolite     PFT  STAT 1 WK 6 MTH       THIS WK 2 WK 1 YEAR     Treadmill Stress Test  STAT 1 WK 6 MTH       THIS WK 2 WK 1 YEAR     Vascular Duplex  Lower: Right Left Bilat       Upper: Right Left Bilat     Other Test Not Listed:    Monitors Last Date Completed Day's Request/Ordered     Holter  Short term 24 hours 48 hours      Long term 3 days 7 days 14 days   Event   (1-30 days)      Procedures Last Date Performed Procedure Details Date Expected   Additional Notes    ASD Closure        Carotid Angio        Cardioversion        Heart Cath  R L R&L      Peripheral Angio  R L      PFO Closure        PTCA/PCI        DILMA        DILMA/Cardioversion        Venogram        Tilt Table

## 2025-05-20 VITALS
BODY MASS INDEX: 25.06 KG/M2 | HEART RATE: 76 BPM | SYSTOLIC BLOOD PRESSURE: 119 MMHG | WEIGHT: 185 LBS | DIASTOLIC BLOOD PRESSURE: 62 MMHG | OXYGEN SATURATION: 94 % | HEIGHT: 72 IN | RESPIRATION RATE: 18 BRPM | TEMPERATURE: 97.6 F

## 2025-05-20 LAB
ABSOLUTE BANDS: 0.51 K/UL
ALBUMIN SERPL-MCNC: 3.9 G/DL (ref 3.4–5)
ALBUMIN/GLOB SERPL: 1 {RATIO} (ref 1.1–2.2)
ALP SERPL-CCNC: 95 U/L (ref 40–129)
ALT SERPL-CCNC: 17 U/L (ref 10–40)
ANION GAP SERPL CALCULATED.3IONS-SCNC: 13 MMOL/L (ref 9–17)
AST SERPL-CCNC: 29 U/L (ref 15–37)
BANDS: 4 %
BASOPHILS # BLD: 0 K/UL
BASOPHILS NFR BLD: 0 % (ref 0–1)
BILIRUB SERPL-MCNC: 1.2 MG/DL (ref 0–1)
BUN SERPL-MCNC: 30 MG/DL (ref 7–20)
CALCIUM SERPL-MCNC: 9.3 MG/DL (ref 8.3–10.6)
CHLORIDE SERPL-SCNC: 102 MMOL/L (ref 99–110)
CO2 SERPL-SCNC: 23 MMOL/L (ref 21–32)
CREAT SERPL-MCNC: 1.3 MG/DL (ref 0.8–1.3)
EOSINOPHIL # BLD: 0 K/UL
EOSINOPHILS RELATIVE PERCENT: 0 % (ref 0–3)
ERYTHROCYTE [DISTWIDTH] IN BLOOD BY AUTOMATED COUNT: 13.2 % (ref 11.7–14.9)
GFR, ESTIMATED: 52 ML/MIN/1.73M2
GLUCOSE SERPL-MCNC: 101 MG/DL (ref 74–99)
HCT VFR BLD AUTO: 45 % (ref 42–52)
HGB BLD-MCNC: 13.9 G/DL (ref 13.5–18)
LIPASE SERPL-CCNC: 23 U/L (ref 13–60)
LYMPHOCYTES NFR BLD: 2.18 K/UL
LYMPHOCYTES RELATIVE PERCENT: 17 % (ref 24–44)
MCH RBC QN AUTO: 29 PG (ref 27–31)
MCHC RBC AUTO-ENTMCNC: 30.9 G/DL (ref 32–36)
MCV RBC AUTO: 93.9 FL (ref 78–100)
MICROORGANISM SPEC CULT: NORMAL
MONOCYTES NFR BLD: 1.66 K/UL
MONOCYTES NFR BLD: 13 % (ref 0–5)
NEUTROPHILS NFR BLD: 66 % (ref 36–66)
NEUTS SEG NFR BLD: 8.45 K/UL
PLATELET # BLD AUTO: 176 K/UL (ref 140–440)
PLATELET, FLUORESCENCE: ABNORMAL K/UL (ref 140–440)
PMV BLD AUTO: 11.1 FL (ref 7.5–11.1)
POTASSIUM SERPL-SCNC: 3.7 MMOL/L (ref 3.5–5.1)
PROT SERPL-MCNC: 8 G/DL (ref 6.4–8.2)
RBC # BLD AUTO: 4.79 M/UL (ref 4.6–6.2)
RBC # BLD: ABNORMAL 10*6/UL
RBC # BLD: NORMAL 10*6/UL
SERVICE CMNT-IMP: NORMAL
SODIUM SERPL-SCNC: 138 MMOL/L (ref 136–145)
SPECIMEN DESCRIPTION: NORMAL
WBC # BLD: ABNORMAL 10*3/UL
WBC # BLD: ABNORMAL 10*3/UL
WBC # BLD: NORMAL 10*3/UL
WBC OTHER # BLD: 12.8 K/UL (ref 4–10.5)

## 2025-05-20 PROCEDURE — 6360000002 HC RX W HCPCS: Performed by: SPECIALIST

## 2025-05-20 PROCEDURE — 80053 COMPREHEN METABOLIC PANEL: CPT

## 2025-05-20 PROCEDURE — 83690 ASSAY OF LIPASE: CPT

## 2025-05-20 PROCEDURE — 99239 HOSP IP/OBS DSCHRG MGMT >30: CPT

## 2025-05-20 PROCEDURE — 36415 COLL VENOUS BLD VENIPUNCTURE: CPT

## 2025-05-20 PROCEDURE — 85025 COMPLETE CBC W/AUTO DIFF WBC: CPT

## 2025-05-20 PROCEDURE — 6370000000 HC RX 637 (ALT 250 FOR IP): Performed by: PHYSICIAN ASSISTANT

## 2025-05-20 PROCEDURE — 6370000000 HC RX 637 (ALT 250 FOR IP): Performed by: INTERNAL MEDICINE

## 2025-05-20 PROCEDURE — 94761 N-INVAS EAR/PLS OXIMETRY MLT: CPT

## 2025-05-20 PROCEDURE — 6360000002 HC RX W HCPCS: Performed by: INTERNAL MEDICINE

## 2025-05-20 RX ORDER — TAMSULOSIN HYDROCHLORIDE 0.4 MG/1
0.4 CAPSULE ORAL DAILY
Qty: 30 CAPSULE | Refills: 3 | Status: SHIPPED | OUTPATIENT
Start: 2025-05-21

## 2025-05-20 RX ADMIN — TAMSULOSIN HYDROCHLORIDE 0.4 MG: 0.4 CAPSULE ORAL at 08:49

## 2025-05-20 RX ADMIN — ONDANSETRON 4 MG: 2 INJECTION INTRAMUSCULAR; INTRAVENOUS at 08:49

## 2025-05-20 RX ADMIN — Medication 2000 UNITS: at 08:48

## 2025-05-20 RX ADMIN — AMLODIPINE BESYLATE 10 MG: 10 TABLET ORAL at 08:48

## 2025-05-20 RX ADMIN — ATENOLOL 12.5 MG: 25 TABLET ORAL at 08:54

## 2025-05-20 RX ADMIN — LEVOTHYROXINE SODIUM 150 MCG: 0.15 TABLET ORAL at 08:48

## 2025-05-20 RX ADMIN — ATORVASTATIN CALCIUM 40 MG: 40 TABLET, FILM COATED ORAL at 08:48

## 2025-05-20 RX ADMIN — MYCOPHENOLATE MOFETIL 1500 MG: 250 CAPSULE ORAL at 08:54

## 2025-05-20 RX ADMIN — POLYETHYLENE GLYCOL (3350) 17 G: 17 POWDER, FOR SOLUTION ORAL at 08:48

## 2025-05-20 RX ADMIN — DOCUSATE SODIUM 100 MG: 100 CAPSULE, LIQUID FILLED ORAL at 08:49

## 2025-05-20 RX ADMIN — PANTOPRAZOLE SODIUM 40 MG: 40 INJECTION, POWDER, FOR SOLUTION INTRAVENOUS at 08:49

## 2025-05-20 RX ADMIN — SENNOSIDES 8.6 MG: 8.6 TABLET, FILM COATED ORAL at 08:49

## 2025-05-20 NOTE — PROGRESS NOTES
Outpatient Pharmacy Progress Note for Meds-to-Beds    Total number of Prescriptions Filled: 1    Additional Documentation:  Patient's family member picked-up the medication(s) in the OP Pharmacy      Thank you for letting us serve your patients.  [address] [address]    Phone: [phone]    Fax: [phone]

## 2025-05-20 NOTE — DISCHARGE INSTRUCTIONS
Heart Cath Limitations  -No lifting over 15 pounds for 5 days.  -Avoid driving for 48 hours  -No water submersion (bath, hot tub, swimming), but okay to shower after discharge.    Some bruising is common and should not be alarming.   Advised to return to emergency department ASAP if you notice bright red blood coming from catheter access site or if there is a firm mass/lump that is growing. Potential life threatening condition. Hold pressure on site immediately  Informed to contact the office if they notice new fever, excessive warmth, redness, swelling, or pus draining from site.

## 2025-05-20 NOTE — PROGRESS NOTES
DOING WELL ABD SYMPTOMS RESOLVED AFTER MORGAN INSERTION WITH LARGE AMOUNT OF RETAINED URINE DRAINED HAD BM YESTERDAY ALSO NO N/ VOMITING  VITALS STABLE   LABS NOTED   PT BEING D/C TODAY WILL F/U AS NEEDED D/W PT--RN AND WIFE

## 2025-05-20 NOTE — PROGRESS NOTES
Cardiology Progress Note     Admit Date:  5/16/2025    Consult reason/ Seen today for :       Subjective and  Overnight Events : Had significant urinary retention evaluated by urology underwent Lujan's placement      Chief complain on admission : 80 y.o.year old who is admitted forNo chief complaint on file.     Assessment / Plan:  Failed Watchman procedure could not implant Watchman we will repeat CT scan to make sure hematoma is not getting any bigger hold off on any anticoagulation at this time for at least 1 week until urology issues are resolved  Urinary retention as per urology follow-up as outpatient  Nausea vomiting started on around-the-clock treatment strategy as per gastroenterology supportive care  Urinary retention evaluated by urology advised to wear Lujan  Ongoing nausea use Zofran as needed  Paroxysmal afib: In sinus hold off on anticoagulation until Lujan is removed in 1 week's time  HTN: stable, continue To titrate up medication as needed  DVT Prophylaxis if no contraindication  Discussed with primary team, hospitalist service, bedside nursing staff and family  Past medical history:    has a past medical history of Chronic cough, COPD, mild (HCC), Dyspnea on exertion, Essential hypertension, Former smoker, Gastroesophageal reflux disease without esophagitis, Graves' disease in remission, History of colon polyps, Hx of cardiovascular stress test, Hyperlipidemia, Mild persistent asthma without complication, Postnasal drip, and Secondary hypothyroidism.  Past surgical history:   has a past surgical history that includes Colonoscopy (Last: 2002); Tonsillectomy (1950's); Colonoscopy (N/A, 06/18/2021); and ep device procedure (N/A, 05/16/2025).  Social History:   reports that he quit smoking about 30 years ago. His smoking use included cigarettes. He started smoking about 50 years ago. He has a 20 pack-year smoking history. He

## 2025-05-20 NOTE — PROGRESS NOTES
[facility] [address]   Progress Note  [facility] [address]      Date: 2025   Patient: Tip Barton   : 1945   DOA: 2025   MRN: [id number]   ROOM#: [id number]     Admit Date: 2025     Collaborating Urologist on Call at time of admission: Dr. Cook   CC: A-fib   Reason for Consult: Hydronephrosis and distended bladder     Subjective:     Pain: none, no nausea and no vomiting,   Bowel Movement/Flatus: Yes  Voidinfr [place] tip catheter in place, urine clear yellow     Pt resting in bed, reports feeling much better since catheter placement yesterday. No longer with back/abdominal pain.    Objective:    Vitals:   /69   Pulse 59   Temp 98 °F (36.7 °C) (Oral)   Resp 16   Ht 1.829 m (6')   Wt 83.9 kg (185 lb)   SpO2 91%   BMI 25.09 kg/m²   Temp  Av.9 °F (36.6 °C)  Min: 97.7 °F (36.5 °C)  Max: 98 °F (36.7 °C)    Intake/Output Summary (Last 24 hours) at 2025 0747  Last data filed at 2025 0600  Gross per 24 hour   Intake 10 ml   Output 1950 ml   Net -1940 ml       Physical Exam:   Gen: Pleasant male, in NAD  Neuro: Non-focal  Resp: Unlabored breathing  Abd: Soft, non-distended, non-tender to palpation, no rebound  Back:   No CVAT  : 16fr [place] tip catheter in place, urine clear yellow   Ext: No edema of bilateral LEs    Labs:  WBC:    Lab Results   Component Value Date/Time    WBC 12.8 2025 03:10 AM     Hemoglobin/Hematocrit:    Lab Results   Component Value Date/Time    HGB 13.9 2025 03:10 AM    HCT 45.0 2025 03:10 AM     BMP:   Lab Results   Component Value Date/Time     2025 04:06 AM    K 3.8 2025 04:06 AM     2025 04:06 AM    CO2 22 2025 04:06 AM    BUN 27 2025 04:06 AM    CREATININE 1.8 2025 04:06 AM    CALCIUM 10.0 2025 04:06 AM    GFRAA >60 2022 12:52 PM    LABGLOM 37 2025 04:06 AM    LABGLOM >60 2022 09:01 AM     Urine Culture:

## 2025-05-20 NOTE — PLAN OF CARE
Problem: Discharge Planning  Goal: Discharge to home or other facility with appropriate resources  Outcome: Progressing     Problem: Safety - Adult  Goal: Free from fall injury  Outcome: Progressing     Problem: Pain  Goal: Verbalizes/displays adequate comfort level or baseline comfort level  Outcome: Progressing     Problem: Gastrointestinal - Adult  Goal: Minimal or absence of nausea and vomiting  Outcome: Progressing     Problem: Genitourinary - Adult  Goal: Urinary catheter remains patent  Outcome: Progressing

## 2025-05-20 NOTE — DISCHARGE SUMMARY
Patient ID:  Tip Barton  [id number] 80 y.o. 1945    Admit date: 5/16/2025    Discharge date: 5/20/2025    Admitting Physician: Robby Chow MD     Discharge Provider: Denzel Murray PA-C     Admission Diagnoses: Paroxysmal atrial fibrillation (HCC) [I48.0]  A-fib (HCC) [I48.91]    Discharge Diagnoses:   Patient Active Problem List   Diagnosis    Essential hypertension    Hyperlipidemia    Secondary hypothyroidism    Graves' disease in remission    History of colon polyps    Chronic cough    Postnasal drip    Gastroesophageal reflux disease without esophagitis    Former smoker    Dyspnea on exertion    Mild intermittent asthma without complication    Mild persistent asthma without complication    Hoarseness    PAF (paroxysmal atrial fibrillation) (HCC)    Secondary hypercoagulable state    Abnormal EKG    Frequent PVCs    Paroxysmal atrial fibrillation (HCC)    A-fib (HCC)        Discharged Condition: fair    Hospital Course: Tip Barton was admitted in order to undergo Watchman device placement    Paroxysmal atrial fibrillation  Failed Watchman device placement  -Remains in sinus rhythm  -Patient has been having significant bruising but no significant falls, anemia or GI bleeding  -Will resume patient's Eliquis following Lujan catheter removal next week  -Atenolol 12.5 mg daily  Hypertension  - Blood pressure stable at this time.  - Amlodipine 10 mg daily  Hyperlipidemia  -On simvastatin at home  Intractable nausea vomiting  -Gastroenterology consulted.  - No need for endoscopy.  -Was placed on IV Protonix  Hydronephrosis with urinary retention s/p Lujan catheter placement  - Urology consulted.  - Urine cultures pending  - Plan for Lujan removal in outpatient setting.  - Initiated on Flomax  Postoperative retroperitoneal hematoma  - Stable  -    Consults: GI and urology    Procedures:  Attempted Watchman device placement with Dr. Chow 5/16/2025    Failed attempted deployment of left atrial

## 2025-05-21 ENCOUNTER — TELEPHONE (OUTPATIENT)
Dept: CARDIOLOGY CLINIC | Age: 80
End: 2025-05-21

## 2025-05-21 LAB
ABO/RH: NORMAL
ACTIVATED CLOTTING TIME, LOW RANGE: 288 SEC (ref 89–169)
ANTIBODY SCREEN: NEGATIVE
BLOOD BANK COMMENT: NORMAL
BLOOD BANK DISPENSE STATUS: NORMAL
BLOOD BANK SAMPLE EXPIRATION: NORMAL
BPU ID: NORMAL
COMPONENT: NORMAL
CROSSMATCH RESULT: NORMAL
TRANSFUSION STATUS: NORMAL
UNIT DIVISION: 0
UNIT TAG COMMENT: NORMAL

## 2025-05-21 NOTE — TELEPHONE ENCOUNTER
Called pt to schedule appt for next week with Dr. Claudine Kirby and pt did not want to come in due to having a catheter and did not want to come until that is removed. He is unsure of when it will be removed at this. Made appt for 6/3 @2:45.

## 2025-05-22 LAB — ACTIVATED CLOTTING TIME, LOW RANGE: >400 SEC (ref 89–169)

## 2025-05-23 ENCOUNTER — TELEPHONE (OUTPATIENT)
Dept: CARDIOLOGY CLINIC | Age: 80
End: 2025-05-23

## 2025-05-23 NOTE — TELEPHONE ENCOUNTER
Denzel Murray, MIHIR  P [facility] was seen patient was seen in the hospital for watchman.  Patient will need to follow-up with Dr. Chow in 1 weeks

## 2025-05-27 NOTE — PROGRESS NOTES
MRN: [id number]  Name: Tip Barton  : 1945    Insurance: Payor: MEDICARE /  /  /      Phone #: [phone]  Provider: Robby Chow MD     Date of Visit: 2025    Reason for visit:  Recent Hospitalization Date:    Reason for Hospitalization:    Last EK2025  Type of Device:       Vitals BP HR O2% WT HT ORTHO BP LYING ORTHO BP SITTING ORTHO BP SITTING   Today's Findings           Patients work up- Check List     Testing Last Date Completed Date Expected  ([facility]) Additional Notes    MA to document For provider to complete Either MA or Provider    Carotid Duplex  STAT 1 WK 6 MTH       THIS WK 2 WK 1 YEAR     Cardiac CTA  STAT 1 WK 6 MTH       THIS WK 2 WK 1 YEAR     Cardiac CT Calcium scoring  STAT 1 WK 6 MTH       THIS WK 2 WK 1 YEAR     CTA Chest, Abdomen & Pelvis  STAT 1 WK 6 MTH       THIS WK 2 WK 1 YEAR     CT Chest IV w/ Contrast  STAT 1 WK 6 MTH       THIS WK 2 WK 1 YEAR     CT Chest w/o Contrast  STAT 1 WK 6 MTH       THIS WK 2 WK 1 YEAR     CXR  STAT 1 WK 6 MTH       THIS WK 2 WK 1 YEAR     ECHO 2025 Stress Complete Limited     MRI- Cardiac  STAT 1 WK 6 MTH       THIS WK 2 WK 1 YEAR     MUGA Scan  STAT 1 WK 6 MTH       THIS WK 2 WK 1 YEAR     Nuclear Stress  Lexiscan Cardiolite     PFT  STAT 1 WK 6 MTH       THIS WK 2 WK 1 YEAR     Treadmill Stress Test  STAT 1 WK 6 MTH       THIS WK 2 WK 1 YEAR     Vascular Duplex  Lower: Right Left Bilat       Upper: Right Left Bilat     Other Test Not Listed:    Monitors Last Date Completed Day's Request/Ordered     Holter  Short term 24 hours 48 hours      Long term 3 days 7 days 14 days   Event   (1-30 days)      Procedures Last Date Performed Procedure Details Date Expected   Additional Notes    ASD Closure        Carotid Angio        Cardioversion        Heart Cath  R L R&L      Peripheral Angio  R L      PFO Closure        PTCA/PCI        DILMA 2025       DILMA/Cardioversion        Venogram        Tilt Table        Other Type of

## 2025-06-02 ENCOUNTER — OFFICE VISIT (OUTPATIENT)
Dept: CARDIOLOGY CLINIC | Age: 80
End: 2025-06-02
Payer: MEDICARE

## 2025-06-02 VITALS
DIASTOLIC BLOOD PRESSURE: 64 MMHG | HEIGHT: 72 IN | BODY MASS INDEX: 25.73 KG/M2 | HEART RATE: 72 BPM | WEIGHT: 190 LBS | SYSTOLIC BLOOD PRESSURE: 112 MMHG

## 2025-06-02 DIAGNOSIS — I48.0 PAROXYSMAL ATRIAL FIBRILLATION (HCC): Primary | ICD-10-CM

## 2025-06-02 DIAGNOSIS — E05.00 GRAVES' DISEASE IN REMISSION: ICD-10-CM

## 2025-06-02 DIAGNOSIS — E03.8 SECONDARY HYPOTHYROIDISM: ICD-10-CM

## 2025-06-02 DIAGNOSIS — I49.3 FREQUENT PVCS: ICD-10-CM

## 2025-06-02 DIAGNOSIS — D68.69 SECONDARY HYPERCOAGULABLE STATE: ICD-10-CM

## 2025-06-02 DIAGNOSIS — I48.0 PAF (PAROXYSMAL ATRIAL FIBRILLATION) (HCC): ICD-10-CM

## 2025-06-02 PROCEDURE — G8417 CALC BMI ABV UP PARAM F/U: HCPCS | Performed by: INTERNAL MEDICINE

## 2025-06-02 PROCEDURE — 1123F ACP DISCUSS/DSCN MKR DOCD: CPT | Performed by: INTERNAL MEDICINE

## 2025-06-02 PROCEDURE — G8427 DOCREV CUR MEDS BY ELIG CLIN: HCPCS | Performed by: INTERNAL MEDICINE

## 2025-06-02 PROCEDURE — 1036F TOBACCO NON-USER: CPT | Performed by: INTERNAL MEDICINE

## 2025-06-02 PROCEDURE — 3078F DIAST BP <80 MM HG: CPT | Performed by: INTERNAL MEDICINE

## 2025-06-02 PROCEDURE — 1159F MED LIST DOCD IN RCRD: CPT | Performed by: INTERNAL MEDICINE

## 2025-06-02 PROCEDURE — 99214 OFFICE O/P EST MOD 30 MIN: CPT | Performed by: INTERNAL MEDICINE

## 2025-06-02 PROCEDURE — 1111F DSCHRG MED/CURRENT MED MERGE: CPT | Performed by: INTERNAL MEDICINE

## 2025-06-02 PROCEDURE — 3074F SYST BP LT 130 MM HG: CPT | Performed by: INTERNAL MEDICINE

## 2025-06-02 NOTE — PROGRESS NOTES
CLINICAL STAFF DOCUMENTATION    Dr. Robby Chow     Tipjohn Barton  1945  [id number]    Have you had any Chest Pain recently? - No    Have you had any Shortness of Breath - No      Have you had any dizziness - No    Have you had any palpitations recently? - No    Do you have any edema - swelling in No        Is the patient on any of the following medications - no  If Yes DO EKG - Needs done every 3 months    When did you have your last labs drawn 5/2025  What doctor ordered Whitley Cespedes MD   Do we have the labs in their chart Yes      If we do not have these labs, you are retrieve these labs for the provider!    Do you need any prescriptions refilled? - No    Do you have a surgery or procedure scheduled in the near future - No    Do use tobacco products? - No  Do you drink alcohol? - very little   Do you use any illicit drugs? - No  Caffeine? - No         Check medication list thoroughly!!! AND RECONCILE OUTSIDE MEDICATIONS  If dose has changed change the entire order not just the MG  BE SURE TO ASK PATIENT IF THEY NEED MEDICATION REFILLS  Verify Pharmacy and update if incorrect    Add to every patient's \"wrap up\" the following dot phrase AFTERVISITCARDIOHEARTHOUSE and ensure we explain this to our patients    
orders were placed , instructions given by myself       Counseled extensively and medication compliance urged.  We discussed that for the  prevention of ASCVD our  goal is aggressive risk modification.Patient is encouraged to exercise if they can , educated about  brisk walk for 30 minutes  at least 3 to 4 times a week if there are no physical limitations  Various goals were discussed and questions answered. Continue current medications. Appropriate prescriptions are addressed and refills ordered.  Questions answered and patient verbalizes understanding.  Call for any problems, questions, or concerns.Greater than 60 % of time spent counseling besides reviewing data and images     Continue all other medications of all above medical condition listed as is.    No follow-ups on file.  Follow-up with Dr. Carr    The above note is prepared with the intention to serve as communication with trained medical care practitioners only. Some of the information is provided by secondary sources as well as from our patient and/or family member(s) recollection, thus inaccuracies may occur. In addition, other professionals integrate data into the electronic medical record, and the Heart Team MD and NPs are not responsible for these. Any errors will be corrected upon verification with documented reports.    Please note this report has been partially produced using speech recognition software and may contain errors related to that system including errors in grammar, punctuation, and spelling, as well as words and phrases that may be inappropriate. If there are any questions or concerns please feel free to contact the dictating provider for clarification.

## 2025-06-02 NOTE — PATIENT INSTRUCTIONS
Thank you for allowing us to care for you today!   We want to ensure we can follow your treatment plan and we strive to give you the best outcomes and experience possible.   If you ever have a life threatening emergency and call 911 - for an ambulance (EMS)  REMEMBER  Our providers can only care for you at:   [facility] or [facility]   Even if you have someone take you or you drive yourself we can only care for you in a [facility] facility. Our providers are not setup at the other healthcare locations!    PLEASE CALL OUR OFFICE DURING NORMAL BUSINESS HOURS  Monday through Friday 8 am to 5 pm  AFTER HOURS the physician on-call cannot help with scheduling, rescheduling, procedure instruction questions or any type of medication need or issue.  [facility] P:[phone] - [facility] P:[phone] - [facility] P:[phone]      If you receive a survey:  We would appreciate you taking the time to share your experience concerning your provider visit in the office.    These surveys are confidential!  We are eager to improve and are counting on you to share your feedback so we can ensure you get the best care possible.

## 2025-08-18 ENCOUNTER — TELEPHONE (OUTPATIENT)
Age: 80
End: 2025-08-18

## (undated) DEVICE — DILATOR: Brand: COOK

## (undated) DEVICE — KIT MICROINTRODUCER 4FR ECHOGENIC NDL L7CM 21GA STIFF COAX

## (undated) DEVICE — FORCEPS BX 240CM JAW 3.2MM L CAP NDL MIC MESH TTH M00513372

## (undated) DEVICE — WIRE GUID DIAG PTFE COAT FIX COR STD XIBLE J TIP 7MM

## (undated) DEVICE — LARGE ACCESS SOLUTION: Brand: VERSACROSS™ LARGE ACCESS SOLUTION

## (undated) DEVICE — PERCLOSE™ PROSTYLE™ SUTURE-MEDIATED CLOSURE AND REPAIR SYSTEM: Brand: PERCLOSE™ PROSTYLE™

## (undated) DEVICE — PINNACLE R/O II INTRODUCER SHEATH WITH RADIOPAQUE MARKER: Brand: PINNACLE

## (undated) DEVICE — SYRINGE MED 50ML LUERLOCK TIP

## (undated) DEVICE — Z DISCONTINUED NO SUB IDED TUBING ETCO2 AD L6.5FT NSL ORAL CVD PRNG NONFLARED TIP OVR

## (undated) DEVICE — Device

## (undated) DEVICE — ACCESS SHEATH WITH DILATOR: Brand: WATCHMAN FXD CURVE™ ACCESS SYSTEM

## (undated) DEVICE — GAUZE,SPONGE,4"X4",16PLY,XRAY,STRL,LF: Brand: MEDLINE

## (undated) DEVICE — CATHETER ANGIO 6FR L110CM ID0.056IN VENT PIG CRV ROBUST

## (undated) DEVICE — CHECK-FLO PERFORMER INTRODUCER: Brand: CHECK-FLO